# Patient Record
Sex: FEMALE | Race: WHITE | Employment: OTHER | URBAN - METROPOLITAN AREA
[De-identification: names, ages, dates, MRNs, and addresses within clinical notes are randomized per-mention and may not be internally consistent; named-entity substitution may affect disease eponyms.]

---

## 2017-02-07 ENCOUNTER — OFFICE VISIT (OUTPATIENT)
Dept: FAMILY MEDICINE CLINIC | Age: 72
End: 2017-02-07

## 2017-02-07 VITALS
TEMPERATURE: 98 F | HEIGHT: 65 IN | DIASTOLIC BLOOD PRESSURE: 85 MMHG | BODY MASS INDEX: 23.49 KG/M2 | WEIGHT: 141 LBS | OXYGEN SATURATION: 98 % | HEART RATE: 52 BPM | SYSTOLIC BLOOD PRESSURE: 144 MMHG | RESPIRATION RATE: 18 BRPM

## 2017-02-07 DIAGNOSIS — I10 ESSENTIAL HYPERTENSION: ICD-10-CM

## 2017-02-07 DIAGNOSIS — K21.9 GASTROESOPHAGEAL REFLUX DISEASE WITHOUT ESOPHAGITIS: ICD-10-CM

## 2017-02-07 DIAGNOSIS — Z00.00 ROUTINE GENERAL MEDICAL EXAMINATION AT A HEALTH CARE FACILITY: Primary | ICD-10-CM

## 2017-02-07 DIAGNOSIS — I25.810 CORONARY ARTERY DISEASE INVOLVING AUTOLOGOUS VEIN CORONARY BYPASS GRAFT WITHOUT ANGINA PECTORIS: ICD-10-CM

## 2017-02-07 DIAGNOSIS — Z13.39 SCREENING FOR ALCOHOLISM: ICD-10-CM

## 2017-02-07 DIAGNOSIS — F32.9 REACTIVE DEPRESSION: ICD-10-CM

## 2017-02-07 DIAGNOSIS — Z23 ENCOUNTER FOR IMMUNIZATION: ICD-10-CM

## 2017-02-07 DIAGNOSIS — Z95.1 S/P CABG X 1: ICD-10-CM

## 2017-02-07 NOTE — PATIENT INSTRUCTIONS
Medicare Part B Preventive Services Limitations Recommendation Scheduled   Bone Mass Measurement  (age 72 & older, biennial) Requires diagnosis related to osteoporosis or estrogen deficiency. Biennial benefit unless patient has history of long-term glucocorticoid tx or baseline is needed because initial test was by other method     Cardiovascular Screening Blood Tests (every 5 years)  Total cholesterol, HDL, Triglycerides Order as a panel if possible     Colorectal Cancer Screening  -Fecal occult blood test (annual)  -Flexible sigmoidoscopy (5y)  -Screening colonoscopy (10y)  -Barium Enema      Counseling to Prevent Tobacco Use (up to 8 sessions per year)  - Counseling greater than 3 and up to 10 minutes  - Counseling greater than 10 minutes Patients must be asymptomatic of tobacco-related conditions to receive as preventive service     Diabetes Screening Tests (at least every 3 years, Medicare covers annually or at 6-month intervals for prediabetic patients)    Fasting blood sugar (FBS) or glucose tolerance test (GTT) Patient must be diagnosed with one of the following:  -Hypertension, Dyslipidemia, obesity, previous impaired FBS or GTT  Or any two of the following: overweight, FH of diabetes, age ? 72, history of gestational diabetes, birth of baby weighing more than 9 pounds     Diabetes Self-Management Training (DSMT) (no USPSTF recommendation) Requires referral by treating physician for patient with diabetes or renal disease. 10 hours of initial DSMT session of no less than 30 minutes each in a continuous 12-month period. 2 hours of follow-up DSMT in subsequent years.      Glaucoma Screening (no USPSTF recommendation) Diabetes mellitus, family history, , age 48 or over,  American, age 72 or over     Human Immunodeficiency Virus (HIV) Screening (annually for increased risk patients)  HIV-1 and HIV-2 by EIA, RAYMOND, rapid antibody test, or oral mucosa transudate Patient must be at increased risk for HIV infection per USPSTF guidelines or pregnant. Tests covered annually for patients at increased risk. Pregnant patients may receive up to 3 test during pregnancy. Medical Nutrition Therapy (MNT) (for diabetes or renal disease not recommended schedule) Requires referral by treating physician for patient with diabetes or renal disease. Can be provided in same year as diabetes self-management training (DSMT), and CMS recommends medical nutrition therapy take place after DSMT. Up to 3 hours for initial year and 2 hours in subsequent years. Shingles Vaccination A shingles vaccine is also recommended once in a lifetime after age 61     Seasonal Influenza Vaccination (annually)      Pneumococcal Vaccination (once after 72)      Hepatitis B Vaccinations (if medium/high risk) Medium/high risk factors:  End-stage renal disease,  Hemophiliacs who received Factor VIII or IX concentrates, Clients of institutions for the mentally retarded, Persons who live in the same house as a HepB virus carrier, Homosexual men, Illicit injectable drug abusers. Screening Mammography (biennial age 54-69) Annually (age 36 or over)     Screening Pap Tests and Pelvic Examination (up to age 79 and after 79 if unknown history or abnormal study last 10 years) Every 25 months except high risk     Ultrasound Screening for Abdominal Aortic Aneurysm (AAA) (once) Patient must be referred through [facility] and not have had a screening for abdominal aortic aneurysm before under Medicare. Limited to patients who meet one of the following criteria:  - Men who are 73-68 years old and have smoked more than 100 cigarettes in their lifetime.  -Anyone with a FH of AAA  -Anyone recommended for screening by USPSTF         Vaccine Information Statement     Tdap (Tetanus, Diphtheria, Pertussis) Vaccine: What You Need to Know    Many Vaccine Information Statements are available in Italian and other languages.  See www.immunize.org/vis. Hojas de Información Sobre Vacunas están disponibles en español y en muchos otros idiomas. Visite [email or web address]    1. Why get vaccinated? Tetanus, diphtheria, and pertussis are very serious diseases. Tdap vaccine can protect us from these diseases. And, Tdap vaccine given to pregnant women can protect  babies against pertussis. TETANUS (Lockjaw) is rare in the [facility] today. It causes painful muscle tightening and stiffness, usually all over the body.  It can lead to tightening of muscles in the head and neck so you cant open your mouth, swallow, or sometimes even breathe. Tetanus kills about 1 out of 10 people who are infected even after receiving the best medical care. DIPHTHERIA is also rare in the [facility] today. It can cause a thick coating to form in the back of the throat.  It can lead to breathing problems, heart failure, paralysis, and death. PERTUSSIS (Whooping Cough) causes severe coughing spells, which can cause difficulty breathing, vomiting, and disturbed sleep.  It can also lead to weight loss, incontinence, and rib fractures. Up to 2 in 100 adolescents and 5 in 100 adults with pertussis are hospitalized or have complications, which could include pneumonia or death. These diseases are caused by bacteria. Diphtheria and pertussis are spread from person to person through secretions from coughing or sneezing. Tetanus enters the body through cuts, scratches, or wounds. Before vaccines, as many as 200,000 cases of diphtheria, 200,000 cases of pertussis, and hundreds of cases of tetanus, were reported in the [place] each year. Since vaccination began, reports of cases for tetanus and diphtheria have dropped by about 99% and for pertussis by about 80%. 2. Tdap vaccine    Tdap vaccine can protect adolescents and adults from tetanus, diphtheria, and pertussis. One dose of Tdap is routinely given at age 6 or 15. People who did not get Tdap at that age should get it as soon as possible. Tdap is especially important for health care professionals and anyone having close contact with a baby younger than 12 months. Pregnant women should get a dose of Tdap during every pregnancy, to protect the  from pertussis. Infants are most at risk for severe, life-threatening complications from pertussis. Another vaccine, called Td, protects against tetanus and diphtheria, but not pertussis. A Td booster should be given every 10 years. Tdap may be given as one of these boosters if you have never gotten Tdap before. Tdap may also be given after a severe cut or burn to prevent tetanus infection. Your doctor or the person giving you the vaccine can give you more information. Tdap may safely be given at the same time as other vaccines. 3. Some people should not get this vaccine     A person who has ever had a life-threatening allergic reaction after a previous dose of any diphtheria, tetanus or pertussis containing vaccine, OR has a severe allergy to any part of this vaccine, should not get Tdap vaccine. Tell the person giving the vaccine about any severe allergies.  Anyone who had coma or long repeated seizures within 7 days after a childhood dose of DTP or DTaP, or a previous dose of Tdap, should not get Tdap, unless a cause other than the vaccine was found. They can still get Td.  Talk to your doctor if you:  - have seizures or another nervous system problem,  - had severe pain or swelling after any vaccine containing diphtheria, tetanus or pertussis,   - ever had a condition called Guillain Barré Syndrome (GBS),  - arent feeling well on the day the shot is scheduled. 4. Risks    With any medicine, including vaccines, there is a chance of side effects. These are usually mild and go away on their own. Serious reactions are also possible but are rare.      Most people who get Tdap vaccine do not have any problems with it. Mild Problems following Tdap  (Did not interfere with activities)   Pain where the shot was given (about 3 in 4 adolescents or 2 in 3 adults)   Redness or swelling where the shot was given (about 1 person in 5)   Mild fever of at least 100.4°F (up to about 1 in 25 adolescents or 1 in 100 adults)   Headache (about 3 or 4 people in 10)   Tiredness (about 1 person in 3 or 4)   Nausea, vomiting, diarrhea, stomach ache (up to 1 in 4 adolescents or 1 in 10 adults)   Chills,  sore joints (about 1 person in 10)   Body aches (about 1 person in 3 or 4)    Rash, swollen glands (uncommon)    Moderate Problems following Tdap  (Interfered with activities, but did not require medical attention)   Pain where the shot was given (up to 1 in 5 or 6)    Redness or swelling where the shot was given (up to about 1 in 16 adolescents or 1 in 12 adults)   Fever over 102°F (about 1 in 100 adolescents or 1 in 250 adults)   Headache (about 1 in 7 adolescents or 1 in 10 adults)   Nausea, vomiting, diarrhea, stomach ache (up to 1 or 3 people in 100)   Swelling of the entire arm where the shot was given (up to about 1 in 500). Severe Problems following Tdap  (Unable to perform usual activities; required medical attention)   Swelling, severe pain, bleeding, and redness in the arm where the shot was given (rare). Problems that could happen after any vaccine:     People sometimes faint after a medical procedure, including vaccination. Sitting or lying down for about 15 minutes can help prevent fainting, and injuries caused by a fall. Tell your doctor if you feel dizzy, or have vision changes or ringing in the ears.  Some people get severe pain in the shoulder and have difficulty moving the arm where a shot was given. This happens very rarely.  Any medication can cause a severe allergic reaction.  Such reactions from a vaccine are very rare, estimated at fewer than 1 in a million doses, and would happen within a few minutes to a few hours after the vaccination. As with any medicine, there is a very remote chance of a vaccine causing a serious injury or death. The safety of vaccines is always being monitored. For more information, visit: www.cdc.gov/vaccinesafety/    5. What if there is a serious problem? What should I look for?  Look for anything that concerns you, such as signs of a severe allergic reaction, very high fever, or unusual behavior.  Signs of a severe allergic reaction can include hives, swelling of the face and throat, difficulty breathing, a fast heartbeat, dizziness, and weakness. These would usually start a few minutes to a few hours after the vaccination. What should I do?  If you think it is a severe allergic reaction or other emergency that cant wait, call 9-1-1 or get the person to the nearest hospital. Otherwise, call your doctor.  Afterward, the reaction should be reported to the Vaccine Adverse Event Reporting System (VAERS). Your doctor might file this report, or you can do it yourself through the VAERS web site at www.vaers. [facility].gov, or by calling 8-168.284.9970. VAERS does not give medical advice. 6. The National Vaccine Injury Compensation Program    The [facility] Vaccine Injury Compensation Program (VICP) is a federal program that was created to compensate people who may have been injured by certain vaccines. Persons who believe they may have been injured by a vaccine can learn about the program and about filing a claim by calling 4-829.425.2948 or visiting the [employer] [address] [employer] website at www.[facility].gov/vaccinecompensation. There is a time limit to file a claim for compensation. 7. How can I learn more?  Ask your doctor. He or she can give you the vaccine package insert or suggest other sources of information.  Call your local or state health department.    Contact the Centers for Disease Control and Prevention (CDC):  - Call 2-939.314.9071 (8-051-KOR-INFO) or  - Visit CDCs website at www.cdc.gov/vaccines      Vaccine Information Statement   Tdap Vaccine  (2/24/2015)  42 ISAIAS Montoya [id number]    Department of Health and Human Services  Centers for Disease Control and Prevention    Office Use Only

## 2017-02-07 NOTE — MR AVS SNAPSHOT
Visit Information Date & Time Provider Department Dept. Phone Encounter #  
 2/7/2017 10:00 AM Ramesh Nichols MD [address] [facility] [phone] [id number] Follow-up Instructions Return in about 1 year (around 2/7/2018). Upcoming Health Maintenance Date Due Hepatitis C Screening 1945 DTaP/Tdap/Td series (1 - Tdap) 12/11/1968 ZOSTER VACCINE AGE 60> 12/11/2007 OSTEOPOROSIS SCREENING (DEXA) 12/11/2012 GLAUCOMA SCREENING Q2Y 11/21/2015 COLONOSCOPY 5/21/2016 MEDICARE YEARLY EXAM 10/6/2016 BREAST CANCER SCRN MAMMOGRAM 6/16/2017 Allergies as of 2/7/2017  Review Complete On: 2/7/2017 By: Jeanette Wayne Severity Noted Reaction Type Reactions Amoxicillin  12/06/2010    Rash Amoxicillin  11/07/2011   Side Effect Rash  
 Sulfa (Sulfonamide Antibiotics)  01/14/2013   Intolerance Nausea Only Current Immunizations  Reviewed on 10/4/2016 Name Date Influenza High Dose Vaccine PF 10/4/2016, 10/6/2015, 11/11/2014 Influenza Vaccine 10/23/2013 Influenza Vaccine Split 11/16/2012 Influenza Vaccine Whole 10/1/2010 Pneumococcal Polysaccharide (PPSV-23) 5/21/2014 Pneumococcal Vaccine (Unspecified Type) 1/1/2007 Varicella Virus Vaccine Live 6/19/2012 Not reviewed this visit You Were Diagnosed With   
  
 Codes Comments Routine general medical examination at a health care facility    -  Primary ICD-10-CM: Z00.00 ICD-9-CM: V70.0 Screening for alcoholism     ICD-10-CM: Z13.89 ICD-9-CM: V79.1 Encounter for immunization     ICD-10-CM: M42 ICD-9-CM: V03.89 S/P CABG x 1     ICD-10-CM: Z95.1 ICD-9-CM: V45.81 Reactive depression     ICD-10-CM: F32.9 ICD-9-CM: 300.4 Vitals Weight(growth percentile) BMI OB Status Smoking Status 141 lb (64 kg) 23.46 kg/m2 Postmenopausal Never Smoker BMI and BSA Data  Body Mass Index Body Surface Area  
 23.46 kg/m 2 1.71 m 2  
  
  
 Preferred Pharmacy Pharmacy Name Phone [facility] [address] [phone] Your Updated Medication List  
  
   
This list is accurate as of: 17 11:13 AM.  Always use your most recent med list.  
  
  
  
  
 SINDY CHILDRENS ASPIRIN 81 mg chewable tablet Generic drug:  aspirin Take 81 mg by mouth daily. CRANBERRY CONCENTRATE Cap Generic drug:  vitamin c-vitamin e Take 1 Tab by mouth daily. diph,pertuss(acel),tetanus vac(PF) 2 Lf-(2.5-5-3-5 mcg)-5Lf/0.5 mL Syrg vaccine Commonly known as:  ADACEL  
0.5 mL by IntraMUSCular route once for 1 dose. FLUoxetine 10 mg capsule Commonly known as:  PROzac Take 1 Cap by mouth daily. HYDROcodone-acetaminophen 5-325 mg per tablet Commonly known as:  Rolinda Doles Take 1 Tab by mouth every six (6) hours as needed for Pain. Max Daily Amount: 4 Tabs. lisinopril 10 mg tablet Commonly known as:  PRINIVIL, ZESTRIL  
TAKE 1 TABLET BY MOUTH EVERY NIGHT  
  
 metoprolol succinate 50 mg XL tablet Commonly known as:  TOPROL-XL  
TAKE 1 TABLET BY MOUTH EVERY DAY  
  
 omeprazole 20 mg capsule Commonly known as:  PRILOSEC  
TAKE 1 CAPSULE BY MOUTH EVERY DAY PARoxetine 10 mg tablet Commonly known as:  PAXIL Take 1 Tab by mouth daily. pravastatin 80 mg tablet Commonly known as:  PRAVACHOL Take 1 Tab by mouth nightly. traMADol 50 mg tablet Commonly known as:  ULTRAM  
TAKE 1 TABLET BY MOUTH EVERY 6 HOURS AS NEEDED FOR PAIN  
  
 VITAMIN D3 1,000 unit tablet Generic drug:  cholecalciferol Take 1,000 Units by mouth daily. Prescriptions Printed Refills diph,pertuss,acel,,tetanus vac,PF, (ADACEL) 2 Lf-(2.5-5-3-5 mcg)-5Lf/0.5 mL syrg vaccine 0 Si.5 mL by IntraMUSCular route once for 1 dose. Class: Print Route: IntraMUSCular We Performed the Following REFERRAL TO PSYCHOLOGY [NEY10 Custom] Comments: Please evaluate patient for reactive depression. Follow-up Instructions Return in about 1 year (around 2/7/2018). Referral Information Referral ID Referred By Referred To  
  
 [id number] Celestine RENNER Not Available Visits Status Start Date End Date 1 New Request 2/7/17 2/7/18 If your referral has a status of pending review or denied, additional information will be sent to support the outcome of this decision. Patient Instructions Medicare Part B Preventive Services Limitations Recommendation Scheduled Bone Mass Measurement 
(age 72 & older, biennial) Requires diagnosis related to osteoporosis or estrogen deficiency. Biennial benefit unless patient has history of long-term glucocorticoid tx or baseline is needed because initial test was by other method Cardiovascular Screening Blood Tests (every 5 years) Total cholesterol, HDL, Triglycerides Order as a panel if possible Colorectal Cancer Screening 
-Fecal occult blood test (annual) -Flexible sigmoidoscopy (5y) 
-Screening colonoscopy (10y) -Barium Enema Counseling to Prevent Tobacco Use (up to 8 sessions per year) - Counseling greater than 3 and up to 10 minutes - Counseling greater than 10 minutes Patients must be asymptomatic of tobacco-related conditions to receive as preventive service Diabetes Screening Tests (at least every 3 years, Medicare covers annually or at 6-month intervals for prediabetic patients) Fasting blood sugar (FBS) or glucose tolerance test (GTT) Patient must be diagnosed with one of the following: 
-Hypertension, Dyslipidemia, obesity, previous impaired FBS or GTT 
Or any two of the following: overweight, FH of diabetes, age ? 72, history of gestational diabetes, birth of baby weighing more than 9 pounds Diabetes Self-Management Training (DSMT) (no USPSTF recommendation) Requires referral by treating physician for patient with diabetes or renal disease. 10 hours of initial DSMT session of no less than 30 minutes each in a continuous 12-month period. 2 hours of follow-up DSMT in subsequent years. Glaucoma Screening (no USPSTF recommendation) Diabetes mellitus, family history, , age 48 or over,  American, age 72 or over Human Immunodeficiency Virus (HIV) Screening (annually for increased risk patients) HIV-1 and HIV-2 by EIA, RAYMOND, rapid antibody test, or oral mucosa transudate Patient must be at increased risk for HIV infection per USPSTF guidelines or pregnant. Tests covered annually for patients at increased risk. Pregnant patients may receive up to 3 test during pregnancy. Medical Nutrition Therapy (MNT) (for diabetes or renal disease not recommended schedule) Requires referral by treating physician for patient with diabetes or renal disease. Can be provided in same year as diabetes self-management training (DSMT), and CMS recommends medical nutrition therapy take place after DSMT. Up to 3 hours for initial year and 2 hours in subsequent years. Shingles Vaccination A shingles vaccine is also recommended once in a lifetime after age 61 Seasonal Influenza Vaccination (annually) Pneumococcal Vaccination (once after 65) Hepatitis B Vaccinations (if medium/high risk) Medium/high risk factors:  End-stage renal disease, Hemophiliacs who received Factor VIII or IX concentrates, Clients of institutions for the mentally retarded, Persons who live in the same house as a HepB virus carrier, Homosexual men, Illicit injectable drug abusers. Screening Mammography (biennial age 54-69) Annually (age 36 or over) Screening Pap Tests and Pelvic Examination (up to age 79 and after 79 if unknown history or abnormal study last 10 years) Every 24 months except high risk Ultrasound Screening for Abdominal Aortic Aneurysm (AAA) (once) Patient must be referred through [facility] and not have had a screening for abdominal aortic aneurysm before under Medicare. Limited to patients who meet one of the following criteria: 
- Men who are 73-68 years old and have smoked more than 100 cigarettes in their lifetime. 
-Anyone with a FH of AAA 
-Anyone recommended for screening by USPSTF Vaccine Information Statement Tdap (Tetanus, Diphtheria, Pertussis) Vaccine: What You Need to Know Many Vaccine Information Statements are available in [place] and other languages. See www.immunize.org/vis. Hojas de Información Sobre Vacunas están disponibles en español y en muchos otros idiomas. Visite [facility] 1. Why get vaccinated? Tetanus, diphtheria, and pertussis are very serious diseases. Tdap vaccine can protect us from these diseases. And, Tdap vaccine given to pregnant women can protect  babies against pertussis. TETANUS (Lockjaw) is rare in the [facility] today. It causes painful muscle tightening and stiffness, usually all over the body. ? It can lead to tightening of muscles in the head and neck so you cant open your mouth, swallow, or sometimes even breathe. Tetanus kills about 1 out of 10 people who are infected even after receiving the best medical care. DIPHTHERIA is also rare in the [facility] today. It can cause a thick coating to form in the back of the throat. ? It can lead to breathing problems, heart failure, paralysis, and death. PERTUSSIS (Whooping Cough) causes severe coughing spells, which can cause difficulty breathing, vomiting, and disturbed sleep. ? It can also lead to weight loss, incontinence, and rib fractures. Up to 2 in 100 adolescents and 5 in 100 adults with pertussis are hospitalized or have complications, which could include pneumonia or death. These diseases are caused by bacteria.  Diphtheria and pertussis are spread from person to person through secretions from coughing or sneezing. Tetanus enters the body through cuts, scratches, or wounds. Before vaccines, as many as 200,000 cases of diphtheria, 200,000 cases of pertussis, and hundreds of cases of tetanus, were reported in the [place] each year. Since vaccination began, reports of cases for tetanus and diphtheria have dropped by about 99% and for pertussis by about 80%. 2. Tdap vaccine Tdap vaccine can protect adolescents and adults from tetanus, diphtheria, and pertussis. One dose of Tdap is routinely given at age 6 or 15. People who did not get Tdap at that age should get it as soon as possible. Tdap is especially important for health care professionals and anyone having close contact with a baby younger than 12 months. Pregnant women should get a dose of Tdap during every pregnancy, to protect the  from pertussis. Infants are most at risk for severe, life-threatening complications from pertussis. Another vaccine, called Td, protects against tetanus and diphtheria, but not pertussis. A Td booster should be given every 10 years. Tdap may be given as one of these boosters if you have never gotten Tdap before. Tdap may also be given after a severe cut or burn to prevent tetanus infection. Your doctor or the person giving you the vaccine can give you more information. Tdap may safely be given at the same time as other vaccines. 3. Some people should not get this vaccine  A person who has ever had a life-threatening allergic reaction after a previous dose of any diphtheria, tetanus or pertussis containing vaccine, OR has a severe allergy to any part of this vaccine, should not get Tdap vaccine. Tell the person giving the vaccine about any severe allergies.  
 
 Anyone who had coma or long repeated seizures within 7 days after a childhood dose of DTP or DTaP, or a previous dose of Tdap, should not get Tdap, unless a cause other than the vaccine was found. They can still get Td.  Talk to your doctor if you: 
- have seizures or another nervous system problem, 
- had severe pain or swelling after any vaccine containing diphtheria, tetanus or pertussis,  
- ever had a condition called Guillain Barré Syndrome (GBS), 
- arent feeling well on the day the shot is scheduled. 4. Risks With any medicine, including vaccines, there is a chance of side effects. These are usually mild and go away on their own. Serious reactions are also possible but are rare. Most people who get Tdap vaccine do not have any problems with it. Mild Problems following Tdap 
(Did not interfere with activities)  Pain where the shot was given (about 3 in 4 adolescents or 2 in 3 adults)  Redness or swelling where the shot was given (about 1 person in 5)  Mild fever of at least 100.4°F (up to about 1 in 25 adolescents or 1 in 100 adults)  Headache (about 3 or 4 people in 10)  Tiredness (about 1 person in 3 or 4)  Nausea, vomiting, diarrhea, stomach ache (up to 1 in 4 adolescents or 1 in 10 adults)  Chills,  sore joints (about 1 person in 10)  Body aches (about 1 person in 3 or 4)  Rash, swollen glands (uncommon) Moderate Problems following Tdap (Interfered with activities, but did not require medical attention)  Pain where the shot was given (up to 1 in 5 or 6)  Redness or swelling where the shot was given (up to about 1 in 16 adolescents or 1 in 12 adults)  Fever over 102°F (about 1 in 100 adolescents or 1 in 250 adults)  Headache (about 1 in 7 adolescents or 1 in 10 adults)  Nausea, vomiting, diarrhea, stomach ache (up to 1 or 3 people in 100)  Swelling of the entire arm where the shot was given (up to about 1 in 500). Severe Problems following Tdap 
(Unable to perform usual activities; required medical attention)  Swelling, severe pain, bleeding, and redness in the arm where the shot was given (rare). Problems that could happen after any vaccine:  People sometimes faint after a medical procedure, including vaccination. Sitting or lying down for about 15 minutes can help prevent fainting, and injuries caused by a fall. Tell your doctor if you feel dizzy, or have vision changes or ringing in the ears.  Some people get severe pain in the shoulder and have difficulty moving the arm where a shot was given. This happens very rarely.  Any medication can cause a severe allergic reaction. Such reactions from a vaccine are very rare, estimated at fewer than 1 in a million doses, and would happen within a few minutes to a few hours after the vaccination. As with any medicine, there is a very remote chance of a vaccine causing a serious injury or death. The safety of vaccines is always being monitored. For more information, visit: www.cdc.gov/vaccinesafety/ 
 
5. What if there is a serious problem? What should I look for?  Look for anything that concerns you, such as signs of a severe allergic reaction, very high fever, or unusual behavior.  Signs of a severe allergic reaction can include hives, swelling of the face and throat, difficulty breathing, a fast heartbeat, dizziness, and weakness. These would usually start a few minutes to a few hours after the vaccination. What should I do?  If you think it is a severe allergic reaction or other emergency that cant wait, call 9-1-1 or get the person to the nearest hospital. Otherwise, call your doctor.  Afterward, the reaction should be reported to the Vaccine Adverse Event Reporting System (VAERS). Your doctor might file this report, or you can do it yourself through the VAERS web site at www.vaers. hhs.gov, or by calling 4-611.300.7762. VAERS does not give medical advice.  
 
6. The National Vaccine Injury Compensation Program 
 
The [facility] Vaccine Injury Compensation Program (VICP) is a federal program that was created to compensate people who may have been injured by certain vaccines. Persons who believe they may have been injured by a vaccine can learn about the program and about filing a claim by calling 1-807.704.3626 or visiting the [place] website at [email or web address]/vaccinecompensation. There is a time limit to file a claim for compensation. 7. How can I learn more?  Ask your doctor. He or she can give you the vaccine package insert or suggest other sources of information.  Call your local or state health department.  Contact the Centers for Disease Control and Prevention (CDC): 
- Call 9-195.956.1650 (9-412-MEL-INFO) or 
- Visit CDCs website at www.cdc.gov/vaccines Vaccine Information Statement Tdap Vaccine 
(2/24/2015) 42 CHARITYMaryjane Dutta 733AR-09 [facility] and [employer] Centers for Disease Control and Prevention Office Use Only Introducing [facility]! [employer] introduces [employer] patient portal. Now you can access parts of your medical record, email your doctor's office, and request medication refills online. 1. In your internet browser, go to [email or web address] 2. Click on the First Time User? Click Here link in the Sign In box. You will see the New Member Sign Up page. 3. Enter your [employer] Access Code exactly as it appears below. You will not need to use this code after youve completed the sign-up process. If you do not sign up before the expiration date, you must request a new code. · [employer] Access Code: [email or web address] Expires: 5/8/2017  9:51 AM 
 
4. Enter the last four digits of your Social Security Number (xxxx) and Date of Birth (mm/dd/yyyy) as indicated and click Submit. You will be taken to the next sign-up page. 5. Create a [employer]SI[employer] ID. This will be your [employer] login ID and cannot be changed, so think of one that is secure and easy to remember. 6. Create a [employer] password. You can change your password at any time. 7. Enter your Password Reset Question and Answer. This can be used at a later time if you forget your password. 8. Enter your e-mail address. You will receive e-mail notification when new information is available in [address] 9. Click Sign Up. You can now view and download portions of your medical record. 10. Click the Download Summary menu link to download a portable copy of your medical information. If you have questions, please visit the Frequently Asked Questions section of the [employer] website. Remember, [employer] is NOT to be used for urgent needs. For medical emergencies, dial 911. Now available from your iPhone and Android! Please provide this summary of care documentation to your next provider. Your primary care clinician is listed as Remberto Fisher. If you have any questions after today's visit, please call [phone].

## 2017-02-07 NOTE — PROGRESS NOTES
NICOLE White is a 73 y.o. female who presents to discuss her chronic medical conditions and her mood. She has felt rushed off her feet for the last several months. She is taking care of several family members and this burn seems to be wearing on her. Every day she feels stressed. Has good days and bad days. 50-50 which they will be a good day. When she is having bad days she feels the urge to get out of the house and go shopping. He is sitting with a sister-in-law who has breast cancer and is very depressed. Patient believes those interactions feeling very depressed. She has another sister who lives next door who has anxiety. Patient feels very anxious after interacting with the sister. She lost her  3 years ago and does not think that she is quite over it. Gets lonely in the house. Try taking Paxil 10 mg and Prozac 10 mg at one point. Only took them for about 2 weeks each and did not notice much benefit. She is interested in reducing her total medicines    PMHx:  Past Medical History   Diagnosis Date    Back pain     CAD (coronary artery disease)      CABG, [place]    Hypercholesterolemia     Hypertension        Meds:   Current Outpatient Prescriptions   Medication Sig Dispense Refill    diph,pertuss,acel,,tetanus vac,PF, (ADACEL) 2 Lf-(2.5-5-3-5 mcg)-5Lf/0.5 mL syrg vaccine 0.5 mL by IntraMUSCular route once for 1 dose. 0.5 mL 0    pravastatin (PRAVACHOL) 80 mg tablet Take 1 Tab by mouth nightly. 90 Tab 3    omeprazole (PRILOSEC) 20 mg capsule TAKE 1 CAPSULE BY MOUTH EVERY DAY 90 Cap 3    metoprolol succinate (TOPROL-XL) 50 mg XL tablet TAKE 1 TABLET BY MOUTH EVERY DAY 90 Tab 2    lisinopril (PRINIVIL, ZESTRIL) 10 mg tablet TAKE 1 TABLET BY MOUTH EVERY NIGHT 90 Tab 3    vitamin c-vitamin e (CRANBERRY CONCENTRATE) cap Take 1 Tab by mouth daily.  aspirin (SINDY CHILDRENS ASPIRIN) 81 mg chewable tablet Take 81 mg by mouth daily.       cholecalciferol, vitamin d3, (VITAMIN D) 1,000 unit tablet Take 1,000 Units by mouth daily.  PARoxetine (PAXIL) 10 mg tablet Take 1 Tab by mouth daily. 30 Tab 3    FLUoxetine (PROZAC) 10 mg capsule Take 1 Cap by mouth daily. 30 Cap 2    traMADol (ULTRAM) 50 mg tablet TAKE 1 TABLET BY MOUTH EVERY 6 HOURS AS NEEDED FOR PAIN 30 Tab 0    HYDROcodone-acetaminophen (NORCO) 5-325 mg per tablet Take 1 Tab by mouth every six (6) hours as needed for Pain. Max Daily Amount: 4 Tabs. 30 Tab 0       Allergies: Allergies   Allergen Reactions    Amoxicillin Rash    Amoxicillin Rash    Sulfa (Sulfonamide Antibiotics) Nausea Only       Smoker:  History   Smoking Status    Never Smoker   Smokeless Tobacco    Never Used       ETOH:   History   Alcohol Use    Yes     Comment: occ       FH:   Family History   Problem Relation Age of Onset    Heart Attack Mother     Heart Disease Mother     Heart Disease Father     Heart Disease Sister     Emphysema Sister        ROS:  As listed in HPI. In addition:  Constitutional:   No headache, fever, fatigue, weight loss or weight gain    Cardiac:    No chest pain      Resp:   No cough or shortness of breath      Neuro   No loss of consciousness, dizziness, seizures      Physical Exam:  Blood pressure 144/85, pulse (!) 52, temperature 98 °F (36.7 °C), resp. rate 18, height 5' 5\" (1.651 m), weight 141 lb (64 kg), SpO2 98 %. GEN: No apparent distress. Alert and oriented and responds to all questions appropriately. LUNGS: Respirations unlabored; clear to auscultation bilaterally  CARDIOVASCULAR: Regular rate and rhythm without murmurs   ABDOMEN: Soft; nontender; nondistended; normoactive bowel sounds; no masses or organomegaly  NEUROLOGIC:  No focal neurologic deficits. Strength and sensation grossly intact. Coordination and gait grossly intact. EXT: Well perfused. No edema. SKIN: No obvious rashes.        Assessment and Plan     See the attached Medicare wellness visit    Reactive depression  Using this diagnosis for lack of a better term. She has been going through a rough time since her 's death 3 years ago. A lot of this seems to be a result of caring for family members. She puts a lot of effort into caring for her ailing family. The emotions she described to me are counter transference from their mental health issues. Exhibited remarkable insight into this. I am not getting the impression that she is depressed. She is capable of having good days. This sounds like life stress. I recommend that she engage a therapist to help her identify this better and build some defense mechanisms. Explained this to patient. Seemed understand. She seemed reassured that she did not need medicine at this point. We also reviewed medications that she is taking, what the role is and whether or not she could stop them:    GERD  Taking omeprazole daily. No proven structural defect. Would like her to stop this using the Zantac bridge that I wrote out for her. CABG  Taking pravastatin 80 mg. This is the right dose given her heart history  Metoprolol  Aspirin a day    Hypertension  Lisinopril 10 mg      ICD-10-CM ICD-9-CM    1. Routine general medical examination at a health care facility Z00.00 V70.0    2. Screening for alcoholism Z13.89 V79.1    3. Encounter for immunization Z23 V03.89 diph,pertuss,acel,,tetanus vac,PF, (ADACEL) 2 Lf-(2.5-5-3-5 mcg)-5Lf/0.5 mL syrg vaccine   4. S/P CABG x 1 Z95.1 V45.81    5. Reactive depression F32.9 300.4 REFERRAL TO PSYCHOLOGY   6. Gastroesophageal reflux disease without esophagitis K21.9 530.81    7. Essential hypertension I10 401.9    8. Coronary artery disease involving autologous vein coronary bypass graft without angina pectoris I25.810 414.02        AVS given.  Pt expressed understanding of instructions

## 2017-02-07 NOTE — PROGRESS NOTES
.  Chief Complaint   Patient presents with   [facility] Annual Wellness Visit    Anxiety     . Leane Lesch

## 2017-03-10 RX ORDER — METOPROLOL SUCCINATE 50 MG/1
TABLET, EXTENDED RELEASE ORAL
Qty: 90 TAB | Refills: 3 | Status: SHIPPED | OUTPATIENT
Start: 2017-03-10 | End: 2018-03-08 | Stop reason: SDUPTHER

## 2017-04-11 ENCOUNTER — TELEPHONE (OUTPATIENT)
Dept: FAMILY MEDICINE CLINIC | Age: 72
End: 2017-04-11

## 2017-04-11 NOTE — TELEPHONE ENCOUNTER
Spoke with patient and at this time she can afford her script which would run her approximately 57.29 for 90 day supply.

## 2017-04-26 RX ORDER — LISINOPRIL 10 MG/1
10 TABLET ORAL DAILY
Qty: 90 TAB | Refills: 3 | Status: SHIPPED | OUTPATIENT
Start: 2017-04-26 | End: 2018-04-25 | Stop reason: SDUPTHER

## 2017-06-30 ENCOUNTER — OFFICE VISIT (OUTPATIENT)
Dept: FAMILY MEDICINE CLINIC | Age: 72
End: 2017-06-30

## 2017-06-30 VITALS
RESPIRATION RATE: 12 BRPM | WEIGHT: 133 LBS | OXYGEN SATURATION: 16 % | HEART RATE: 58 BPM | DIASTOLIC BLOOD PRESSURE: 71 MMHG | HEIGHT: 65 IN | SYSTOLIC BLOOD PRESSURE: 125 MMHG | TEMPERATURE: 98 F | BODY MASS INDEX: 22.16 KG/M2

## 2017-06-30 DIAGNOSIS — M79.18 MYOFASCIAL PAIN: Primary | ICD-10-CM

## 2017-06-30 NOTE — PROGRESS NOTES
Taco Gonzalez is a 73 y.o. female who has had pain on the right side of her neck for the last week. This resolved and she woke up this morning with pain in her left shoulder and a little pain going down her left arm. She first noticed the pain while she was lying in bed before she got up. Once she got up and got moving the pain subsided. Evidently she was worried that this could be her heart. She has been taking on a lot more stress than usual recently. See my last note, she is caring for a number of her family members and is taking a lot of stress on her own shoulders. Has not established with a therapist yet. PMHx:  Past Medical History:   Diagnosis Date    Back pain     CAD (coronary artery disease)     CABG, [place]    Hypercholesterolemia     Hypertension        Meds:   Current Outpatient Prescriptions   Medication Sig Dispense Refill    B.infantis-B.ani-B.long-B.bifi (PROBIOTIC 4X) 10-15 mg TbEC Take  by mouth.  lisinopril (PRINIVIL, ZESTRIL) 10 mg tablet Take 1 Tab by mouth daily. 90 Tab 3    metoprolol succinate (TOPROL-XL) 50 mg XL tablet TAKE 1 TABLET BY MOUTH EVERY DAY 90 Tab 3    pravastatin (PRAVACHOL) 80 mg tablet Take 1 Tab by mouth nightly. 90 Tab 3    vitamin c-vitamin e (CRANBERRY CONCENTRATE) cap Take 1 Tab by mouth daily.  aspirin (SINDY CHILDRENS ASPIRIN) 81 mg chewable tablet Take 81 mg by mouth daily.  cholecalciferol, vitamin d3, (VITAMIN D) 1,000 unit tablet Take 1,000 Units by mouth daily.  omeprazole (PRILOSEC) 20 mg capsule TAKE 1 CAPSULE BY MOUTH EVERY DAY 90 Cap 3       Allergies:    Allergies   Allergen Reactions    Amoxicillin Rash    Amoxicillin Rash    Sulfa (Sulfonamide Antibiotics) Nausea Only       Smoker:  History   Smoking Status    Never Smoker   Smokeless Tobacco    Never Used       ETOH:   History   Alcohol Use    Yes     Comment: occ       FH:   Family History   Problem Relation Age of Onset    Heart Attack Mother     Heart Disease Mother     Heart Disease Father     Heart Disease Sister     Emphysema Sister        ROS:   As listed in HPI. In addition:  Constitutional:   No headache, fever, fatigue, weight loss or weight gain      Cardiac:    No chest pain      Resp:   No cough or shortness of breath      Neuro   No loss of consciousness, dizziness, seizures      Physical Exam:  Blood pressure 125/71, pulse (!) 58, temperature 98 °F (36.7 °C), resp. rate 12, height 5' 5\" (1.651 m), weight 133 lb (60.3 kg), SpO2 (!) 16 %. GEN: No apparent distress. Alert and oriented and responds to all questions appropriately. NEUROLOGIC:  No focal neurologic deficits. Strength and sensation grossly intact. Coordination and gait grossly intact. EXT: Well perfused. No edema. SKIN: No obvious rashes. Right scalene muscle is tight but not exquisitely tender to palpation. There are superior lateral trapezius trigger points bilaterally. Medial trapezius is also very tight. There are paraspinal muscle spasms in the lumbar area that are exquisitely tender to palpation       Assessment and Plan     Myofascial pain,  Superior lateral trapezius trigger point is causing the arm symptoms on the left, right-sided scalene muscle is tender and was causing her neck pain for the last week. Is also experiencing a little bit of low back pain with the erector spinae a muscle spasm. Went over stretches and exercises that she can do. Try NSAIDs and heating pad for this. If still present in a week return for trigger point injection. She was concerned about her heart. Not unreasonable given the left arm pain but reassuring that the pain got better with activity. No other signs. Try to establish [place] a therapist.  She [place] a lead on somebody [place] [address]    1. Myofascial pain M79.1 729.1        AVS given.  Pt expressed understanding of instructions

## 2017-08-08 ENCOUNTER — HOSPITAL ENCOUNTER (OUTPATIENT)
Dept: LAB | Age: 72
Discharge: HOME | End: 2017-08-08
Payer: MEDICARE

## 2017-08-08 ENCOUNTER — OFFICE VISIT (OUTPATIENT)
Dept: FAMILY MEDICINE CLINIC | Age: 72
End: 2017-08-08

## 2017-08-08 VITALS
TEMPERATURE: 98 F | HEART RATE: 60 BPM | WEIGHT: 134 LBS | SYSTOLIC BLOOD PRESSURE: 131 MMHG | BODY MASS INDEX: 22.33 KG/M2 | DIASTOLIC BLOOD PRESSURE: 67 MMHG | OXYGEN SATURATION: 97 % | HEIGHT: 65 IN | RESPIRATION RATE: 16 BRPM

## 2017-08-08 DIAGNOSIS — Z11.59 ENCOUNTER FOR HEPATITIS C SCREENING TEST FOR LOW RISK PATIENT: ICD-10-CM

## 2017-08-08 DIAGNOSIS — I10 ESSENTIAL HYPERTENSION: Primary | ICD-10-CM

## 2017-08-08 DIAGNOSIS — R35.0 URINE FREQUENCY: ICD-10-CM

## 2017-08-08 DIAGNOSIS — I25.810 CORONARY ARTERY DISEASE INVOLVING AUTOLOGOUS VEIN CORONARY BYPASS GRAFT WITHOUT ANGINA PECTORIS: ICD-10-CM

## 2017-08-08 DIAGNOSIS — E78.5 HYPERLIPIDEMIA, UNSPECIFIED HYPERLIPIDEMIA TYPE: ICD-10-CM

## 2017-08-08 DIAGNOSIS — R53.83 FATIGUE, UNSPECIFIED TYPE: ICD-10-CM

## 2017-08-08 LAB
BILIRUB UR QL STRIP: NEGATIVE
GLUCOSE UR-MCNC: NEGATIVE MG/DL
KETONES P FAST UR STRIP-MCNC: NEGATIVE MG/DL
PH UR STRIP: 6.5 [PH] (ref 4.6–8)
PROT UR QL STRIP: NEGATIVE MG/DL
SP GR UR STRIP: 1.01 (ref 1–1.03)
UA UROBILINOGEN AMB POC: NORMAL (ref 0.2–1)
URINALYSIS CLARITY POC: CLEAR
URINALYSIS COLOR POC: YELLOW
URINE BLOOD POC: NEGATIVE
URINE LEUKOCYTES POC: NORMAL
URINE NITRITES POC: NEGATIVE

## 2017-08-08 PROCEDURE — 36415 COLL VENOUS BLD VENIPUNCTURE: CPT

## 2017-08-08 PROCEDURE — 83036 HEMOGLOBIN GLYCOSYLATED A1C: CPT

## 2017-08-08 PROCEDURE — 87086 URINE CULTURE/COLONY COUNT: CPT

## 2017-08-08 PROCEDURE — 84443 ASSAY THYROID STIM HORMONE: CPT

## 2017-08-08 PROCEDURE — 86803 HEPATITIS C AB TEST: CPT

## 2017-08-08 PROCEDURE — 80053 COMPREHEN METABOLIC PANEL: CPT

## 2017-08-08 PROCEDURE — 85025 COMPLETE CBC W/AUTO DIFF WBC: CPT

## 2017-08-08 RX ORDER — CIPROFLOXACIN 500 MG/1
500 TABLET ORAL 2 TIMES DAILY
Qty: 10 TAB | Refills: 0 | Status: SHIPPED | OUTPATIENT
Start: 2017-08-08 | End: 2017-08-13

## 2017-08-08 NOTE — MR AVS SNAPSHOT
Visit Information Date & Time Provider Department Dept. Phone Encounter #  
 8/8/2017  2:15 PM [phone] [id number] Upcoming Health Maintenance Date Due Hepatitis C Screening 1945 DTaP/Tdap/Td series (1 - Tdap) 12/11/1968 ZOSTER VACCINE AGE 60> 10/11/2007 OSTEOPOROSIS SCREENING (DEXA) 12/11/2012 GLAUCOMA SCREENING Q2Y 11/21/2015 COLONOSCOPY 5/21/2016 BREAST CANCER SCRN MAMMOGRAM 6/16/2017 INFLUENZA AGE 9 TO ADULT 8/1/2017 MEDICARE YEARLY EXAM 2/8/2018 Allergies as of 8/8/2017  Review Complete On: 8/8/2017 By: Roni Hitchcock MD  
  
 Severity Noted Reaction Type Reactions Amoxicillin  12/06/2010    Rash Amoxicillin  11/07/2011   Side Effect Rash  
 Sulfa (Sulfonamide Antibiotics)  01/14/2013   Intolerance Nausea Only Current Immunizations  Reviewed on 10/4/2016 Name Date Influenza High Dose Vaccine PF 10/4/2016, 10/6/2015, 11/11/2014 Influenza Vaccine 10/23/2013 Influenza Vaccine Split 11/16/2012 Influenza Vaccine Whole 10/1/2010 Pneumococcal Polysaccharide (PPSV-23) 5/21/2014 Varicella Virus Vaccine Live 6/19/2012 ZZZ-RETIRED (DO NOT USE) Pneumococcal Vaccine (Unspecified Type) 1/1/2007 Not reviewed this visit You Were Diagnosed With   
  
 Codes Comments Essential hypertension    -  Primary ICD-10-CM: I10 
ICD-9-CM: 401.9 Coronary artery disease involving autologous vein coronary bypass graft without angina pectoris     ICD-10-CM: I25.810 ICD-9-CM: 414.02 Hyperlipidemia, unspecified hyperlipidemia type     ICD-10-CM: E78.5 ICD-9-CM: 272.4 Encounter for hepatitis C screening test for low risk patient     ICD-10-CM: Z11.59 
ICD-9-CM: V73.89 Urine frequency     ICD-10-CM: R35.0 ICD-9-CM: 788.41 Fatigue, unspecified type     ICD-10-CM: R53.83 ICD-9-CM: 780.79 Vitals BP Pulse Temp Resp Height(growth percentile) Weight(growth percentile) 131/67 (BP 1 Location: Left arm, BP Patient Position: Sitting) 60 98 °F (36.7 °C) (Oral) 16 5' 5\" (1.651 m) 134 lb (60.8 kg) SpO2 BMI OB Status Smoking Status 97% 22.3 kg/m2 Postmenopausal Never Smoker Vitals History BMI and BSA Data Body Mass Index Body Surface Area  
 22.3 kg/m 2 1.67 m 2 [facility]erred [facility] [address] [place] [address] [phone] Your Updated Medication List  
  
   
This list is accurate as of: 8/8/17  3:06 PM.  Always use your most recent med list.  
  
  
  
  
 SINDY CHILDRENS ASPIRIN 81 mg chewable tablet Generic drug:  aspirin Take 81 mg by mouth daily. ciprofloxacin HCl 500 mg tablet Commonly known as:  CIPRO Take 1 Tab by mouth two (2) times a day for 5 days. CRANBERRY CONCENTRATE Cap Generic drug:  vitamin c-vitamin e Take 1 Tab by mouth daily. lisinopril 10 mg tablet Commonly known as:  Harmony Fess Take 1 Tab by mouth daily. metoprolol succinate 50 mg XL tablet Commonly known as:  TOPROL-XL  
TAKE 1 TABLET BY MOUTH EVERY DAY  
  
 pravastatin 80 mg tablet Commonly known as:  PRAVACHOL Take 1 Tab by mouth nightly. PROBIOTIC 4X 10-15 mg Tbec Generic drug:  B.infantis-B.ani-B.long-B.bifi Take  by mouth. VITAMIN D3 1,000 unit tablet Generic drug:  cholecalciferol Take 1,000 Units by mouth daily. Prescriptions Sent to Pharmacy Refills  
 ciprofloxacin HCl (CIPRO) 500 mg tablet 0 Sig: Take 1 Tab by mouth two (2) times a day for 5 days. Class: Normal  
 [facility] Ph #: [phone] Route: Oral  
  
We Performed the Following AMB POC URINALYSIS DIP STICK AUTO W/O MICRO [70641 CPT(R)] CBC WITH AUTOMATED DIFF [00660 CPT(R)] HEMOGLOBIN A1C WITH EAG [93223 CPT(R)] HEPATITIS C AB [33907 CPT(R)] METABOLIC PANEL, COMPREHENSIVE [76066 CPT(R)] TSH 3RD GENERATION [31866 CPT(R)] Patient Instructions Frequent Urination: Care Instructions Your Care Instructions An urge to urinate frequently but usually passing only small amounts of urine is a common symptom of urinary problems, such as urinary tract infections. The bladder may become inflamed. This can cause the urge to urinate. You may try to urinate more often than usual to try to soothe that urge. Frequent urination also may be caused by sexually transmitted infections (STIs) or kidney stones. Or it may happen when something irritates the tube that carries urine from the bladder to the outside of the body (urethra). It may also be a sign of diabetes. The cause may be hard to find. You may need tests. Follow-up care is a key part of your treatment and safety. Be sure to make and go to all appointments, and call your doctor if you are having problems. It's also a good idea to know your test results and keep a list of the medicines you take. How can you care for yourself at home? · Drink extra water for the next day or two. This will help make the urine less concentrated. (If you have kidney, heart, or liver disease and have to limit fluids, talk with your doctor before you increase the amount of fluids you drink.) · Avoid drinks that are carbonated or have caffeine. They can irritate the bladder. For women: · Urinate right after you have sex. · After you go to the bathroom, wipe from front to back. · Avoid douches, bubble baths, and feminine hygiene sprays. And avoid other feminine hygiene products that have deodorants. When should you call for help? Call your doctor now or seek immediate medical care if: 
· You have new symptoms, such as fever, nausea, or vomiting. · You have new or worse symptoms of a urinary problem. For example: ¨ You have blood or pus in your urine. ¨ You have chills or body aches. ¨ It hurts to urinate. ¨ You have groin or belly pain. ¨ You have pain in your back just below your rib cage (the flank area). Watch closely for changes in your health, and be sure to contact your doctor if you feel thirstier than usual. 
Where can you learn more? Go to http://ivette-nida.info/. Enter 486 3552 in the search box to learn more about \"Frequent Urination: Care Instructions. \" Current as of: May 5, 2017 Content Version: 11.3 © 0917-2295 [employer]. Care instructions adapted under license by [employer] (which disclaims liability or warranty for this information). If you have questions about a medical condition or this instruction, always ask your healthcare professional. [address] any warranty or liability for your use of this information. Introducing [facility]! Mary Ann Pruitt introduces CoPatient patient portal. Now you can access parts of your medical record, email your doctor's office, and request medication refills online. 1. In your internet browser, go to [email or web address] 2. Click on the First Time User? Click Here link in the Sign In box. You will see the New Member Sign Up page. 3. Enter your CoPatient Access Code exactly as it appears below. You will not need to use this code after youve completed the sign-up process. If you do not sign up before the expiration date, you must request a new code. · CoPatient Access Code: [email or web address] Expires: 9/28/2017  9:59 AM 
 
4. Enter the last four digits of your Social Security Number (xxxx) and Date of Birth (mm/dd/yyyy) as indicated and click Submit. You will be taken to the next sign-up page. 5. Create a CoPatient ID. This will be your CoPatient login ID and cannot be changed, so think of one that is secure and easy to remember. 6. Create a PandoDaily password. You can change your password at any time. 7. Enter your Password Reset Question and Answer. This can be used at a later time if you forget your password. 8. Enter your e-mail address. You will receive e-mail notification when new information is available in [address] 9. Click Sign Up. You can now view and download portions of your medical record. 10. Click the Download Summary menu link to download a portable copy of your medical information. If you have questions, please visit the Frequently Asked Questions section of the PandoDaily website. Remember, PandoDaily is NOT to be used for urgent needs. For medical emergencies, dial 911. Now available from your iPhone and Android! Please provide this summary of care documentation to your next provider. Your primary care clinician is listed as Robinson Crum. If you have any questions after today's visit, please call [phone].

## 2017-08-08 NOTE — PROGRESS NOTES
HPI:  73 y.o.  presents for follow up appointment. No hospital, ER or specialist visits since last primary care visit except as noted below. Complains of urinary frequency for the past few days,  and dizziness/.lightheadedness this morning. Took a preservision pill this morning. ORange juice made her feel better. Had already had coffee. Patient Active Problem List    Diagnosis    S/P CABG x 1    Hyperlipidemia - Takes medication at night as directed, no muscle aches. Tries to watch diet. Last panel at goal.     CAD (coronary artery disease) - s/p CABG, sees Dr Anh Francisco yearly.  Hypertension - No home monitoring. Compliant with medication. No shortness of breath, no chest pain, no vision change, no headache, no lower extremity edema.  GERD (gastroesophageal reflux disease) - asymptomatic. Past Medical History:   Diagnosis Date    Back pain     CAD (coronary artery disease)     CABG, [place]    Hypercholesterolemia     Hypertension        Social History   Substance Use Topics    Smoking status: Never Smoker    Smokeless tobacco: Never Used    Alcohol use Yes      Comment: occ       Outpatient Prescriptions Marked as Taking for the 8/8/17 encounter (Office Visit) with Hyun Alejandro MD   Medication Sig Dispense Refill    B.infantis-B.ani-B.long-B.bifi (PROBIOTIC 4X) 10-15 mg TbEC Take  by mouth.  lisinopril (PRINIVIL, ZESTRIL) 10 mg tablet Take 1 Tab by mouth daily. 90 Tab 3    metoprolol succinate (TOPROL-XL) 50 mg XL tablet TAKE 1 TABLET BY MOUTH EVERY DAY 90 Tab 3    pravastatin (PRAVACHOL) 80 mg tablet Take 1 Tab by mouth nightly. 90 Tab 3    vitamin c-vitamin e (CRANBERRY CONCENTRATE) cap Take 1 Tab by mouth daily.  aspirin (SINDY CHILDRENS ASPIRIN) 81 mg chewable tablet Take 81 mg by mouth daily.  cholecalciferol, vitamin d3, (VITAMIN D) 1,000 unit tablet Take 1,000 Units by mouth daily.          Allergies   Allergen Reactions    Amoxicillin Rash    Amoxicillin Rash    Sulfa (Sulfonamide Antibiotics) Nausea Only       ROS:  ROS negative except as per HPI. PE:  Visit Vitals    /67 (BP 1 Location: Left arm, BP Patient Position: Sitting)    Pulse 60    Temp 98 °F (36.7 °C) (Oral)    Resp 16    Ht 5' 5\" (1.651 m)    Wt 134 lb (60.8 kg)    SpO2 97%    BMI 22.3 kg/m2     Gen: alert, oriented, no acute distress  Head: normocephalic, atraumatic  Ears: external auditory canals clear, TMs without erythema or effusion  Eyes: pupils equal round reactive to light, sclera clear, conjunctiva clear  Oral: moist mucus membranes, no oral lesions, no pharyngeal inflammation or exudate  Neck: symmetric normal sized thyroid, no carotid bruits, no jugular vein distention  Resp: no increase work of breathing, lungs clear to ausculation bilaterally, no wheezing, rales or rhonchi  CV: S1, S2 normal.  No murmurs, rubs, or gallops. Abd: soft, mild suprapubic tender, not distended. No hepatosplenomegaly. Normal bowel sounds. Neuro: cranial nerves intact, normal strength and movement in all extremities, reflexes and sensation intact and symmetric. Skin: no lesion or rash  Extremities: no cyanosis or edema    Results for orders placed or performed in visit on 08/08/17   AMB POC URINALYSIS DIP STICK AUTO W/O MICRO   Result Value Ref Range    Color (UA POC) Yellow     Clarity (UA POC) Clear     Glucose (UA POC) Negative Negative    Bilirubin (UA POC) Negative Negative    Ketones (UA POC) Negative Negative    Specific gravity (UA POC) 1.010 1.001 - 1.035    Blood (UA POC) Negative Negative    pH (UA POC) 6.5 4.6 - 8.0    Protein (UA POC) Negative Negative mg/dL    Urobilinogen (UA POC) 0.2 mg/dL 0.2 - 1    Nitrites (UA POC) Negative Negative    Leukocyte esterase (UA POC) Trace Negative       Assessment/Plan:    1. Essential hypertension  At goal on current medications.   - CBC WITH AUTOMATED DIFF  - METABOLIC PANEL, COMPREHENSIVE  - TSH 3RD GENERATION  - HEMOGLOBIN A1C WITH EAG    2. Coronary artery disease involving autologous vein coronary bypass graft without angina pectoris  Asymptomatic on ACE, ASA, Statin and Bblocker. No signs of CHF.  - HEMOGLOBIN A1C WITH EAG    3. Hyperlipidemia, unspecified hyperlipidemia type  At goal on current medications      4. Encounter for hepatitis C screening test for low risk patient  - HEPATITIS C AB    5. Urine frequency  Likely urinary tract infection. Sent for culture. - AMB POC URINALYSIS DIP STICK AUTO W/O MICRO  - HEMOGLOBIN A1C WITH EAG  - CULTURE, URINE    6. Fatigue, unspecified type  Given urinary frequency and fatigue as well as unintentional weight loss, check blood sugar.  - HEMOGLOBIN A1C WITH EAG      Health Maintenance reviewed - updated. Orders Placed This Encounter    HEPATITIS C AB    CBC WITH AUTOMATED DIFF    METABOLIC PANEL, COMPREHENSIVE    LIPID PANEL    TSH 3RD GENERATION    AMB POC URINALYSIS DIP STICK AUTO W/O MICRO       There are no discontinued medications. Current Outpatient Prescriptions   Medication Sig Dispense Refill    B.infantis-B.ani-B.long-B.bifi (PROBIOTIC 4X) 10-15 mg TbEC Take  by mouth.  lisinopril (PRINIVIL, ZESTRIL) 10 mg tablet Take 1 Tab by mouth daily. 90 Tab 3    metoprolol succinate (TOPROL-XL) 50 mg XL tablet TAKE 1 TABLET BY MOUTH EVERY DAY 90 Tab 3    pravastatin (PRAVACHOL) 80 mg tablet Take 1 Tab by mouth nightly. 90 Tab 3    vitamin c-vitamin e (CRANBERRY CONCENTRATE) cap Take 1 Tab by mouth daily.  aspirin (SINDY CHILDRENS ASPIRIN) 81 mg chewable tablet Take 81 mg by mouth daily.  cholecalciferol, vitamin d3, (VITAMIN D) 1,000 unit tablet Take 1,000 Units by mouth daily. Recommended healthy diet low in carbohydrates, fats, sodium and cholesterol. Recommended regular cardiovascular exercise 3-6 times per week for 30-60 minutes daily. Verbal and written instructions (see AVS) provided.   Patient expresses understanding of diagnosis and treatment plan.

## 2017-08-08 NOTE — PROGRESS NOTES
Chief Complaint   Patient presents with    Dizziness    Urinary Frequency       Health Maintenance reviewed

## 2017-08-08 NOTE — PATIENT INSTRUCTIONS
Frequent Urination: Care Instructions  Your Care Instructions  An urge to urinate frequently but usually passing only small amounts of urine is a common symptom of urinary problems, such as urinary tract infections. The bladder may become inflamed. This can cause the urge to urinate. You may try to urinate more often than usual to try to soothe that urge. Frequent urination also may be caused by sexually transmitted infections (STIs) or kidney stones. Or it may happen when something irritates the tube that carries urine from the bladder to the outside of the body (urethra). It may also be a sign of diabetes. The cause may be hard to find. You may need tests. Follow-up care is a key part of your treatment and safety. Be sure to make and go to all appointments, and call your doctor if you are having problems. It's also a good idea to know your test results and keep a list of the medicines you take. How can you care for yourself at home? · Drink extra water for the next day or two. This will help make the urine less concentrated. (If you have kidney, heart, or liver disease and have to limit fluids, talk with your doctor before you increase the amount of fluids you drink.)  · Avoid drinks that are carbonated or have caffeine. They can irritate the bladder. For women:  · Urinate right after you have sex. · After you go to the bathroom, wipe from front to back. · Avoid douches, bubble baths, and feminine hygiene sprays. And avoid other feminine hygiene products that have deodorants. When should you call for help? Call your doctor now or seek immediate medical care if:  · You have new symptoms, such as fever, nausea, or vomiting. · You have new or worse symptoms of a urinary problem. For example:  ¨ You have blood or pus in your urine. ¨ You have chills or body aches. ¨ It hurts to urinate. ¨ You have groin or belly pain. ¨ You have pain in your back just below your rib cage (the flank area).   Watch closely for changes in your health, and be sure to contact your doctor if you feel thirstier than usual.  Where can you learn more? Go to http://ivette-nida.info/. Enter 578 3107 in the search box to learn more about \"Frequent Urination: Care Instructions. \"  Current as of: May 5, 2017  Content Version: 11.3  © 8471-3602 [email or web address]. Care instructions adapted under license by [employer] (which disclaims liability or warranty for this information). If you have questions about a medical condition or this instruction, always ask your healthcare professional. [place] [address] any warranty or liability for your use of this information.

## 2017-08-09 LAB
ALBUMIN SERPL-MCNC: 4.6 G/DL (ref 3.6–4.8)
ALBUMIN/GLOB SERPL: 2 {RATIO} (ref 1.2–2.2)
ALP SERPL-CCNC: 90 IU/L (ref 39–117)
ALT SERPL-CCNC: 12 IU/L (ref 0–32)
AST SERPL-CCNC: 18 IU/L (ref 0–40)
BACTERIA UR CULT: NORMAL
BASOPHILS # BLD AUTO: 0 X10E3/UL (ref 0–0.2)
BASOPHILS NFR BLD AUTO: 1 %
BILIRUB SERPL-MCNC: <0.2 MG/DL (ref 0–1.2)
BUN SERPL-MCNC: 17 MG/DL (ref 8–27)
BUN/CREAT SERPL: 23 (ref 12–28)
CALCIUM SERPL-MCNC: 9.9 MG/DL (ref 8.7–10.3)
CHLORIDE SERPL-SCNC: 100 MMOL/L (ref 96–106)
CO2 SERPL-SCNC: 27 MMOL/L (ref 18–29)
CREAT SERPL-MCNC: 0.74 MG/DL (ref 0.57–1)
EOSINOPHIL # BLD AUTO: 0.3 X10E3/UL (ref 0–0.4)
EOSINOPHIL NFR BLD AUTO: 4 %
ERYTHROCYTE [DISTWIDTH] IN BLOOD BY AUTOMATED COUNT: 13.2 % (ref 12.3–15.4)
EST. AVERAGE GLUCOSE BLD GHB EST-MCNC: 108 MG/DL
GLOBULIN SER CALC-MCNC: 2.3 G/DL (ref 1.5–4.5)
GLUCOSE SERPL-MCNC: 99 MG/DL (ref 65–99)
HBA1C MFR BLD: 5.4 % (ref 4.8–5.6)
HCT VFR BLD AUTO: 37.4 % (ref 34–46.6)
HCV AB S/CO SERPL IA: 0.1 S/CO RATIO (ref 0–0.9)
HGB BLD-MCNC: 12.1 G/DL (ref 11.1–15.9)
IMM GRANULOCYTES # BLD: 0 X10E3/UL (ref 0–0.1)
IMM GRANULOCYTES NFR BLD: 0 %
LYMPHOCYTES # BLD AUTO: 2.2 X10E3/UL (ref 0.7–3.1)
LYMPHOCYTES NFR BLD AUTO: 30 %
MCH RBC QN AUTO: 29.8 PG (ref 26.6–33)
MCHC RBC AUTO-ENTMCNC: 32.4 G/DL (ref 31.5–35.7)
MCV RBC AUTO: 92 FL (ref 79–97)
MONOCYTES # BLD AUTO: 0.6 X10E3/UL (ref 0.1–0.9)
MONOCYTES NFR BLD AUTO: 7 %
NEUTROPHILS # BLD AUTO: 4.4 X10E3/UL (ref 1.4–7)
NEUTROPHILS NFR BLD AUTO: 58 %
PLATELET # BLD AUTO: 293 X10E3/UL (ref 150–379)
POTASSIUM SERPL-SCNC: 4.8 MMOL/L (ref 3.5–5.2)
PROT SERPL-MCNC: 6.9 G/DL (ref 6–8.5)
RBC # BLD AUTO: 4.06 X10E6/UL (ref 3.77–5.28)
SODIUM SERPL-SCNC: 141 MMOL/L (ref 134–144)
TSH SERPL DL<=0.005 MIU/L-ACNC: 2.05 UIU/ML (ref 0.45–4.5)
WBC # BLD AUTO: 7.5 X10E3/UL (ref 3.4–10.8)

## 2017-08-10 NOTE — PROGRESS NOTES
No diabetes! Kidney and liver function are normal.  Hope she's feeling better on the antibiotic for UTI.

## 2017-09-12 ENCOUNTER — OFFICE VISIT (OUTPATIENT)
Dept: FAMILY MEDICINE CLINIC | Age: 72
End: 2017-09-12

## 2017-09-12 VITALS
TEMPERATURE: 97.6 F | BODY MASS INDEX: 22.82 KG/M2 | DIASTOLIC BLOOD PRESSURE: 69 MMHG | RESPIRATION RATE: 12 BRPM | HEART RATE: 58 BPM | SYSTOLIC BLOOD PRESSURE: 124 MMHG | HEIGHT: 65 IN | OXYGEN SATURATION: 96 % | WEIGHT: 137 LBS

## 2017-09-12 DIAGNOSIS — S93.431A SPRAIN OF TIBIOFIBULAR LIGAMENT OF RIGHT ANKLE, INITIAL ENCOUNTER: Primary | ICD-10-CM

## 2017-09-12 DIAGNOSIS — M25.571 ACUTE RIGHT ANKLE PAIN: ICD-10-CM

## 2017-09-12 DIAGNOSIS — Z23 ENCOUNTER FOR IMMUNIZATION: ICD-10-CM

## 2017-09-12 NOTE — MR AVS SNAPSHOT
Visit Information Date & Time Provider Department Dept. Phone Encounter #  
 9/12/2017  9:40 AM Adonis Flores MD [address] [facility] [phone] [id number] Upcoming Health Maintenance Date Due DTaP/Tdap/Td series (1 - Tdap) 12/11/1968 ZOSTER VACCINE AGE 60> 10/11/2007 OSTEOPOROSIS SCREENING (DEXA) 12/11/2012 GLAUCOMA SCREENING Q2Y 11/21/2015 COLONOSCOPY 5/21/2016 BREAST CANCER SCRN MAMMOGRAM 6/16/2017 INFLUENZA AGE 9 TO ADULT 8/1/2017 MEDICARE YEARLY EXAM 2/8/2018 Allergies as of 9/12/2017  Review Complete On: 9/12/2017 By: Adonis Flores MD  
  
 Severity Noted Reaction Type Reactions Amoxicillin  12/06/2010    Rash Amoxicillin  11/07/2011   Side Effect Rash  
 Sulfa (Sulfonamide Antibiotics)  01/14/2013   Intolerance Nausea Only Current Immunizations  Reviewed on 10/4/2016 Name Date Influenza High Dose Vaccine PF 10/4/2016, 10/6/2015, 11/11/2014 Influenza Vaccine 10/23/2013 Influenza Vaccine Split 11/16/2012 Influenza Vaccine Whole 10/1/2010 Pneumococcal Polysaccharide (PPSV-23) 5/21/2014 Varicella Virus Vaccine Live 6/19/2012 ZZZ-RETIRED (DO NOT USE) Pneumococcal Vaccine (Unspecified Type) 1/1/2007 Not reviewed this visit Vitals BP Pulse Temp Resp Height(growth percentile) Weight(growth percentile) 124/69 (BP 1 Location: Left arm, BP Patient Position: Sitting) (!) 58 97.6 °F (36.4 °C) 12 5' 5\" (1.651 m) 137 lb (62.1 kg) SpO2 BMI OB Status Smoking Status 96% 22.8 kg/m2 Postmenopausal Never Smoker BMI and BSA Data Body Mass Index Body Surface Area  
 22.8 kg/m 2 1.69 m 2 Preferred Pharmacy Pharmacy Name Phone [facility], [facility] [address] [phone] Your Updated Medication List  
  
   
This list is accurate as of: 9/12/17 10:15 AM.  Always use your most recent med list.  
  
  
  
  
 SINDY CHILDRENS ASPIRIN 81 mg chewable tablet Generic drug:  aspirin Take 81 mg by mouth daily. CRANBERRY CONCENTRATE Cap Generic drug:  vitamin c-vitamin e Take 1 Tab by mouth daily. lisinopril 10 mg tablet Commonly known as:  Harmony Fess Take 1 Tab by mouth daily. metoprolol succinate 50 mg XL tablet Commonly known as:  TOPROL-XL  
TAKE 1 TABLET BY MOUTH EVERY DAY  
  
 pravastatin 80 mg tablet Commonly known as:  PRAVACHOL Take 1 Tab by mouth nightly. PROBIOTIC 4X 10-15 mg Tbec Generic drug:  B.infantis-B.ani-B.long-B.bifi Take  by mouth. VITAMIN D3 1,000 unit tablet Generic drug:  cholecalciferol Take 1,000 Units by mouth daily. Patient Instructions Ankle Sprain: Rehab Exercises Your Care Instructions Here are some examples of typical rehabilitation exercises for your condition. Start each exercise slowly. Ease off the exercise if you start to have pain. Your doctor or physical therapist will tell you when you can start these exercises and which ones will work best for you. How to do the exercises \"Alphabet\" exercise 1. Trace the alphabet with your toe. This helps your ankle move in all directions. Side-to-side knee swing exercise 1. Sit in a chair with your foot flat on the floor. 2. Slowly move your knee from side to side. Keep your foot pressed flat. 3. Continue this exercise for 2 to 3 minutes. Towel curl 1. While sitting, place your foot on a towel on the floor. Scrunch the towel toward you with your toes. 2. Then use your toes to push the towel away from you. 3. To make this exercise more challenging you can put something on the other end of the towel. A can of soup is about the right weight for this. Towel stretch 1. Sit with your legs extended and knees straight. 2. Place a towel around your foot just under the toes. 3. Hold each end of the towel in each hand, with your hands above your knees. 4. Pull back with the towel so that your foot stretches toward you. 5. Hold the position for at least 15 to 30 seconds. 6. Repeat 2 to 4 times a session. Do up to 5 sessions a day. Ankle eversion exercise 1. Start by sitting with your foot flat on the floor. Push your foot outward against a wall or a piece of furniture that doesn't move. Hold for about 6 seconds, and relax. Repeat 8 to 12 times. 2. After you feel comfortable with this, try using rubber tubing looped around the outside of your feet for resistance. Push your foot out to the side against the tubing, and then count to 10 as you slowly bring your foot back to the middle. Repeat 8 to 12 times. Isometric opposition exercises 1. While sitting, put your feet together flat on the floor. 2. Press your injured foot inward against your other foot. Hold for about 6 seconds, and relax. Repeat 8 to 12 times. 3. Then place the heel of your other foot on top of the injured one. Push down with the top heel while trying to push up with your injured foot. Hold for about 6 seconds, and relax. Repeat 8 to 12 times. Resisted ankle inversion 1. Sit on the floor with your good leg crossed over your other leg. 2. Hold both ends of an exercise band and loop the band around the inside of your affected foot. Then press your other foot against the band. 3. Keeping your legs crossed, slowly push your affected foot against the band so that foot moves away from your other foot. Then slowly relax. 4. Repeat 8 to 12 times. Resisted ankle eversion 1. Sit on the floor with your legs straight. 2. Hold both ends of an exercise band and loop the band around the outside of your affected foot. Then press your other foot against the band. 3. Keeping your leg straight, slowly push your affected foot outward against the band and away from your other foot without letting your leg rotate. Then slowly relax. 4. Repeat 8 to 12 times. Resisted ankle dorsiflexion 1. Tie the ends of an exercise band together to form a loop. Attach one end of the loop to a secure object or shut a door on it to hold it in place. (Or you can have someone hold one end of the loop to provide resistance.) 2. While sitting on the floor or in a chair, loop the other end of the band over the top of your affected foot. 3. Keeping your knee and leg straight, slowly flex your foot to pull back on the exercise band, and then slowly relax. 4. Repeat 8 to 12 times. Single-leg balance 1. Stand on a flat surface with your arms stretched out to your sides like you are making the letter \"T. \" Then lift your good leg off the floor, bending it at the knee. If you are not steady on your feet, use one hand to hold on to a chair, counter, or wall. 2. Standing on the leg with your affected ankle, keep that knee straight. Try to balance on that leg for up to 30 seconds. Then rest for up to 10 seconds. 3. Repeat 6 to 8 times. 4. When you can balance on your affected leg for 30 seconds with your eyes open, try to balance on it with your eyes closed. When you can do this exercise with your eyes closed for 30 seconds and with ease and no pain, try standing on a pillow or piece of foam, and repeat steps 1 through 4. Follow-up care is a key part of your treatment and safety. Be sure to make and go to all appointments, and call your doctor if you are having problems. It's also a good idea to know your test results and keep a list of the medicines you take. Where can you learn more? Go to http://ivette-nida.info/. Maribeth Dewey in the search box to learn more about \"Ankle Sprain: Rehab Exercises. \" Current as of: March 21, 2017 Content Version: 11.3 © 2176-0206 [employer]. Care instructions adapted under license by "[employer]" (which disclaims liability or warranty for this information).  If you have questions about a medical condition or this instruction, always ask your healthcare professional. [address] any warranty or liability for your use of this information. Introducing [facility]! Salvador Smith introduces JumpChat patient portal. Now you can access parts of your medical record, email your doctor's office, and request medication refills online. 1. In your internet browser, go to [email or web address] 2. Click on the First Time User? Click Here link in the Sign In box. You will see the New Member Sign Up page. 3. Enter your JumpChat Access Code exactly as it appears below. You will not need to use this code after youve completed the sign-up process. If you do not sign up before the expiration date, you must request a new code. · JumpChat Access Code: [email or web address] Expires: 9/28/2017  9:59 AM 
 
4. Enter the last four digits of your Social Security Number (xxxx) and Date of Birth (mm/dd/yyyy) as indicated and click Submit. You will be taken to the next sign-up page. 5. Create a JumpChat ID. This will be your JumpChat login ID and cannot be changed, so think of one that is secure and easy to remember. 6. Create a JumpChat password. You can change your password at any time. 7. Enter your Password Reset Question and Answer. This can be used at a later time if you forget your password. 8. Enter your e-mail address. You will receive e-mail notification when new information is available in [address] 9. Click Sign Up. You can now view and download portions of your medical record. 10. Click the Download Summary menu link to download a portable copy of your medical information. If you have questions, please visit the Frequently Asked Questions section of the JumpChat website. Remember, JumpChat is NOT to be used for urgent needs. For medical emergencies, dial 911. Now available from your iPhone and Android! Please provide this summary of care documentation to your next provider. Your primary care clinician is listed as Jonathan Burgess. If you have any questions after today's visit, please call [phone].

## 2017-09-12 NOTE — PROGRESS NOTES
Debra Thomson is a 73 y.o. female who presents for Influenza immunization. She denies any symptoms , reactions or allergies that would exclude them from being immunized today. Risks and adverse reactions were discussed and the VIS was given to them. All questions were addressed. She was observed for 10 min post injection. There were no reactions observed.     Kourtney Gipson

## 2017-09-12 NOTE — PATIENT INSTRUCTIONS
Ankle Sprain: Rehab Exercises  Your Care Instructions  Here are some examples of typical rehabilitation exercises for your condition. Start each exercise slowly. Ease off the exercise if you start to have pain. Your doctor or physical therapist will tell you when you can start these exercises and which ones will work best for you. How to do the exercises  \"Alphabet\" exercise    1. Trace the alphabet with your toe. This helps your ankle move in all directions. Side-to-side knee swing exercise    1. Sit in a chair with your foot flat on the floor. 2. Slowly move your knee from side to side. Keep your foot pressed flat. 3. Continue this exercise for 2 to 3 minutes. Towel curl    1. While sitting, place your foot on a towel on the floor. Scrunch the towel toward you with your toes. 2. Then use your toes to push the towel away from you. 3. To make this exercise more challenging you can put something on the other end of the towel. A can of soup is about the right weight for this. Towel stretch    1. Sit with your legs extended and knees straight. 2. Place a towel around your foot just under the toes. 3. Hold each end of the towel in each hand, with your hands above your knees. 4. Pull back with the towel so that your foot stretches toward you. 5. Hold the position for at least 15 to 30 seconds. 6. Repeat 2 to 4 times a session. Do up to 5 sessions a day. Ankle eversion exercise    1. Start by sitting with your foot flat on the floor. Push your foot outward against a wall or a piece of furniture that doesn't move. Hold for about 6 seconds, and relax. Repeat 8 to 12 times. 2. After you feel comfortable with this, try using rubber tubing looped around the outside of your feet for resistance. Push your foot out to the side against the tubing, and then count to 10 as you slowly bring your foot back to the middle. Repeat 8 to 12 times. Isometric opposition exercises    1.  While sitting, put your feet together flat on the floor. 2. Press your injured foot inward against your other foot. Hold for about 6 seconds, and relax. Repeat 8 to 12 times. 3. Then place the heel of your other foot on top of the injured one. Push down with the top heel while trying to push up with your injured foot. Hold for about 6 seconds, and relax. Repeat 8 to 12 times. Resisted ankle inversion    1. Sit on the floor with your good leg crossed over your other leg. 2. Hold both ends of an exercise band and loop the band around the inside of your affected foot. Then press your other foot against the band. 3. Keeping your legs crossed, slowly push your affected foot against the band so that foot moves away from your other foot. Then slowly relax. 4. Repeat 8 to 12 times. Resisted ankle eversion    1. Sit on the floor with your legs straight. 2. Hold both ends of an exercise band and loop the band around the outside of your affected foot. Then press your other foot against the band. 3. Keeping your leg straight, slowly push your affected foot outward against the band and away from your other foot without letting your leg rotate. Then slowly relax. 4. Repeat 8 to 12 times. Resisted ankle dorsiflexion    1. Tie the ends of an exercise band together to form a loop. Attach one end of the loop to a secure object or shut a door on it to hold it in place. (Or you can have someone hold one end of the loop to provide resistance.)  2. While sitting on the floor or in a chair, loop the other end of the band over the top of your affected foot. 3. Keeping your knee and leg straight, slowly flex your foot to pull back on the exercise band, and then slowly relax. 4. Repeat 8 to 12 times. Single-leg balance    1. Stand on a flat surface with your arms stretched out to your sides like you are making the letter \"T. \" Then lift your good leg off the floor, bending it at the knee.  If you are not steady on your feet, use one hand to hold on to a chair, counter, or wall. 2. Standing on the leg with your affected ankle, keep that knee straight. Try to balance on that leg for up to 30 seconds. Then rest for up to 10 seconds. 3. Repeat 6 to 8 times. 4. When you can balance on your affected leg for 30 seconds with your eyes open, try to balance on it with your eyes closed. When you can do this exercise with your eyes closed for 30 seconds and with ease and no pain, try standing on a pillow or piece of foam, and repeat steps 1 through 4. Follow-up care is a key part of your treatment and safety. Be sure to make and go to all appointments, and call your doctor if you are having problems. It's also a good idea to know your test results and keep a list of the medicines you take. Where can you learn more? Go to http://ivetteAngel Alertsnida.info/. Cornelio Pulling in the search box to learn more about \"Ankle Sprain: Rehab Exercises. \"  Current as of: March 21, 2017  Content Version: 11.3  © 8892-4485 [employer]. Care instructions adapted under license by [employer] (which disclaims liability or warranty for this information). If you have questions about a medical condition or this instruction, always ask your healthcare professional. [address] any warranty or liability for your use of this information. Vaccine Information Statement    Influenza (Flu) Vaccine (Inactivated or Recombinant): What you need to know    Many Vaccine Information Statements are available in Italian and other languages. See www.immunize.org/vis  Hojas de Información Sobre Vacunas están disponibles en Español y en muchos otros idiomas. Visite www.immunize.org/vis    1. Why get vaccinated? Influenza (flu) is a contagious disease that spreads around the [place] every year, usually between October and May. Flu is caused by influenza viruses, and is spread mainly by coughing, sneezing, and close contact.      Anyone can get flu. Flu strikes suddenly and can last several days. Symptoms vary by age, but can include:   fever/chills   sore throat   muscle aches   fatigue   cough   headache    runny or stuffy nose    Flu can also lead to pneumonia and blood infections, and cause diarrhea and seizures in children. If you have a medical condition, such as heart or lung disease, flu can make it worse. Flu is more dangerous for some people. Infants and young children, people 72years of age and older, pregnant women, and people with certain health conditions or a weakened immune system are at greatest risk. Each year thousands of people in the [facility] die from flu, and many more are hospitalized. Flu vaccine can:   keep you from getting flu,   make flu less severe if you do get it, and   keep you from spreading flu to your family and other people. 2. Inactivated and recombinant flu vaccines    A dose of flu vaccine is recommended every flu season. Children 6 months through 6years of age may need two doses during the same flu season. Everyone else needs only one dose each flu season. Some inactivated flu vaccines contain a very small amount of a mercury-based preservative called thimerosal. Studies have not shown thimerosal in vaccines to be harmful, but flu vaccines that do not contain thimerosal are available. There is no live flu virus in flu shots. They cannot cause the flu. There are many flu viruses, and they are always changing. Each year a new flu vaccine is made to protect against three or four viruses that are likely to cause disease in the upcoming flu season. But even when the vaccine doesnt exactly match these viruses, it may still provide some protection    Flu vaccine cannot prevent:   flu that is caused by a virus not covered by the vaccine, or   illnesses that look like flu but are not.     It takes about 2 weeks for protection to develop after vaccination, and protection lasts through the flu season. 3. Some people should not get this vaccine    Tell the person who is giving you the vaccine:     If you have any severe, life-threatening allergies. If you ever had a life-threatening allergic reaction after a dose of flu vaccine, or have a severe allergy to any part of this vaccine, you may be advised not to get vaccinated. Most, but not all, types of flu vaccine contain a small amount of egg protein.  If you ever had Guillain-Barré Syndrome (also called GBS). Some people with a history of GBS should not get this vaccine. This should be discussed with your doctor.  If you are not feeling well. It is usually okay to get flu vaccine when you have a mild illness, but you might be asked to come back when you feel better. 4. Risks of a vaccine reaction    With any medicine, including vaccines, there is a chance of reactions. These are usually mild and go away on their own, but serious reactions are also possible. Most people who get a flu shot do not have any problems with it. Minor problems following a flu shot include:    soreness, redness, or swelling where the shot was given     hoarseness   sore, red or itchy eyes   cough   fever   aches   headache   itching   fatigue  If these problems occur, they usually begin soon after the shot and last 1 or 2 days. More serious problems following a flu shot can include the following:     There may be a small increased risk of Guillain-Barré Syndrome (GBS) after inactivated flu vaccine. This risk has been estimated at 1 or 2 additional cases per million people vaccinated. This is much lower than the risk of severe complications from flu, which can be prevented by flu vaccine.  Young children who get the flu shot along with pneumococcal vaccine (PCV13) and/or DTaP vaccine at the same time might be slightly more likely to have a seizure caused by fever. Ask your doctor for more information. Tell your doctor if a child who is getting flu vaccine has ever had a seizure. Problems that could happen after any injected vaccine:      People sometimes faint after a medical procedure, including vaccination. Sitting or lying down for about 15 minutes can help prevent fainting, and injuries caused by a fall. Tell your doctor if you feel dizzy, or have vision changes or ringing in the ears.  Some people get severe pain in the shoulder and have difficulty moving the arm where a shot was given. This happens very rarely.  Any medication can cause a severe allergic reaction. Such reactions from a vaccine are very rare, estimated at about 1 in a million doses, and would happen within a few minutes to a few hours after the vaccination. As with any medicine, there is a very remote chance of a vaccine causing a serious injury or death. The safety of vaccines is always being monitored. For more information, visit: www.cdc.gov/vaccinesafety/    5. What if there is a serious reaction? What should I look for?  Look for anything that concerns you, such as signs of a severe allergic reaction, very high fever, or unusual behavior. Signs of a severe allergic reaction can include hives, swelling of the face and throat, difficulty breathing, a fast heartbeat, dizziness, and weakness  usually within a few minutes to a few hours after the vaccination. What should I do?  If you think it is a severe allergic reaction or other emergency that cant wait, call 9-1-1 and get the person to the nearest hospital. Otherwise, call your doctor.  Reactions should be reported to the Vaccine Adverse Event Reporting System (VAERS). Your doctor should file this report, or you can do it yourself through  the VAERS web site at www.vaers. hhs.gov, or by calling 8-421.411.6854. VAERS does not give medical advice.     6. The National Vaccine Injury Compensation Program    The [facility] Vaccine Injury Compensation Program (VICP) is a federal program that was created to compensate people who may have been injured by certain vaccines. Persons who believe they may have been injured by a vaccine can learn about the program and about filing a claim by calling 5-210.346.8584 or visiting the [address] website at www.[facility].gov/vaccinecompensation. There is a time limit to file a claim for compensation. 7. How can I learn more?  Ask your healthcare provider. He or she can give you the vaccine package insert or suggest other sources of information.  Call your local or state health department.  Contact the Centers for Disease Control and Prevention (CDC):  - Call 5-597.184.7744 (1-800-CDC-INFO) or  - Visit CDCs website at www.cdc.gov/flu    Vaccine Information Statement   Inactivated Influenza Vaccine   8/7/2015  42 ISAIAS Pineda 574DT-21    Department of Health and Human Services  Centers for Disease Control and Prevention    Office Use Only

## 2017-09-19 ENCOUNTER — APPOINTMENT (OUTPATIENT)
Dept: GENERAL RADIOLOGY | Age: 72
End: 2017-09-19
Attending: EMERGENCY MEDICINE
Payer: MEDICARE

## 2017-09-19 ENCOUNTER — HOSPITAL ENCOUNTER (EMERGENCY)
Age: 72
Discharge: HOME | End: 2017-09-19
Attending: EMERGENCY MEDICINE
Payer: MEDICARE

## 2017-09-19 VITALS
HEART RATE: 58 BPM | WEIGHT: 136.91 LBS | RESPIRATION RATE: 18 BRPM | OXYGEN SATURATION: 100 % | DIASTOLIC BLOOD PRESSURE: 64 MMHG | SYSTOLIC BLOOD PRESSURE: 132 MMHG | BODY MASS INDEX: 22.78 KG/M2

## 2017-09-19 DIAGNOSIS — M54.9 UPPER BACK PAIN: ICD-10-CM

## 2017-09-19 DIAGNOSIS — R07.9 ACUTE CHEST PAIN: Primary | ICD-10-CM

## 2017-09-19 DIAGNOSIS — F41.1 ANXIETY STATE: ICD-10-CM

## 2017-09-19 LAB
ALBUMIN SERPL-MCNC: 3.8 G/DL (ref 3.5–5)
ALBUMIN/GLOB SERPL: 1.1 {RATIO} (ref 1.1–2.2)
ALP SERPL-CCNC: 104 U/L (ref 45–117)
ALT SERPL-CCNC: 21 U/L (ref 12–78)
ANION GAP SERPL CALC-SCNC: 7 MMOL/L (ref 5–15)
AST SERPL-CCNC: 16 U/L (ref 15–37)
ATRIAL RATE: 57 BPM
BASOPHILS # BLD: 0.1 K/UL (ref 0–0.1)
BASOPHILS NFR BLD: 1 % (ref 0–1)
BILIRUB SERPL-MCNC: 0.3 MG/DL (ref 0.2–1)
BUN SERPL-MCNC: 17 MG/DL (ref 6–20)
BUN/CREAT SERPL: 23 (ref 12–20)
CALCIUM SERPL-MCNC: 9 MG/DL (ref 8.5–10.1)
CALCULATED P AXIS, ECG09: 0 DEGREES
CALCULATED R AXIS, ECG10: 21 DEGREES
CALCULATED T AXIS, ECG11: 38 DEGREES
CHLORIDE SERPL-SCNC: 106 MMOL/L (ref 97–108)
CK SERPL-CCNC: 51 U/L (ref 26–192)
CO2 SERPL-SCNC: 27 MMOL/L (ref 21–32)
CREAT SERPL-MCNC: 0.73 MG/DL (ref 0.55–1.02)
DIAGNOSIS, 93000: NORMAL
EOSINOPHIL # BLD: 0.2 K/UL (ref 0–0.4)
EOSINOPHIL NFR BLD: 2 % (ref 0–7)
ERYTHROCYTE [DISTWIDTH] IN BLOOD BY AUTOMATED COUNT: 13.3 % (ref 11.5–14.5)
GLOBULIN SER CALC-MCNC: 3.6 G/DL (ref 2–4)
GLUCOSE SERPL-MCNC: 89 MG/DL (ref 65–100)
HCT VFR BLD AUTO: 36.6 % (ref 35–47)
HGB BLD-MCNC: 11.8 G/DL (ref 11.5–16)
LYMPHOCYTES # BLD: 2.4 K/UL (ref 0.8–3.5)
LYMPHOCYTES NFR BLD: 30 % (ref 12–49)
MCH RBC QN AUTO: 29.6 PG (ref 26–34)
MCHC RBC AUTO-ENTMCNC: 32.2 G/DL (ref 30–36.5)
MCV RBC AUTO: 92 FL (ref 80–99)
MONOCYTES # BLD: 0.5 K/UL (ref 0–1)
MONOCYTES NFR BLD: 6 % (ref 5–13)
NEUTS SEG # BLD: 4.7 K/UL (ref 1.8–8)
NEUTS SEG NFR BLD: 61 % (ref 32–75)
P-R INTERVAL, ECG05: 152 MS
PLATELET # BLD AUTO: 316 K/UL (ref 150–400)
POTASSIUM SERPL-SCNC: 4.8 MMOL/L (ref 3.5–5.1)
PROT SERPL-MCNC: 7.4 G/DL (ref 6.4–8.2)
Q-T INTERVAL, ECG07: 438 MS
QRS DURATION, ECG06: 86 MS
QTC CALCULATION (BEZET), ECG08: 426 MS
RBC # BLD AUTO: 3.98 M/UL (ref 3.8–5.2)
SODIUM SERPL-SCNC: 140 MMOL/L (ref 136–145)
TROPONIN I SERPL-MCNC: <0.04 NG/ML
VENTRICULAR RATE, ECG03: 57 BPM
WBC # BLD AUTO: 7.8 K/UL (ref 3.6–11)

## 2017-09-19 PROCEDURE — 36415 COLL VENOUS BLD VENIPUNCTURE: CPT | Performed by: EMERGENCY MEDICINE

## 2017-09-19 PROCEDURE — 82550 ASSAY OF CK (CPK): CPT | Performed by: EMERGENCY MEDICINE

## 2017-09-19 PROCEDURE — 99282 EMERGENCY DEPT VISIT SF MDM: CPT

## 2017-09-19 PROCEDURE — 71020 XR CHEST PA LAT: CPT

## 2017-09-19 PROCEDURE — 80053 COMPREHEN METABOLIC PANEL: CPT | Performed by: EMERGENCY MEDICINE

## 2017-09-19 PROCEDURE — 84484 ASSAY OF TROPONIN QUANT: CPT | Performed by: EMERGENCY MEDICINE

## 2017-09-19 PROCEDURE — 85025 COMPLETE CBC W/AUTO DIFF WBC: CPT | Performed by: EMERGENCY MEDICINE

## 2017-09-19 PROCEDURE — 93005 ELECTROCARDIOGRAM TRACING: CPT

## 2017-09-19 RX ORDER — HYDROCODONE BITARTRATE AND ACETAMINOPHEN 5; 325 MG/1; MG/1
1 TABLET ORAL
Qty: 20 TAB | Refills: 0 | Status: SHIPPED | OUTPATIENT
Start: 2017-09-19 | End: 2017-09-19

## 2017-09-19 RX ORDER — HYDROCODONE BITARTRATE AND ACETAMINOPHEN 5; 325 MG/1; MG/1
1 TABLET ORAL
Status: DISCONTINUED | OUTPATIENT
Start: 2017-09-19 | End: 2017-09-19 | Stop reason: HOSPADM

## 2017-09-19 RX ORDER — ALPRAZOLAM 0.25 MG/1
0.25 TABLET ORAL
Qty: 20 TAB | Refills: 0 | Status: SHIPPED | OUTPATIENT
Start: 2017-09-19 | End: 2018-09-25

## 2017-09-19 NOTE — DISCHARGE INSTRUCTIONS
Back Pain: Care Instructions  Your Care Instructions    Back pain has many possible causes. It is often related to problems with muscles and ligaments of the back. It may also be related to problems with the nerves, discs, or bones of the back. Moving, lifting, standing, sitting, or sleeping in an awkward way can strain the back. Sometimes you don't notice the injury until later. Arthritis is another common cause of back pain. Although it may hurt a lot, back pain usually improves on its own within several weeks. Most people recover in 12 weeks or less. Using good home treatment and being careful not to stress your back can help you feel better sooner. Follow-up care is a key part of your treatment and safety. Be sure to make and go to all appointments, and call your doctor if you are having problems. Its also a good idea to know your test results and keep a list of the medicines you take. How can you care for yourself at home? · Sit or lie in positions that are most comfortable and reduce your pain. Try one of these positions when you lie down:  ¨ Lie on your back with your knees bent and supported by large pillows. ¨ Lie on the floor with your legs on the seat of a sofa or chair. Isis Anusha on your side with your knees and hips bent and a pillow between your legs. ¨ Lie on your stomach if it does not make pain worse. · Do not sit up in bed, and avoid soft couches and twisted positions. Bed rest can help relieve pain at first, but it delays healing. Avoid bed rest after the first day of back pain. · Change positions every 30 minutes. If you must sit for long periods of time, take breaks from sitting. Get up and walk around, or lie in a comfortable position. · Try using a heating pad on a low or medium setting for 15 to 20 minutes every 2 or 3 hours. Try a warm shower in place of one session with the heating pad. · You can also try an ice pack for 10 to 15 minutes every 2 to 3 hours.  Put a thin cloth between the ice pack and your skin. · Take pain medicines exactly as directed. ¨ If the doctor gave you a prescription medicine for pain, take it as prescribed. ¨ If you are not taking a prescription pain medicine, ask your doctor if you can take an over-the-counter medicine. · Take short walks several times a day. You can start with 5 to 10 minutes, 3 or 4 times a day, and work up to longer walks. Walk on level surfaces and avoid hills and stairs until your back is better. · Return to work and other activities as soon as you can. Continued rest without activity is usually not good for your back. · To prevent future back pain, do exercises to stretch and strengthen your back and stomach. Learn how to use good posture, safe lifting techniques, and proper body mechanics. When should you call for help? Call your doctor now or seek immediate medical care if:  · You have new or worsening numbness in your legs. · You have new or worsening weakness in your legs. (This could make it hard to stand up.)  · You lose control of your bladder or bowels. Watch closely for changes in your health, and be sure to contact your doctor if:  · Your pain gets worse. · You are not getting better after 2 weeks. Where can you learn more? Go to http://ivette-nida.info/. Enter V779 in the search box to learn more about \"Back Pain: Care Instructions. \"  Current as of: March 21, 2017  Content Version: 11.3  © 0922-8592 [email or web address]. Care instructions adapted under license by "[employer] (which disclaims liability or warranty for this information). If you have questions about a medical condition or this instruction, always ask your healthcare professional. [address] any warranty or liability for your use of this information. Chest Pain: Care Instructions  Your Care Instructions  There are many things that can cause chest pain.  Some are not serious and will get better on their own in a few days. But some kinds of chest pain need more testing and treatment. Your doctor may have recommended a follow-up visit in the next 8 to 12 hours. If you are not getting better, you may need more tests or treatment. Even though your doctor has released you, you still need to watch for any problems. The doctor carefully checked you, but sometimes problems can develop later. If you have new symptoms or if your symptoms do not get better, get medical care right away. If you have worse or different chest pain or pressure that lasts more than 5 minutes or you passed out (lost consciousness), call 911 or seek other emergency help right away. A medical visit is only one step in your treatment. Even if you feel better, you still need to do what your doctor recommends, such as going to all suggested follow-up appointments and taking medicines exactly as directed. This will help you recover and help prevent future problems. How can you care for yourself at home? · Rest until you feel better. · Take your medicine exactly as prescribed. Call your doctor if you think you are having a problem with your medicine. · Do not drive after taking a prescription pain medicine. When should you call for help? Call 911 if:  · You passed out (lost consciousness). · You have severe difficulty breathing. · You have symptoms of a heart attack. These may include:  ¨ Chest pain or pressure, or a strange feeling in your chest.  ¨ Sweating. ¨ Shortness of breath. ¨ Nausea or vomiting. ¨ Pain, pressure, or a strange feeling in your back, neck, jaw, or upper belly or in one or both shoulders or arms. ¨ Lightheadedness or sudden weakness. ¨ A fast or irregular heartbeat. After you call 911, the  may tell you to chew 1 adult-strength or 2 to 4 low-dose aspirin. Wait for an ambulance. Do not try to drive yourself. Call your doctor today if:  · You have any trouble breathing.   · Your chest pain gets worse. · You are dizzy or lightheaded, or you feel like you may faint. · You are not getting better as expected. · You are having new or different chest pain. Where can you learn more? Go to http://ivette-nida.info/. Enter A120 in the search box to learn more about \"Chest Pain: Care Instructions. \"  Current as of: March 20, 2017  Content Version: 11.3  © 8910-5335 [employer]. Care instructions adapted under license by [employer] (which disclaims liability or warranty for this information). If you have questions about a medical condition or this instruction, always ask your healthcare professional. [place] [address] any warranty or liability for your use of this information.

## 2017-09-19 NOTE — ED PROVIDER NOTES
HPI Comments: Ivonne Murillo, 73 y.o. Female with PMHx significant for HTN, HCL, and CAD with CABG, presents ambulatory to ED [facility] with cc of 5/10 upper back pain that has been ongoing for 3-4 days and progressively worsened and radiated to her chest this morning. She also c/o one episode of 5/10 non-radiating central chest pain that lasted ~ 1 hour between [id number] this morning. She also c/o dizziness. She notes that she has had a recent stress test with no significant findings. Per daughter, the patient has been under increased stress. She denies a history of similar symptoms, recent injury, recent long trips, or recent surgeries. She specifically denies diaphoresis, nausea, SOB, cough, leg swelling, leg pain, or urinary symptoms. PCP: Robinson Crum MD  Cardiology: Vick De La Garza MD    Social history significant for: - Tobacco, + EtOH, - Illicit Drug Use    There are no other complaints, changes, or physical findings at this time. Written by Helen Baldwin ED Scriblizette, as dictated by Juanita Campos MD.    The history is provided by the patient. No  was used. Past Medical History:   Diagnosis Date    Back pain     CAD (coronary artery disease)     CABG, [place]    Hypercholesterolemia     Hypertension        Past Surgical History:   Procedure Laterality Date    HX CORONARY ARTERY BYPASS GRAFT  2002    HX GYN      HX TOTAL VAGINAL HYSTERECTOMY  2014         Family History:   Problem Relation Age of Onset    Heart Attack Mother     Heart Disease Mother     Heart Disease Father     Heart Disease Sister     Emphysema Sister        Social History     Social History    Marital status:      Spouse name: N/A    Number of children: N/A    Years of education: N/A     Occupational History    Not on file.      Social History Main Topics    Smoking status: Never Smoker    Smokeless tobacco: Never Used    Alcohol use Yes      Comment: occ    Drug use: No    Sexual activity: No     Other Topics Concern    Not on file     Social History Narrative     2014, living alone now. ALLERGIES: Amoxicillin; Amoxicillin; and Sulfa (sulfonamide antibiotics)    Review of Systems   Constitutional: Negative for diaphoresis. HENT: Negative for congestion. Eyes: Negative. Respiratory: Negative for shortness of breath. Cardiovascular: Positive for chest pain. Negative for leg swelling. Gastrointestinal: Negative for nausea. Endocrine: Negative for heat intolerance. Genitourinary: Negative for difficulty urinating, dysuria, frequency, hematuria and urgency. Musculoskeletal: Positive for back pain. Negative for myalgias. Skin: Negative for rash. Allergic/Immunologic: Negative for immunocompromised state. Neurological: Positive for dizziness. Hematological: Does not bruise/bleed easily. Psychiatric/Behavioral: Negative. All other systems reviewed and are negative. Vitals:    09/19/17 1137 09/19/17 1234 09/19/17 1325   BP: 133/69 147/80 132/64   Pulse: (!) 58 60 (!) 58   Resp: 16 16 18   SpO2: 100% 99% 100%   Weight: 62.1 kg (136 lb 14.5 oz)              Physical Exam   Constitutional: She is oriented to person, place, and time. She appears well-developed and well-nourished. No distress. No acute distress   HENT:   Head: Normocephalic and atraumatic. Eyes: EOM are normal.   Neck: Normal range of motion. Neck supple. Cardiovascular: Normal rate, regular rhythm and normal heart sounds. Pulmonary/Chest: Effort normal and breath sounds normal. No respiratory distress. Pulmonary: Lungs are clear  Chest wall: Non-tender   Abdominal: Soft. Bowel sounds are normal. She exhibits no mass. There is no tenderness. Musculoskeletal: Normal range of motion. She exhibits no edema. Back is non-tender   Neurological: She is alert and oriented to person, place, and time. Coordination normal.   Skin: Skin is warm and dry.    Psychiatric: She has a normal mood and affect. Nursing note and vitals reviewed. MDM  Number of Diagnoses or Management Options  Acute chest pain:   Anxiety state:   Upper back pain:   Diagnosis management comments:   DDx: CAD, musculoskeletal pain, pancreatitis, reflux, gastritis, anxiety       Amount and/or Complexity of Data Reviewed  Clinical lab tests: ordered and reviewed  Tests in the radiology section of CPT®: ordered and reviewed  Tests in the medicine section of CPT®: ordered and reviewed  Review and summarize past medical records: yes  Independent visualization of images, tracings, or specimens: yes    Patient Progress  Patient progress: stable       ED Course       Procedures     EKG interpretation: (Preliminary) 11:38  Rhythm: sinus bradycardia; and regular . Rate (approx.): 57; Axis: normal; NV interval: normal; QRS interval: normal ; ST/T wave: normal; Other findings: No significant change. Written by Martha Blackman, ED Scribe, as dictated by Octaviano Mccracken MD.    Progress Note:  3:39 PM  At time of discharge, the patient states that she attributes her chest pain to be related to stress. Initially was going to prescribed the patient pain medication, but she requests a prescription for stress instead. Will prescribed Xanax. The patient's EKG, lab, and imaging results were discussed with her, and she conveys her understanding.   Written by Martha Blackman ED Scribe, as dictated by Octaviano Mccracken MD.    LABORATORY TESTS:  Recent Results (from the past 12 hour(s))   EKG, 12 LEAD, INITIAL    Collection Time: 09/19/17 11:38 AM   Result Value Ref Range    Ventricular Rate 57 BPM    Atrial Rate 57 BPM    P-R Interval 152 ms    QRS Duration 86 ms    Q-T Interval 438 ms    QTC Calculation (Bezet) 426 ms    Calculated P Axis 0 degrees    Calculated R Axis 21 degrees    Calculated T Axis 38 degrees    Diagnosis       Sinus bradycardia  Otherwise normal ECG  When compared with ECG of 07-DEC-2010 07:12,  No significant change was found     CBC WITH AUTOMATED DIFF    Collection Time: 09/19/17  1:40 PM   Result Value Ref Range    WBC 7.8 3.6 - 11.0 K/uL    RBC 3.98 3.80 - 5.20 M/uL    HGB 11.8 11.5 - 16.0 g/dL    HCT 36.6 35.0 - 47.0 %    MCV 92.0 80.0 - 99.0 FL    MCH 29.6 26.0 - 34.0 PG    MCHC 32.2 30.0 - 36.5 g/dL    RDW 13.3 11.5 - 14.5 %    PLATELET 793 061 - 981 K/uL    NEUTROPHILS 61 32 - 75 %    LYMPHOCYTES 30 12 - 49 %    MONOCYTES 6 5 - 13 %    EOSINOPHILS 2 0 - 7 %    BASOPHILS 1 0 - 1 %    ABS. NEUTROPHILS 4.7 1.8 - 8.0 K/UL    ABS. LYMPHOCYTES 2.4 0.8 - 3.5 K/UL    ABS. MONOCYTES 0.5 0.0 - 1.0 K/UL    ABS. EOSINOPHILS 0.2 0.0 - 0.4 K/UL    ABS. BASOPHILS 0.1 0.0 - 0.1 K/UL   METABOLIC PANEL, COMPREHENSIVE    Collection Time: 09/19/17  1:40 PM   Result Value Ref Range    Sodium 140 136 - 145 mmol/L    Potassium 4.8 3.5 - 5.1 mmol/L    Chloride 106 97 - 108 mmol/L    CO2 27 21 - 32 mmol/L    Anion gap 7 5 - 15 mmol/L    Glucose 89 65 - 100 mg/dL    BUN 17 6 - 20 MG/DL    Creatinine 0.73 0.55 - 1.02 MG/DL    BUN/Creatinine ratio 23 (H) 12 - 20      GFR est AA >60 >60 ml/min/1.73m2    GFR est non-AA >60 >60 ml/min/1.73m2    Calcium 9.0 8.5 - 10.1 MG/DL    Bilirubin, total 0.3 0.2 - 1.0 MG/DL    ALT (SGPT) 21 12 - 78 U/L    AST (SGOT) 16 15 - 37 U/L    Alk. phosphatase 104 45 - 117 U/L    Protein, total 7.4 6.4 - 8.2 g/dL    Albumin 3.8 3.5 - 5.0 g/dL    Globulin 3.6 2.0 - 4.0 g/dL    A-G Ratio 1.1 1.1 - 2.2     TROPONIN I    Collection Time: 09/19/17  1:40 PM   Result Value Ref Range    Troponin-I, Qt. <0.04 <0.05 ng/mL   CK W/ REFLX CKMB    Collection Time: 09/19/17  1:40 PM   Result Value Ref Range    CK 51 26 - 192 U/L       IMAGING RESULTS:  CXR Results  (Last 48 hours)               09/19/17 1426  XR CHEST PA LAT Final result    Impression:  IMPRESSION:        Prior CABG. Borderline cardiomegaly. No acute process.                Narrative:  EXAM:  XR CHEST PA LAT       INDICATION:  cp       COMPARISON:  11/1/2012        FINDINGS: PA and lateral radiographs of the chest demonstrate clear lungs. The patient   has undergone prior median sternotomy and CABG. There is poorly cardiomegaly but   no vascular congestion or pleural fluid. There are mild degenerative changes   in the spine. MEDICATIONS GIVEN:  Medications   HYDROcodone-acetaminophen (NORCO) 5-325 mg per tablet 1 Tab (not administered)       IMPRESSION:  1. Acute chest pain    2. Upper back pain    3. Anxiety state        PLAN:  1. Current Discharge Medication List      START taking these medications    Details   ALPRAZolam (XANAX) 0.25 mg tablet Take 1 Tab by mouth every eight (8) hours as needed for Anxiety. Max Daily Amount: 0.75 mg. Qty: 20 Tab, Refills: 0           2. Follow-up Information     Follow up With Details Comments [address], [place] Call in 1 day  [address] [address] [phone]      [facility] EMERGENCY DEPT  If symptoms worsen [address] [place] [address]  [phone]        Return to ED if worse     Discharge Note:  3:39 PM  The pt is ready for discharge. The pt's signs, symptoms, diagnosis, and discharge instructions have been discussed and pt has conveyed their understanding. The pt is to follow up as recommended or return to ER should their symptoms worsen. Plan has been discussed and pt is in agreement. This note is prepared by Lili Cortez, acting as a Scribe for Ashley Hall MD.    Ashley Hall MD: The scribe's documentation has been prepared under my direction and personally reviewed by me in its entirety. I confirm that the notes above accurately reflects all work, treatment, procedures, and medical decision making performed by me.

## 2017-12-28 ENCOUNTER — OFFICE VISIT (OUTPATIENT)
Dept: FAMILY MEDICINE CLINIC | Age: 72
End: 2017-12-28

## 2017-12-28 VITALS
OXYGEN SATURATION: 97 % | BODY MASS INDEX: 22.82 KG/M2 | DIASTOLIC BLOOD PRESSURE: 66 MMHG | TEMPERATURE: 97.6 F | SYSTOLIC BLOOD PRESSURE: 135 MMHG | HEART RATE: 53 BPM | HEIGHT: 65 IN | RESPIRATION RATE: 16 BRPM | WEIGHT: 137 LBS

## 2017-12-28 DIAGNOSIS — M79.18 MYOFASCIAL PAIN: Primary | ICD-10-CM

## 2017-12-28 DIAGNOSIS — M62.838 TRAPEZIUS MUSCLE SPASM: ICD-10-CM

## 2017-12-28 NOTE — PATIENT INSTRUCTIONS
Neck Strain : Rehab Exercises  Your Care Instructions  Here are some examples of typical rehabilitation exercises for your condition. Start each exercise slowly. Ease off the exercise if you start to have pain. Your doctor or physical therapist will tell you when you can start these exercises and which ones will work best for you. How to do the exercises  Neck rotation    1. Sit in a firm chair, or stand up straight. 2. Keeping your chin level, turn your head to the right, and hold for 15 to 30 seconds. 3. Turn your head to the left and hold for 15 to 30 seconds. 4. Repeat 2 to 4 times to each side. Neck stretches    1. Look straight ahead, and tip your right ear to your right shoulder. Do not let your left shoulder rise up as you tip your head to the right. 2. Hold for 15 to 30 seconds. 3. Tilt your head to the left. Do not let your right shoulder rise up as you tip your head to the left. 4. Hold for 15 to 30 seconds. 5. Repeat 2 to 4 times to each side. Forward neck flexion    1. Sit in a firm chair, or stand up straight. 2. Bend your head forward. 3. Hold for 15 to 30 seconds. 4. Repeat 2 to 4 times. Lateral (side) bend strengthening    1. With your right hand, place your first two fingers on your right temple. 2. Start to bend your head to the side while using gentle pressure from your fingers to keep your head from bending. 3. Hold for about 6 seconds. 4. Repeat 8 to 12 times. 5. Switch hands and repeat the same exercise on your left side. Forward bend strengthening    1. Place your first two fingers of either hand on your forehead. 2. Start to bend your head forward while using gentle pressure from your fingers to keep your head from bending. 3. Hold for about 6 seconds. 4. Repeat 8 to 12 times. Neutral position strengthening    1. Using one hand, place your fingertips on the back of your head at the top of your neck.   2. Start to bend your head backward while using gentle pressure from your fingers to keep your head from bending. 3. Hold for about 6 seconds. 4. Repeat 8 to 12 times. Chin tuck    1. Lie on the floor with a rolled-up towel under your neck. Your head should be touching the floor. 2. Slowly bring your chin toward your chest.  3. Hold for a count of 6, and then relax for up to 10 seconds. 4. Repeat 8 to 12 times. Follow-up care is a key part of your treatment and safety. Be sure to make and go to all appointments, and call your doctor if you are having problems. It's also a good idea to know your test results and keep a list of the medicines you take. Where can you learn more? Go to http://ivette-nida.info/. Enter M679 in the search box to learn more about \"Neck Strain or Sprain: Rehab Exercises. \"  Current as of: March 21, 2017  Content Version: 11.4  © 4109-7381 [employer]. Care instructions adapted under license by [employer] (which disclaims liability or warranty for this information). If you have questions about a medical condition or this instruction, always ask your healthcare professional. [address] any warranty or liability for your use of this information.

## 2017-12-28 NOTE — MR AVS SNAPSHOT
Visit Information Date & Time Provider Department Dept. Phone Encounter #  
 12/28/2017 11:00 AM Ramesh Nichols MD [address] [facility] 946 [phone] [id number] Upcoming Health Maintenance Date Due  
 GLAUCOMA SCREENING Q2Y 11/21/2015 MEDICARE YEARLY EXAM 2/8/2018 DTaP/Tdap/Td series (2 - Td) 9/1/2026 Allergies as of 12/28/2017  Review Complete On: 12/28/2017 By: Jeanette Wayne Severity Noted Reaction Type Reactions Amoxicillin  12/06/2010    Rash Amoxicillin  11/07/2011   Side Effect Rash  
 Sulfa (Sulfonamide Antibiotics)  01/14/2013   Intolerance Nausea Only Current Immunizations  Reviewed on 9/12/2017 Name Date Influenza High Dose Vaccine PF 9/12/2017, 10/4/2016, 10/6/2015, 11/11/2014 Influenza Vaccine 10/23/2013 Influenza Vaccine Split 11/16/2012 Influenza Vaccine Whole 10/1/2010 Pneumococcal Polysaccharide (PPSV-23) 5/21/2014 Tdap 9/1/2016 Varicella Virus Vaccine Live 6/19/2012 ZZZ-RETIRED (DO NOT USE) Pneumococcal Vaccine (Unspecified Type) 1/1/2007 Zoster Vaccine, Live 1/1/2011 Not reviewed this visit Vitals BP Pulse Temp Resp Height(growth percentile) Weight(growth percentile) 135/66 (BP 1 Location: Left arm, BP Patient Position: Sitting) (!) 53 97.6 °F (36.4 °C) 16 5' 5\" (1.651 m) 137 lb (62.1 kg) SpO2 BMI OB Status Smoking Status 97% 22.8 kg/m2 Postmenopausal Never Smoker BMI and BSA Data Body Mass Index Body Surface Area  
 22.8 kg/m 2 1.69 m 2 Preferred Pharmacy Pharmacy Name Phone [facility] [address] [phone] Your Updated Medication List  
  
   
This list is accurate as of: 12/28/17 12:10 PM.  Always use your most recent med list.  
  
  
  
  
 ALPRAZolam 0.25 mg tablet Commonly known as:  Alcus Quinteros Take 1 Tab by mouth every eight (8) hours as needed for Anxiety. Max Daily Amount: 0.75 mg. [employer] CHILDRENS ASPIRIN 81 mg chewable tablet Generic drug:  aspirin Take 81 mg by mouth daily. CRANBERRY CONCENTRATE Cap Generic drug:  vitamin c-vitamin e Take 1 Tab by mouth daily. lisinopril 10 mg tablet Commonly known as:  Arnold Files Take 1 Tab by mouth daily. metoprolol succinate 50 mg XL tablet Commonly known as:  TOPROL-XL  
TAKE 1 TABLET BY MOUTH EVERY DAY  
  
 pravastatin 80 mg tablet Commonly known as:  PRAVACHOL Take 1 Tab by mouth nightly. PROBIOTIC 4X 10-15 mg Tbec Generic drug:  B.infantis-B.ani-B.long-B.bifi Take  by mouth. VITAMIN D3 1,000 unit tablet Generic drug:  cholecalciferol Take 1,000 Units by mouth daily. Patient Instructions Neck Strain : Rehab Exercises Your Care Instructions Here are some examples of typical rehabilitation exercises for your condition. Start each exercise slowly. Ease off the exercise if you start to have pain. Your doctor or physical therapist will tell you when you can start these exercises and which ones will work best for you. How to do the exercises Neck rotation 1. Sit in a firm chair, or stand up straight. 2. Keeping your chin level, turn your head to the right, and hold for 15 to 30 seconds. 3. Turn your head to the left and hold for 15 to 30 seconds. 4. Repeat 2 to 4 times to each side. Neck stretches 1. Look straight ahead, and tip your right ear to your right shoulder. Do not let your left shoulder rise up as you tip your head to the right. 2. Hold for 15 to 30 seconds. 3. Tilt your head to the left. Do not let your right shoulder rise up as you tip your head to the left. 4. Hold for 15 to 30 seconds. 5. Repeat 2 to 4 times to each side. Forward neck flexion 1. Sit in a firm chair, or stand up straight. 2. Bend your head forward. 3. Hold for 15 to 30 seconds. 4. Repeat 2 to 4 times. Lateral (side) bend strengthening 1. With your right hand, place your first two fingers on your right temple. 2. Start to bend your head to the side while using gentle pressure from your fingers to keep your head from bending. 3. Hold for about 6 seconds. 4. Repeat 8 to 12 times. 5. Switch hands and repeat the same exercise on your left side. Forward bend strengthening 1. Place your first two fingers of either hand on your forehead. 2. Start to bend your head forward while using gentle pressure from your fingers to keep your head from bending. 3. Hold for about 6 seconds. 4. Repeat 8 to 12 times. Neutral position strengthening 1. Using one hand, place your fingertips on the back of your head at the top of your neck. 2. Start to bend your head backward while using gentle pressure from your fingers to keep your head from bending. 3. Hold for about 6 seconds. 4. Repeat 8 to 12 times. Chin tuck 1. Lie on the floor with a rolled-up towel under your neck. Your head should be touching the floor. 2. Slowly bring your chin toward your chest. 
3. Hold for a count of 6, and then relax for up to 10 seconds. 4. Repeat 8 to 12 times. Follow-up care is a key part of your treatment and safety. Be sure to make and go to all appointments, and call your doctor if you are having problems. It's also a good idea to know your test results and keep a list of the medicines you take. Where can you learn more? Go to http://ivette-nida.info/. Enter M679 in the search box to learn more about \"Neck Strain or Sprain: Rehab Exercises. \" Current as of: March 21, 2017 Content Version: 11.4 © 1649-1114 [employer]wise, Incorporated. Care instructions adapted under license by [employer] (which disclaims liability or warranty for this information).  If you have questions about a medical condition or this instruction, always ask your healthcare professional. [employer] disclaims any warranty or liability for your use of this information. Introducing [facility]! Fantasma Holland introduces [employer] patient portal. Now you can access parts of your medical record, email your doctor's office, and request medication refills online. 1. In your internet browser, go to [email or web address] 2. Click on the First Time User? Click Here link in the Sign In box. You will see the New Member Sign Up page. 3. Enter your [employer] Access Code exactly as it appears below. You will not need to use this code after youve completed the sign-up process. If you do not sign up before the expiration date, you must request a new code. · [employer] Access Code: [email or web address] Expires: 3/28/2018 10:39 AM 
 
4. Enter the last four digits of your Social Security Number (xxxx) and Date of Birth (mm/dd/yyyy) as indicated and click Submit. You will be taken to the next sign-up page. 5. Create a [employer] ID. This will be your [employer] login ID and cannot be changed, so think of one that is secure and easy to remember. 6. Create a [employer] password. You can change your password at any time. 7. Enter your Password Reset Question and Answer. This can be used at a later time if you forget your password. 8. Enter your e-mail address. You will receive e-mail notification when new information is available in [address] 9. Click Sign Up. You can now view and download portions of your medical record. 10. Click the Download Summary menu link to download a portable copy of your medical information. If you have questions, please visit the Frequently Asked Questions section of the [employer] website. Remember, [employer] is NOT to be used for urgent needs. For medical emergencies, dial 911. Now available from your iPhone and Android! Please provide this summary of care documentation to your next provider. Your primary care clinician is listed as Michael Youngblood. If you have any questions after today's visit, please call [phone].

## 2017-12-28 NOTE — PROGRESS NOTES
NICOLE  Lenore Ann is a 81 y.o. female who presents with right-sided neck pain. She has seen before this before. Last time she brought up was over the summer. I did provide her with stretches and exercises which she did not do. She is getting this right-sided neck pain every couple of months and when it comes on it lasts for a week or 2. She has not noticed a particular  precipitating event. This most recent episode started about a week ago. She has tried Aleve, ibuprofen without relief. Has not done any stretches, has not used a heating pad. Recall that she is under a lot of stress caring for various family members. Since I saw her last she has established with a psychiatrist, they have tried Celexa which made her tired, Prozac which gave her tinnitus, has Wellbutrin at the pharmacy but has not picked it up yet    PMHx:  Past Medical History:   Diagnosis Date    Back pain     CAD (coronary artery disease)     CABG, [place]    Hypercholesterolemia     Hypertension        Meds:   Current Outpatient Prescriptions   Medication Sig Dispense Refill    ALPRAZolam (XANAX) 0.25 mg tablet Take 1 Tab by mouth every eight (8) hours as needed for Anxiety. Max Daily Amount: 0.75 mg. 20 Tab 0    B.infantis-B.ani-B.long-B.bifi (PROBIOTIC 4X) 10-15 mg TbEC Take  by mouth.  lisinopril (PRINIVIL, ZESTRIL) 10 mg tablet Take 1 Tab by mouth daily. 90 Tab 3    metoprolol succinate (TOPROL-XL) 50 mg XL tablet TAKE 1 TABLET BY MOUTH EVERY DAY 90 Tab 3    pravastatin (PRAVACHOL) 80 mg tablet Take 1 Tab by mouth nightly. 90 Tab 3    vitamin c-vitamin e (CRANBERRY CONCENTRATE) cap Take 1 Tab by mouth daily.  aspirin ([employer] CHILDRENS ASPIRIN) 81 mg chewable tablet Take 81 mg by mouth daily.  cholecalciferol, vitamin d3, (VITAMIN D) 1,000 unit tablet Take 1,000 Units by mouth daily. Allergies:    Allergies   Allergen Reactions    Amoxicillin Rash    Amoxicillin Rash    Sulfa (Sulfonamide Antibiotics) Nausea Only       Smoker:  History   Smoking Status    Never Smoker   Smokeless Tobacco    Never Used       ETOH:   History   Alcohol Use    Yes     Comment: occ       FH:   Family History   Problem Relation Age of Onset    Heart Attack Mother     Heart Disease Mother     Heart Disease Father     Heart Disease Sister     Emphysema Sister        ROS:   As listed in HPI. In addition:  Constitutional:   No headache, fever, fatigue, weight loss or weight gain      Cardiac:    No chest pain      Resp:   No cough or shortness of breath      Neuro   No loss of consciousness, dizziness, seizures      Physical Exam:  Blood pressure 135/66, pulse (!) 53, temperature 97.6 °F (36.4 °C), resp. rate 16, height 5' 5\" (1.651 m), weight 137 lb (62.1 kg), SpO2 97 %. GEN: No apparent distress. Alert and oriented and responds to all questions appropriately. NEUROLOGIC:  No focal neurologic deficits. Strength and sensation grossly intact. Coordination and gait grossly intact. EXT: Well perfused. No edema. SKIN: No obvious rashes. Myofascial pain in the shoulder girdle, lateral trapezius appears to be the primary trigger point on the right, also has a medial trapezius trigger point and a neck muscle that I initially thought was the levator scapula but now think is part of the superior trapezius as well. Spurling's test is negative, there was some discomfort when looking to the right but rest of the range of motion was unremarkable. There is no radiculopathy, there is no numbness or tingling. There is no loss of strength       Assessment and Plan     Myofascial pain, superior trapezius trigger point  Elected to do a cold stretch technique. Stretched out the lateral superior trapezius trigger point using this cold stretch technique. Remarkably this solved her entire problem she had a full range of motion of the neck. There is no pain to palpation of the previously affected muscles.   We talked about how she can use a similar stretch with the help of heating pad and/or NSAIDs in future. Lateral superior trapezius trigger point appears to be primary myofascial point if we need to do trigger point injection in future    Underlying cause is not immediately clear, stress can certainly be a factor, she wears her purse on this side, appears to happen the day that she was in the kitchen cooking all day so could just be overwork      ICD-10-CM ICD-9-CM    1. Myofascial pain M79.1 729.1    2. Trapezius muscle spasm M62.838 728.85        AVS given.  Pt expressed understanding of instructions

## 2018-01-23 RX ORDER — PRAVASTATIN SODIUM 80 MG/1
80 TABLET ORAL
Qty: 90 TAB | Refills: 3 | Status: SHIPPED | OUTPATIENT
Start: 2018-01-23 | End: 2019-01-15 | Stop reason: SDUPTHER

## 2018-02-21 ENCOUNTER — HOSPITAL ENCOUNTER (OUTPATIENT)
Dept: LAB | Age: 73
Discharge: HOME | End: 2018-02-21
Payer: MEDICARE

## 2018-02-21 ENCOUNTER — OFFICE VISIT (OUTPATIENT)
Dept: FAMILY MEDICINE CLINIC | Age: 73
End: 2018-02-21

## 2018-02-21 VITALS
DIASTOLIC BLOOD PRESSURE: 76 MMHG | HEIGHT: 65 IN | WEIGHT: 137 LBS | RESPIRATION RATE: 14 BRPM | SYSTOLIC BLOOD PRESSURE: 130 MMHG | HEART RATE: 54 BPM | BODY MASS INDEX: 22.82 KG/M2 | TEMPERATURE: 97.9 F | OXYGEN SATURATION: 98 %

## 2018-02-21 DIAGNOSIS — Z00.00 ROUTINE GENERAL MEDICAL EXAMINATION AT A HEALTH CARE FACILITY: Primary | ICD-10-CM

## 2018-02-21 DIAGNOSIS — E78.5 HYPERLIPIDEMIA, UNSPECIFIED HYPERLIPIDEMIA TYPE: ICD-10-CM

## 2018-02-21 DIAGNOSIS — R35.0 URINARY FREQUENCY: ICD-10-CM

## 2018-02-21 DIAGNOSIS — K59.01 SLOW TRANSIT CONSTIPATION: ICD-10-CM

## 2018-02-21 DIAGNOSIS — I10 ESSENTIAL HYPERTENSION: ICD-10-CM

## 2018-02-21 LAB
BILIRUB UR QL STRIP: NEGATIVE
GLUCOSE UR-MCNC: NEGATIVE MG/DL
KETONES P FAST UR STRIP-MCNC: NEGATIVE MG/DL
PH UR STRIP: 7 [PH] (ref 4.6–8)
PROT UR QL STRIP: NEGATIVE
SP GR UR STRIP: 1.01 (ref 1–1.03)
UA UROBILINOGEN AMB POC: NORMAL (ref 0.2–1)
URINALYSIS CLARITY POC: CLEAR
URINALYSIS COLOR POC: YELLOW
URINE BLOOD POC: NEGATIVE
URINE LEUKOCYTES POC: NEGATIVE
URINE NITRITES POC: NEGATIVE

## 2018-02-21 PROCEDURE — 80061 LIPID PANEL: CPT

## 2018-02-21 PROCEDURE — 36415 COLL VENOUS BLD VENIPUNCTURE: CPT

## 2018-02-21 RX ORDER — ESCITALOPRAM OXALATE 5 MG/1
TABLET ORAL DAILY
COMMUNITY
End: 2018-09-25 | Stop reason: SINTOL

## 2018-02-21 NOTE — ACP (ADVANCE CARE PLANNING)
Advance Care Planning (ACP) Provider Conversation Snapshot    Date of ACP Conversation: 02/21/18  Deferred discussion

## 2018-02-21 NOTE — PROGRESS NOTES
Medicare Annual Wellness Visit    I have reviewed the patient's medical history in detail and updated the computerized patient record. History   Jacky Wang is a 81 y.o. female who presents quite a constipation for 2 weeks. However the middle of our discussion she expressed intense anxiety about cancers which led to a preventive health visit. Has been constipated for about 2 weeks. Has not had a bowel movement in the last week. That bowel movement was brought on with the help of Dulcolax. She is not taking Dulcolax every day. When she takes Dulcolax she does achieve a bowel movement. She has mild abdominal discomfort. Her appetite is good. She is having some urinary frequency that has increased since the constipation. She is worried about cancer. When asked what kind of cancer she is worried about \"all kinds\" but it sounds like colon cancer and bladder cancer are on the list.  She has had a colonoscopy in the last year and a small polyp was removed and she was told return in 5 years. We can screen for bladder cancer after a fashion by checking for blood in her urine. She also concern of pancreatic cancer. I am afraid there is no screening for pancreatic cancer. Evidently she has a family member in law who  of pancreatic cancer recently    She is seeing a psychiatrist and has been placed on Lexapro 5 mg. She is frustrated that she does not feel any different on this medication. Has been taking for 3 weeks    Past Medical History:   Diagnosis Date    Back pain     CAD (coronary artery disease)     CABG, [place]    Hypercholesterolemia     Hypertension       Past Surgical History:   Procedure Laterality Date    HX CORONARY ARTERY BYPASS GRAFT      HX GYN      HX TOTAL VAGINAL HYSTERECTOMY       Current Outpatient Prescriptions   Medication Sig Dispense Refill    escitalopram oxalate (LEXAPRO) 5 mg tablet Take  by mouth daily.       pravastatin (PRAVACHOL) 80 mg tablet Take 1 Tab by mouth nightly. 90 Tab 3    ALPRAZolam (XANAX) 0.25 mg tablet Take 1 Tab by mouth every eight (8) hours as needed for Anxiety. Max Daily Amount: 0.75 mg. 20 Tab 0    B.infantis-B.ani-B.long-B.bifi (PROBIOTIC 4X) 10-15 mg TbEC Take  by mouth.  lisinopril (PRINIVIL, ZESTRIL) 10 mg tablet Take 1 Tab by mouth daily. 90 Tab 3    metoprolol succinate (TOPROL-XL) 50 mg XL tablet TAKE 1 TABLET BY MOUTH EVERY DAY 90 Tab 3    vitamin c-vitamin e (CRANBERRY CONCENTRATE) cap Take 1 Tab by mouth daily.  aspirin ([employer] CHILDRENS ASPIRIN) 81 mg chewable tablet Take 81 mg by mouth daily.  cholecalciferol, vitamin d3, (VITAMIN D) 1,000 unit tablet Take 1,000 Units by mouth daily. Allergies   Allergen Reactions    Amoxicillin Rash    Amoxicillin Rash    Sulfa (Sulfonamide Antibiotics) Nausea Only     Family History   Problem Relation Age of Onset    Heart Attack Mother     Heart Disease Mother     Heart Disease Father     Heart Disease Sister     Emphysema Sister      Social History   Substance Use Topics    Smoking status: Never Smoker    Smokeless tobacco: Never Used    Alcohol use Yes      Comment: occ     Patient Active Problem List   Diagnosis Code    Hyperlipidemia E78.5    CAD (coronary artery disease) I25.10    Hypertension I10    GERD (gastroesophageal reflux disease) K21.9    S/P CABG x 1 Z95.1       Depression Risk Factor Screening:     PHQ over the last two weeks 2/21/2018   Little interest or pleasure in doing things Not at all   Feeling down, depressed or hopeless Not at all   Total Score PHQ 2 0     Alcohol Risk Factor Screening: On any occasion during the past 3 months, have you had more than 3 drinks containing alcohol? No    Do you average more than 7 drinks per week? No      Functional Ability and Level of Safety:     Hearing Loss   none    Activities of Daily Living   Self-care.    Requires assistance with: no ADLs    Fall Risk     Fall Risk Assessment, last 12 mths 2/21/2018   Able to walk? Yes   Fall in past 12 months? No     Abuse Screen   Patient is not abused    Review of Systems   ROS:  As listed in HPI. In addition:  Constitutional:   No headache, fever, fatigue, weight loss or weight gain      Eyes:   No redness, pruritis, pain, visual changes, swelling, or discharge      Ears:    No pain, loss or changes in hearing     Cardiac:    No chest pain      Resp:   No cough or shortness of breath      Neuro   No loss of consciousness, dizziness, seizure    Physical Examination     Evaluation of Cognitive Function:  Mood/affect:  happy  Appearance: age appropriate  Family member/caregiver input: na    Physical Exam:  Blood pressure 130/76, pulse (!) 54, temperature 97.9 °F (36.6 °C), resp. rate 14, height 5' 5\" (1.651 m), weight 137 lb (62.1 kg), SpO2 98 %. GEN: No apparent distress. Alert and oriented and responds to all questions appropriately. ABDOMEN: Soft; nontender; nondistended; normoactive bowel sounds; no masses or organomegaly  NEUROLOGIC:  No focal neurologic deficits. Strength and sensation grossly intact. Coordination and gait grossly intact. EXT: Well perfused. No edema. SKIN: No obvious rashes. Patient Care Team:  Soledad Gilbert MD as PCP - General (Family Practice)  Raissa Quintero MD (Cardiology)    Advice/Referrals/Counseling   Education and counseling provided:    Glaucoma screening with Dr. Cristino Stahl last week    Taking aspirin a day    Taking vitamin D3 daily    Colonoscopy up-to-date    Mammogram up-to-date    Pap smears have not been indicated for years due to hysterectomy    Assessment/Plan     Hyperlipidemia  Has had blood panel done recently but no lipid panel, get this today. Urinary frequency  Pristine urinalysis, this is primarily obtained because of a concern about bladder cancer. Not a perfect test but screening for blood is reasonable given her concern.   Keep a eye out for her urinary frequency after constipation has resolved. If she is still concerned consider referral to urology so she is not losing sleep over this. Constipation  Primary focus of the visit, educated in appropriate approach to constipation, Dulcolax if it works for her taken every day to achieve a daily bowel movement. Of 1 dose of Dulcolax does not work consider a Dulcolax suppository to achieve a daily bowel movements  Colace stool softener  Benefiber fiber supplement. When you are having more regular bowel movements back off on these medications slowly to still achieve a daily bowel movement      ICD-10-CM ICD-9-CM    1. Routine general medical examination at a health care facility Z00.00 V70.0    2. Hyperlipidemia, unspecified hyperlipidemia type E78.5 272.4 LIPID PANEL   3. Essential hypertension I10 401.9    4. Urinary frequency R35.0 788.41 AMB POC URINALYSIS DIP STICK AUTO W/O MICRO   5.  Slow transit constipation K59.01 564.01

## 2018-02-22 LAB
CHOLEST SERPL-MCNC: 190 MG/DL (ref 100–199)
HDLC SERPL-MCNC: 88 MG/DL
INTERPRETATION, 910389: NORMAL
LDLC SERPL CALC-MCNC: 81 MG/DL (ref 0–99)
TRIGL SERPL-MCNC: 105 MG/DL (ref 0–149)
VLDLC SERPL CALC-MCNC: 21 MG/DL (ref 5–40)

## 2018-03-09 RX ORDER — METOPROLOL SUCCINATE 50 MG/1
TABLET, EXTENDED RELEASE ORAL
Qty: 90 TAB | Refills: 3 | Status: SHIPPED | OUTPATIENT
Start: 2018-03-09 | End: 2019-02-28 | Stop reason: SDUPTHER

## 2018-04-25 RX ORDER — LISINOPRIL 10 MG/1
10 TABLET ORAL DAILY
Qty: 90 TAB | Refills: 3 | Status: SHIPPED | OUTPATIENT
Start: 2018-04-25 | End: 2019-04-29 | Stop reason: SDUPTHER

## 2018-05-31 ENCOUNTER — OFFICE VISIT (OUTPATIENT)
Dept: FAMILY MEDICINE CLINIC | Age: 73
End: 2018-05-31

## 2018-05-31 VITALS
OXYGEN SATURATION: 98 % | WEIGHT: 135.8 LBS | HEIGHT: 65 IN | BODY MASS INDEX: 22.63 KG/M2 | DIASTOLIC BLOOD PRESSURE: 69 MMHG | TEMPERATURE: 98.4 F | HEART RATE: 56 BPM | RESPIRATION RATE: 19 BRPM | SYSTOLIC BLOOD PRESSURE: 128 MMHG

## 2018-05-31 DIAGNOSIS — M54.2 NECK PAIN: ICD-10-CM

## 2018-05-31 DIAGNOSIS — K21.9 GASTROESOPHAGEAL REFLUX DISEASE WITHOUT ESOPHAGITIS: Primary | ICD-10-CM

## 2018-05-31 RX ORDER — OMEGA-3 FATTY ACIDS/FISH OIL 340-1000MG
1 CAPSULE ORAL 2 TIMES DAILY
COMMUNITY
End: 2021-01-05 | Stop reason: ALTCHOICE

## 2018-05-31 RX ORDER — PHENOL/SODIUM PHENOLATE
20 AEROSOL, SPRAY (ML) MUCOUS MEMBRANE DAILY
Qty: 30 TAB | Refills: 0 | Status: SHIPPED | OUTPATIENT
Start: 2018-05-31 | End: 2018-09-25

## 2018-05-31 NOTE — PROGRESS NOTES
Chief Complaint   Patient presents with    Neck Pain     still having neck pain since last visit    Heartburn     patient having heartburn for about a month     1. Have you been to the ER, urgent care clinic since your last visit? No   Hospitalized since your last visit? No      2. Have you seen or consulted any other health care providers outside of the [address] [place] [address] since your last visit?    Dr. Hima Baker eye doctor (dry eyes)

## 2018-05-31 NOTE — PROGRESS NOTES
NICOLE  Cristin Ramos is a 81 y.o. female who presents with persistent neck pain. She has brought this up with me in the past.  Typically her neck/shoulder girdle pain will bother her every now and then. It will last for a few days up to a week or 2 and then resolved. She has brought this up several times in the past.  It appeared that she had myofascial trigger points in the shoulder girdle area and we have employed a cold stretch technique with great effect. I have offered trigger point injection and stretches and exercises were ineffective but it so far she has not required this. What is unusual about this pain that she is describing today is that it has been present more or less daily for 6 months. About once a week it will flare up to a 10/10 and then the next day will be much less intense. Yesterday was 1 of her bad days. She took a single 200 mg Motrin and the pain was reduced from a 10 to a 6. She did not re-dose and felt like the medicine wore off after 3 hours. She did not do any stretches or exercises yesterday to help out with the discomfort. She has not done physical therapy for this. It is not affecting her sleep. There is no radiculopathy    Is also complaining of reflux. It seems to happen after she eats. It seems to happen after every meal.  It has been present for the last month. She can think of no particular inciting meal.  She was taking a PPI for many years but we were able to wean off of this to an occasional H2 blocker. She has been treating her reflux symptoms with an occasional H2 blocker and Tums but does not treat every day. Recall that she has several family members with health conditions that she has to take care of. She has been under a lot of stress in the past and it is on particularly stressful days in the past that she has had neck/shoulder girdle pain.   We did not discuss her mood today    PMHx:  Past Medical History:   Diagnosis Date    Back pain     CAD (coronary artery disease)     CABG, [place]    Dry eye     Hypercholesterolemia     Hypertension        Meds:   Current Outpatient Prescriptions   Medication Sig Dispense Refill    fish oil-omega-3 fatty acids (FISH OIL) 340-1,000 mg capsule Take 1 Cap by mouth two (2) times a day.  Omeprazole delayed release (PRILOSEC D/R) 20 mg tablet Take 1 Tab by mouth daily. 30 Tab 0    lisinopril (PRINIVIL, ZESTRIL) 10 mg tablet Take 1 Tab by mouth daily. 90 Tab 3    metoprolol succinate (TOPROL-XL) 50 mg XL tablet TAKE 1 TABLET BY MOUTH EVERY DAY 90 Tab 3    pravastatin (PRAVACHOL) 80 mg tablet Take 1 Tab by mouth nightly. 90 Tab 3    aspirin (SINDY CHILDRENS ASPIRIN) 81 mg chewable tablet Take 81 mg by mouth daily.  cholecalciferol, vitamin d3, (VITAMIN D) 1,000 unit tablet Take 1,000 Units by mouth daily.  escitalopram oxalate (LEXAPRO) 5 mg tablet Take  by mouth daily.  ALPRAZolam (XANAX) 0.25 mg tablet Take 1 Tab by mouth every eight (8) hours as needed for Anxiety. Max Daily Amount: 0.75 mg. 20 Tab 0    B.infantis-B.ani-B.long-B.bifi (PROBIOTIC 4X) 10-15 mg TbEC Take  by mouth.  vitamin c-vitamin e (CRANBERRY CONCENTRATE) cap Take 1 Tab by mouth daily. Allergies: Allergies   Allergen Reactions    Amoxicillin Rash    Amoxicillin Rash    Sulfa (Sulfonamide Antibiotics) Nausea Only       Smoker:  History   Smoking Status    Never Smoker   Smokeless Tobacco    Never Used       ETOH:   History   Alcohol Use    Yes     Comment: occ       FH:   Family History   Problem Relation Age of Onset    Heart Attack Mother     Heart Disease Mother     Heart Disease Father     Heart Disease Sister     Emphysema Sister        ROS:   As listed in HPI.  In addition:  Constitutional:   No headache, fever, fatigue, weight loss or weight gain      Cardiac:    No chest pain      Resp:   No cough or shortness of breath      Neuro   No loss of consciousness, dizziness, seizures      Physical Exam:  Blood pressure 128/69, pulse (!) 56, temperature 98.4 °F (36.9 °C), temperature source Oral, resp. rate 19, height 5' 5\" (1.651 m), weight 135 lb 12.8 oz (61.6 kg), SpO2 98 %. GEN: No apparent distress. Alert and oriented and responds to all questions appropriately. NEUROLOGIC:  No focal neurologic deficits. Strength and sensation grossly intact. Coordination and gait grossly intact. EXT: Well perfused. No edema. SKIN: No obvious rashes. Scalenes tight and nontender on the right. Medial trapezius has a mildly tender point but is not a true myofascial point. Working more laterally the trapezius remains tight but nontender. No rhomboid tenderness although this is also tight. Spurling's negative. Full range of motion, nontender       Assessment and Plan     Reflux  Try PPI for 1 month then back off to H2 blocker. Goal is to keep PPI from becoming a chronic medication. Neck pain  Has been myofascial pain in the past but is actually pretty good today. A lot of tight muscles but not particularly tender. He would reproduce some of her symptoms when palpating medial trapezius but not a true myofascial point. Because her chronicity of symptoms would be reasonable to check an x-ray  Physical therapy  Occasional and judicious use of NSAIDs      ICD-10-CM ICD-9-CM    1. Gastroesophageal reflux disease without esophagitis K21.9 530.81 Omeprazole delayed release (PRILOSEC D/R) 20 mg tablet   2. Neck pain M54.2 723.1 REFERRAL TO PHYSICAL THERAPY      XR SPINE CERV FLEX/EXT MAX 3 V       AVS given.  Pt expressed understanding of instructions

## 2018-05-31 NOTE — PATIENT INSTRUCTIONS
Neck Strain or Sprain: Rehab Exercises  Your Care Instructions  Here are some examples of typical rehabilitation exercises for your condition. Start each exercise slowly. Ease off the exercise if you start to have pain. Your doctor or physical therapist will tell you when you can start these exercises and which ones will work best for you. How to do the exercises  Neck rotation    1. Sit in a firm chair, or stand up straight. 2. Keeping your chin level, turn your head to the right, and hold for 15 to 30 seconds. 3. Turn your head to the left and hold for 15 to 30 seconds. 4. Repeat 2 to 4 times to each side. Neck stretches    1. Look straight ahead, and tip your right ear to your right shoulder. Do not let your left shoulder rise up as you tip your head to the right. 2. Hold for 15 to 30 seconds. 3. Tilt your head to the left. Do not let your right shoulder rise up as you tip your head to the left. 4. Hold for 15 to 30 seconds. 5. Repeat 2 to 4 times to each side. Forward neck flexion    1. Sit in a firm chair, or stand up straight. 2. Bend your head forward. 3. Hold for 15 to 30 seconds. 4. Repeat 2 to 4 times. Lateral (side) bend strengthening    1. With your right hand, place your first two fingers on your right temple. 2. Start to bend your head to the side while using gentle pressure from your fingers to keep your head from bending. 3. Hold for about 6 seconds. 4. Repeat 8 to 12 times. 5. Switch hands and repeat the same exercise on your left side. Forward bend strengthening    1. Place your first two fingers of either hand on your forehead. 2. Start to bend your head forward while using gentle pressure from your fingers to keep your head from bending. 3. Hold for about 6 seconds. 4. Repeat 8 to 12 times. Neutral position strengthening    1. Using one hand, place your fingertips on the back of your head at the top of your neck.   2. Start to bend your head backward while using gentle pressure from your fingers to keep your head from bending. 3. Hold for about 6 seconds. 4. Repeat 8 to 12 times. Chin tuck    1. Lie on the floor with a rolled-up towel under your neck. Your head should be touching the floor. 2. Slowly bring your chin toward your chest.  3. Hold for a count of 6, and then relax for up to 10 seconds. 4. Repeat 8 to 12 times. Follow-up care is a key part of your treatment and safety. Be sure to make and go to all appointments, and call your doctor if you are having problems. It's also a good idea to know your test results and keep a list of the medicines you take. Where can you learn more? Go to http://ivette-nida.info/. Enter M679 in the search box to learn more about \"Neck Strain or Sprain: Rehab Exercises. \"  Current as of: March 21, 2017  Content Version: 11.4  © 0803-5365 Healthwise, Incorporated. Care instructions adapted under license by [employer] (which disclaims liability or warranty for this information). If you have questions about a medical condition or this instruction, always ask your healthcare professional. [address] any warranty or liability for your use of this information.

## 2018-05-31 NOTE — MR AVS SNAPSHOT
[facility] 
 
 
 [address] [place] [address] 
[phone] Patient: Hipolito Blakely MRN: [id number] :1945 Visit Information Date & Time Provider Department Dept. Phone Encounter #  
 2018 10:00 AM Melissa Montalvo MD [address] [facility] [phone] [id number] Upcoming Health Maintenance Date Due  
 GLAUCOMA SCREENING Q2Y 2015 Influenza Age 5 to Adult 2018 MEDICARE YEARLY EXAM 2019 BREAST CANCER SCRN MAMMOGRAM 2019 COLONOSCOPY 3/15/2022 DTaP/Tdap/Td series (2 - Td) 2026 Allergies as of 2018  Review Complete On: 2018 By: Ella Coulter LPN Severity Noted Reaction Type Reactions Amoxicillin  2010    Rash Amoxicillin  2011   Side Effect Rash  
 Sulfa (Sulfonamide Antibiotics)  2013   Intolerance Nausea Only Current Immunizations  Reviewed on 2017 Name Date Influenza High Dose Vaccine PF 2017, 10/4/2016, 10/6/2015, 2014 Influenza Vaccine 10/23/2013 Influenza Vaccine Split 2012 Influenza Vaccine Whole 10/1/2010 Pneumococcal Polysaccharide (PPSV-23) 2014 Tdap 2016 Varicella Virus Vaccine Live 2012 ZZZ-RETIRED (DO NOT USE) Pneumococcal Vaccine (Unspecified Type) 2007 Zoster Vaccine, Live 2011 Not reviewed this visit You Were Diagnosed With   
  
 Codes Comments Gastroesophageal reflux disease without esophagitis    -  Primary ICD-10-CM: K21.9 ICD-9-CM: 530.81 Neck pain     ICD-10-CM: M54.2 ICD-9-CM: 723.1 Vitals BP Pulse Temp Resp Height(growth percentile) Weight(growth percentile) 128/69 (BP 1 Location: Right arm, BP Patient Position: Sitting) (!) 56 98.4 °F (36.9 °C) (Oral) 19 5' 5\" (1.651 m) 135 lb 12.8 oz (61.6 kg) SpO2 BMI OB Status Smoking Status 98% 22.6 kg/m2 Postmenopausal Never Smoker Vitals History BMI and BSA Data Body Mass Index Body Surface Area  
 22.6 kg/m 2 1.68 m 2 Preferred Pharmacy Pharmacy Name Phone [facility], [address] [place] [address] [phone] Your Updated Medication List  
  
   
This list is accurate as of 5/31/18 10:58 AM.  Always use your most recent med list.  
  
  
  
  
 ALPRAZolam 0.25 mg tablet Commonly known as:  Neelima Wade Take 1 Tab by mouth every eight (8) hours as needed for Anxiety. Max Daily Amount: 0.75 mg. SINDY CHILDRENS ASPIRIN 81 mg chewable tablet Generic drug:  aspirin Take 81 mg by mouth daily. CRANBERRY CONCENTRATE Cap Generic drug:  vitamin c-vitamin e Take 1 Tab by mouth daily. FISH -1,000 mg capsule Generic drug:  fish oil-omega-3 fatty acids Take 1 Cap by mouth two (2) times a day. LEXAPRO 5 mg tablet Generic drug:  escitalopram oxalate Take  by mouth daily. lisinopril 10 mg tablet Commonly known as:  Mechele Livings Take 1 Tab by mouth daily. metoprolol succinate 50 mg XL tablet Commonly known as:  TOPROL-XL  
TAKE 1 TABLET BY MOUTH EVERY DAY Omeprazole delayed release 20 mg tablet Commonly known as:  PRILOSEC D/R Take 1 Tab by mouth daily. pravastatin 80 mg tablet Commonly known as:  PRAVACHOL Take 1 Tab by mouth nightly. PROBIOTIC 4X 10-15 mg Tbec Generic drug:  B.infantis-B.ani-B.long-B.bifi Take  by mouth. VITAMIN D3 1,000 unit tablet Generic drug:  cholecalciferol Take 1,000 Units by mouth daily. Prescriptions Sent to Pharmacy Refills Omeprazole delayed release (PRILOSEC D/R) 20 mg tablet 0 Sig: Take 1 Tab by mouth daily. Class: Normal  
 [phone] Route: Oral  
  
We Performed the Following REFERRAL TO PHYSICAL THERAPY [GCQ51 Custom] Comments:  
 Diagnosis: neck pain Please evaluate and treat. Initiate treatment immediately To-Do List   
 05/31/2018 Imaging:  XR SPINE CERV FLEX/EXT MAX 3 V Referral Information Referral ID Referred By Referred To  
  
 [id number] Diane RENNER Not Available Visits Status Start Date End Date 1 New Request 5/31/18 5/31/19 If your referral has a status of pending review or denied, additional information will be sent to support the outcome of this decision. Patient Instructions Neck Strain or Sprain: Rehab Exercises Your Care Instructions Here are some examples of typical rehabilitation exercises for your condition. Start each exercise slowly. Ease off the exercise if you start to have pain. Your doctor or physical therapist will tell you when you can start these exercises and which ones will work best for you. How to do the exercises Neck rotation 1. Sit in a firm chair, or stand up straight. 2. Keeping your chin level, turn your head to the right, and hold for 15 to 30 seconds. 3. Turn your head to the left and hold for 15 to 30 seconds. 4. Repeat 2 to 4 times to each side. Neck stretches 1. Look straight ahead, and tip your right ear to your right shoulder. Do not let your left shoulder rise up as you tip your head to the right. 2. Hold for 15 to 30 seconds. 3. Tilt your head to the left. Do not let your right shoulder rise up as you tip your head to the left. 4. Hold for 15 to 30 seconds. 5. Repeat 2 to 4 times to each side. Forward neck flexion 1. Sit in a firm chair, or stand up straight. 2. Bend your head forward. 3. Hold for 15 to 30 seconds. 4. Repeat 2 to 4 times. Lateral (side) bend strengthening 1. With your right hand, place your first two fingers on your right temple. 2. Start to bend your head to the side while using gentle pressure from your fingers to keep your head from bending. 3. Hold for about 6 seconds. 4. Repeat 8 to 12 times. 5. Switch hands and repeat the same exercise on your left side. Forward bend strengthening 1. Place your first two fingers of either hand on your forehead. 2. Start to bend your head forward while using gentle pressure from your fingers to keep your head from bending. 3. Hold for about 6 seconds. 4. Repeat 8 to 12 times. Neutral position strengthening 1. Using one hand, place your fingertips on the back of your head at the top of your neck. 2. Start to bend your head backward while using gentle pressure from your fingers to keep your head from bending. 3. Hold for about 6 seconds. 4. Repeat 8 to 12 times. Chin tuck 1. Lie on the floor with a rolled-up towel under your neck. Your head should be touching the floor. 2. Slowly bring your chin toward your chest. 
3. Hold for a count of 6, and then relax for up to 10 seconds. 4. Repeat 8 to 12 times. Follow-up care is a key part of your treatment and safety. Be sure to make and go to all appointments, and call your doctor if you are having problems. It's also a good idea to know your test results and keep a list of the medicines you take. Where can you learn more? Go to http://ivette-nida.info/. Enter M679 in the search box to learn more about \"Neck Strain or Sprain: Rehab Exercises. \" Current as of: March 21, 2017 Content Version: 11.4 © 2526-1025 Healthwise, Incorporated. Care instructions adapted under license by [employer] (which disclaims liability or warranty for this information). If you have questions about a medical condition or this instruction, always ask your healthcare professional. [address] any warranty or liability for your use of this information. Introducing [facility]! [facility] introduces DocTree patient portal. Now you can access parts of your medical record, email your doctor's office, and request medication refills online. 1. In your internet browser, go to [email or web address] 2. Click on the First Time User? Click Here link in the Sign In box. You will see the New Member Sign Up page. 3. Enter your [email or web address] Access Code exactly as it appears below. You will not need to use this code after youve completed the sign-up process. If you do not sign up before the expiration date, you must request a new code. · [email or web address] Access Code: [email or web address] Expires: 8/29/2018 10:12 AM 
 
4. Enter the last four digits of your Social Security Number (xxxx) and Date of Birth (mm/dd/yyyy) as indicated and click Submit. You will be taken to the next sign-up page. 5. Create a Amerpagest ID. This will be your [email or web address] login ID and cannot be changed, so think of one that is secure and easy to remember. 6. Create a [email or web address] password. You can change your password at any time. 7. Enter your Password Reset Question and Answer. This can be used at a later time if you forget your password. 8. Enter your e-mail address. You will receive e-mail notification when new information is available in [address] 9. Click Sign Up. You can now view and download portions of your medical record. 10. Click the Download Summary menu link to download a portable copy of your medical information. If you have questions, please visit the Frequently Asked Questions section of the [email or web address] website. Remember, [email or web address] is NOT to be used for urgent needs. For medical emergencies, dial 911. Now available from your iPhone and Android! Please provide this summary of care documentation to your next provider. Your primary care clinician is listed as Donis Lr. If you have any questions after today's visit, please call [phone].

## 2018-06-01 ENCOUNTER — TELEPHONE (OUTPATIENT)
Dept: FAMILY MEDICINE CLINIC | Age: 73
End: 2018-06-01

## 2018-06-01 ENCOUNTER — HOSPITAL ENCOUNTER (OUTPATIENT)
Dept: GENERAL RADIOLOGY | Age: 73
Discharge: HOME | End: 2018-06-01
Payer: MEDICARE

## 2018-06-01 DIAGNOSIS — M54.2 NECK PAIN: ICD-10-CM

## 2018-06-01 PROCEDURE — 72052 X-RAY EXAM NECK SPINE 6/>VWS: CPT

## 2018-06-01 NOTE — TELEPHONE ENCOUNTER
Ms. Nicole Cuba notified Dr. Anguiano Favors said Some neck arthritis noted on x-ray. My suggestion would be to proceed with physical therapy and see how much better this makes you feel. Physical therapy is always the first line option for arthritis. If you try physical therapy for about 6 weeks and are still having discomfort I would refer you to an orthopedic surgeon.   They have things that they can do for neck pain that do not involve surgery  She voiced understanding and will start PT

## 2018-06-01 NOTE — TELEPHONE ENCOUNTER
Some neck arthritis noted on x-ray. My suggestion would be to proceed with physical therapy and see how much better this makes you feel. Physical therapy is always the first line option for arthritis. If you try physical therapy for about 6 weeks and are still having discomfort I would refer you to an orthopedic surgeon.   They have things that they can do for neck pain that do not involve surgery

## 2018-08-23 ENCOUNTER — OFFICE VISIT (OUTPATIENT)
Dept: FAMILY MEDICINE CLINIC | Age: 73
End: 2018-08-23

## 2018-08-23 ENCOUNTER — HOSPITAL ENCOUNTER (OUTPATIENT)
Dept: LAB | Age: 73
Discharge: HOME | End: 2018-08-23
Payer: MEDICARE

## 2018-08-23 VITALS
HEIGHT: 65 IN | SYSTOLIC BLOOD PRESSURE: 129 MMHG | TEMPERATURE: 98.3 F | RESPIRATION RATE: 16 BRPM | OXYGEN SATURATION: 98 % | BODY MASS INDEX: 22.66 KG/M2 | DIASTOLIC BLOOD PRESSURE: 70 MMHG | WEIGHT: 136 LBS | HEART RATE: 52 BPM

## 2018-08-23 DIAGNOSIS — R30.0 DYSURIA: Primary | ICD-10-CM

## 2018-08-23 DIAGNOSIS — N30.00 ACUTE CYSTITIS WITHOUT HEMATURIA: ICD-10-CM

## 2018-08-23 DIAGNOSIS — R82.998 URINE LEUKOCYTES: ICD-10-CM

## 2018-08-23 LAB
BILIRUB UR QL STRIP: NEGATIVE
GLUCOSE UR-MCNC: NEGATIVE MG/DL
KETONES P FAST UR STRIP-MCNC: NEGATIVE MG/DL
PH UR STRIP: 7 [PH] (ref 4.6–8)
PROT UR QL STRIP: NEGATIVE
SP GR UR STRIP: 1.01 (ref 1–1.03)
UA UROBILINOGEN AMB POC: NORMAL (ref 0.2–1)
URINALYSIS CLARITY POC: CLEAR
URINALYSIS COLOR POC: YELLOW
URINE BLOOD POC: NEGATIVE
URINE LEUKOCYTES POC: NORMAL
URINE NITRITES POC: NEGATIVE

## 2018-08-23 PROCEDURE — 87086 URINE CULTURE/COLONY COUNT: CPT

## 2018-08-23 PROCEDURE — 87186 SC STD MICRODIL/AGAR DIL: CPT

## 2018-08-23 PROCEDURE — 87088 URINE BACTERIA CULTURE: CPT

## 2018-08-23 PROCEDURE — 87077 CULTURE AEROBIC IDENTIFY: CPT

## 2018-08-23 RX ORDER — NITROFURANTOIN 25; 75 MG/1; MG/1
100 CAPSULE ORAL 2 TIMES DAILY
Qty: 10 CAP | Refills: 0 | Status: SHIPPED | OUTPATIENT
Start: 2018-08-23 | End: 2018-08-28

## 2018-08-23 NOTE — PROGRESS NOTES
Chief Complaint   Patient presents with    Urinary Burning    Urinary Frequency    Nocturia     1. Have you been to the ER, urgent care clinic since your last visit? Hospitalized since your last visit? No    2. Have you seen or consulted any other health care providers outside of the [address] [place] [address] since your last visit? Include any pap smears or colon screening. No    Pt reports that her symptoms started on Monday, and progressively worsened. Pt did take AZO for symptoms for two days and no relief noted.

## 2018-08-23 NOTE — MR AVS SNAPSHOT
[address] [facility] 
 
 
 [address] [place] [address] 
[phone] Patient: Robinson Conner MRN: [id number] :1945 Visit Information Date & Time Provider Department Dept. Phone Encounter #  
 2018  2:30 PM Zainab Hilario, [address] [address] [phone] [id number] Upcoming Health Maintenance Date Due  
 GLAUCOMA SCREENING Q2Y 2015 Influenza Age 5 to Adult 2018 MEDICARE YEARLY EXAM 2019 BREAST CANCER SCRN MAMMOGRAM 2019 COLONOSCOPY 3/15/2022 DTaP/Tdap/Td series (2 - Td) 2026 Allergies as of 2018  Review Complete On: 2018 By: Zainab Hilario NP Severity Noted Reaction Type Reactions Amoxicillin  2010    Rash Amoxicillin  2011   Side Effect Rash  
 Sulfa (Sulfonamide Antibiotics)  2013   Intolerance Nausea Only Current Immunizations  Reviewed on 2017 Name Date Influenza High Dose Vaccine PF 2017, 10/4/2016, 10/6/2015, 2014 Influenza Vaccine 10/23/2013 Influenza Vaccine Split 2012 Influenza Vaccine Whole 10/1/2010 Pneumococcal Polysaccharide (PPSV-23) 2014 Tdap 2016 Varicella Virus Vaccine Live 2012 ZZZ-RETIRED (DO NOT USE) Pneumococcal Vaccine (Unspecified Type) 2007 Zoster Vaccine, Live 2011 Not reviewed this visit You Were Diagnosed With   
  
 Codes Comments Dysuria    -  Primary ICD-10-CM: R30.0 ICD-9-CM: 788.1 Urine leukocytes     ICD-10-CM: R82.99 
ICD-9-CM: 791.7 Acute cystitis without hematuria     ICD-10-CM: N30.00 ICD-9-CM: 595.0 Vitals BP Pulse Temp Resp Height(growth percentile) Weight(growth percentile) 129/70 (BP 1 Location: Left arm, BP Patient Position: Sitting) (!) 52 98.3 °F (36.8 °C) (Oral) 16 5' 5\" (1.651 m) 136 lb (61.7 kg) SpO2 BMI OB Status Smoking Status 98% 22.63 kg/m2 Postmenopausal Never Smoker Vitals History BMI and BSA Data Body Mass Index Body Surface Area  
 22.63 kg/m 2 1.68 m 2 Preferred Pharmacy Pharmacy Name Phone [facility], [place] [address] [phone] Your Updated Medication List  
  
   
This list is accurate as of 8/23/18  3:18 PM.  Always use your most recent med list.  
  
  
  
  
 ALPRAZolam 0.25 mg tablet Commonly known as:  Melene Reels Take 1 Tab by mouth every eight (8) hours as needed for Anxiety. Max Daily Amount: 0.75 mg. [employer] CHILDRENS ASPIRIN 81 mg chewable tablet Generic drug:  aspirin Take 81 mg by mouth daily. CRANBERRY CONCENTRATE Cap Generic drug:  vitamin c-vitamin e Take 1 Tab by mouth daily. FISH -1,000 mg capsule Generic drug:  fish oil-omega-3 fatty acids Take 1 Cap by mouth two (2) times a day. LEXAPRO 5 mg tablet Generic drug:  escitalopram oxalate Take  by mouth daily. lisinopril 10 mg tablet Commonly known as:  Marion November Take 1 Tab by mouth daily. metoprolol succinate 50 mg XL tablet Commonly known as:  TOPROL-XL  
TAKE 1 TABLET BY MOUTH EVERY DAY  
  
 nitrofurantoin (macrocrystal-monohydrate) 100 mg capsule Commonly known as:  MACROBID Take 1 Cap by mouth two (2) times a day for 5 days. Omeprazole delayed release 20 mg tablet Commonly known as:  PRILOSEC D/R Take 1 Tab by mouth daily. pravastatin 80 mg tablet Commonly known as:  PRAVACHOL Take 1 Tab by mouth nightly. PROBIOTIC 4X 10-15 mg Tbec Generic drug:  B.infantis-B.ani-B.long-B.bifi Take  by mouth. VITAMIN D3 1,000 unit tablet Generic drug:  cholecalciferol Take 1,000 Units by mouth daily. Prescriptions Sent to Pharmacy Refills  
 nitrofurantoin, macrocrystal-monohydrate, (MACROBID) 100 mg capsule 0 Sig: Take 1 Cap by mouth two (2) times a day for 5 days.   
 Class: Normal  
 Pharmacy: [facility] [id number]  #: [phone] Route: Oral  
  
We Performed the Following AMB POC URINALYSIS DIP STICK AUTO W/O MICRO [34383 CPT(R)] CULTURE, URINE [id number] CPT(R)] Patient Instructions Urinary Tract Infection in Women: Care Instructions Your Care Instructions A urinary tract infection, or UTI, is a general term for an infection anywhere between the kidneys and the urethra (where urine comes out). Most UTIs are bladder infections. They often cause pain or burning when you urinate. UTIs are caused by bacteria and can be cured with antibiotics. Be sure to complete your treatment so that the infection goes away. Follow-up care is a key part of your treatment and safety. Be sure to make and go to all appointments, and call your doctor if you are having problems. It's also a good idea to know your test results and keep a list of the medicines you take. How can you care for yourself at home? · Take your antibiotics as directed. Do not stop taking them just because you feel better. You need to take the full course of antibiotics. · Drink extra water and other fluids for the next day or two. This may help wash out the bacteria that are causing the infection. (If you have kidney, heart, or liver disease and have to limit fluids, talk with your doctor before you increase your fluid intake.) · Avoid drinks that are carbonated or have caffeine. They can irritate the bladder. · Urinate often. Try to empty your bladder each time. · To relieve pain, take a hot bath or lay a heating pad set on low over your lower belly or genital area. Never go to sleep with a heating pad in place. To prevent UTIs · Drink plenty of water each day. This helps you urinate often, which clears bacteria from your system. (If you have kidney, heart, or liver disease and have to limit fluids, talk with your doctor before you increase your fluid intake.) · Urinate when you need to. · Urinate right after you have sex. · Change sanitary pads often. · Avoid douches, bubble baths, feminine hygiene sprays, and other feminine hygiene products that have deodorants. · After going to the bathroom, wipe from front to back. When should you call for help? Call your doctor now or seek immediate medical care if: 
  · Symptoms such as fever, chills, nausea, or vomiting get worse or appear for the first time.  
  · You have new pain in your back just below your rib cage. This is called flank pain.  
  · There is new blood or pus in your urine.  
  · You have any problems with your antibiotic medicine.  
 Watch closely for changes in your health, and be sure to contact your doctor if: 
  · You are not getting better after taking an antibiotic for 2 days.  
  · Your symptoms go away but then come back. Where can you learn more? Go to http://ivette-nida.info/. Enter Q972 in the search box to learn more about \"Urinary Tract Infection in Women: Care Instructions. \" Current as of: May 12, 2017 Content Version: 11.7 © 7982-6812 [employer]. Care instructions adapted under license by [employer] (which disclaims liability or warranty for this information). If you have questions about a medical condition or this instruction, always ask your healthcare professional. [address] any warranty or liability for your use of this information. Introducing [facility]! [employer] introduces Queue-it patient portal. Now you can access parts of your medical record, email your doctor's office, and request medication refills online. 1. In your internet browser, go to [email or web address] 2. Click on the First Time User? Click Here link in the Sign In box. You will see the New Member Sign Up page. 3. Enter your Queue-it Access Code exactly as it appears below.  You will not need to use this code after youve completed the sign-up process. If you do not sign up before the expiration date, you must request a new code. · [email or web address] Access Code: [email or web address] Expires: 8/29/2018 10:12 AM 
 
4. Enter the last four digits of your Social Security Number (xxxx) and Date of Birth (mm/dd/yyyy) as indicated and click Submit. You will be taken to the next sign-up page. 5. Create a about.me ID. This will be your about.me login ID and cannot be changed, so think of one that is secure and easy to remember. 6. Create a about.me password. You can change your password at any time. 7. Enter your Password Reset Question and Answer. This can be used at a later time if you forget your password. 8. Enter your e-mail address. You will receive e-mail notification when new information is available in [address]. 9. Click Sign Up. You can now view and download portions of your medical record. 10. Click the Download Summary menu link to download a portable copy of your medical information. If you have questions, please visit the Frequently Asked Questions section of the about.me website. Remember, about.me is NOT to be used for urgent needs. For medical emergencies, dial 911. Now available from your iPhone and Android! Please provide this summary of care documentation to your next provider. Your primary care clinician is listed as Vanesa Chris. If you have any questions after today's visit, please call [phone].

## 2018-08-23 NOTE — PATIENT INSTRUCTIONS
Urinary Tract Infection in Women: Care Instructions  Your Care Instructions    A urinary tract infection, or UTI, is a general term for an infection anywhere between the kidneys and the urethra (where urine comes out). Most UTIs are bladder infections. They often cause pain or burning when you urinate. UTIs are caused by bacteria and can be cured with antibiotics. Be sure to complete your treatment so that the infection goes away. Follow-up care is a key part of your treatment and safety. Be sure to make and go to all appointments, and call your doctor if you are having problems. It's also a good idea to know your test results and keep a list of the medicines you take. How can you care for yourself at home? · Take your antibiotics as directed. Do not stop taking them just because you feel better. You need to take the full course of antibiotics. · Drink extra water and other fluids for the next day or two. This may help wash out the bacteria that are causing the infection. (If you have kidney, heart, or liver disease and have to limit fluids, talk with your doctor before you increase your fluid intake.)  · Avoid drinks that are carbonated or have caffeine. They can irritate the bladder. · Urinate often. Try to empty your bladder each time. · To relieve pain, take a hot bath or lay a heating pad set on low over your lower belly or genital area. Never go to sleep with a heating pad in place. To prevent UTIs  · Drink plenty of water each day. This helps you urinate often, which clears bacteria from your system. (If you have kidney, heart, or liver disease and have to limit fluids, talk with your doctor before you increase your fluid intake.)  · Urinate when you need to. · Urinate right after you have sex. · Change sanitary pads often. · Avoid douches, bubble baths, feminine hygiene sprays, and other feminine hygiene products that have deodorants.   · After going to the bathroom, wipe from front to back.  When should you call for help? Call your doctor now or seek immediate medical care if:    · Symptoms such as fever, chills, nausea, or vomiting get worse or appear for the first time.     · You have new pain in your back just below your rib cage. This is called flank pain.     · There is new blood or pus in your urine.     · You have any problems with your antibiotic medicine.    Watch closely for changes in your health, and be sure to contact your doctor if:    · You are not getting better after taking an antibiotic for 2 days.     · Your symptoms go away but then come back. Where can you learn more? Go to http://ivette-nida.info/. Enter N148 in the search box to learn more about \"Urinary Tract Infection in Women: Care Instructions. \"  Current as of: May 12, 2017  Content Version: 11.7  © 3618-8478 [employer]. Care instructions adapted under license by [employer] (which disclaims liability or warranty for this information). If you have questions about a medical condition or this instruction, always ask your healthcare professional. [address] any warranty or liability for your use of this information.

## 2018-08-23 NOTE — PROGRESS NOTES
Chief Complaint   Patient presents with    Urinary Burning    Urinary Frequency    Nocturia       Subjective:      Catarino Flood is a 81 y.o. female that presents today with a chief complaint of dysuria. The patient admits to accompanying urgency, frequency and nocturia. The patient denies nausea, vomiting, and fever. The patient has no history of recent or recurrent UTIs; last UTI was about a year ago. Has tried taking Azo over the counter which has not helped. Past Medical History:   Diagnosis Date    Back pain     CAD (coronary artery disease)     CABG, [place]    Dry eye     Hypercholesterolemia     Hypertension      Current Outpatient Prescriptions   Medication Sig    fish oil-omega-3 fatty acids (FISH OIL) 340-1,000 mg capsule Take 1 Cap by mouth two (2) times a day.  lisinopril (PRINIVIL, ZESTRIL) 10 mg tablet Take 1 Tab by mouth daily.  metoprolol succinate (TOPROL-XL) 50 mg XL tablet TAKE 1 TABLET BY MOUTH EVERY DAY    pravastatin (PRAVACHOL) 80 mg tablet Take 1 Tab by mouth nightly.  vitamin c-vitamin e (CRANBERRY CONCENTRATE) cap Take 1 Tab by mouth daily.  aspirin ([employer] CHILDRENS ASPIRIN) 81 mg chewable tablet Take 81 mg by mouth daily.  cholecalciferol, vitamin d3, (VITAMIN D) 1,000 unit tablet Take 1,000 Units by mouth daily.  Omeprazole delayed release (PRILOSEC D/R) 20 mg tablet Take 1 Tab by mouth daily.  escitalopram oxalate (LEXAPRO) 5 mg tablet Take  by mouth daily.  ALPRAZolam (XANAX) 0.25 mg tablet Take 1 Tab by mouth every eight (8) hours as needed for Anxiety. Max Daily Amount: 0.75 mg.    B.infantis-B.ani-B.long-B.bifi (PROBIOTIC 4X) 10-15 mg TbEC Take  by mouth. No current facility-administered medications for this visit.       Allergies   Allergen Reactions    Amoxicillin Rash    Amoxicillin Rash    Sulfa (Sulfonamide Antibiotics) Nausea Only     Social History   Substance Use Topics    Smoking status: Never Smoker    Smokeless tobacco: Never Used    Alcohol use Yes      Comment: occ       ROS per HPI. Objective:     Visit Vitals    /70 (BP 1 Location: Left arm, BP Patient Position: Sitting)    Pulse (!) 52    Temp 98.3 °F (36.8 °C) (Oral)    Resp 16    Ht 5' 5\" (1.651 m)    Wt 136 lb (61.7 kg)    SpO2 98%    BMI 22.63 kg/m2       Skin: no pallor, normal turgor  HEENT:  Normocephalic, no conjunctival inflammation, pupils equal round and reactive to light, MMM, no oral lesions  Heart: regular rate and rhythm, no murmurs, rubs or gallops  Lungs: no increased respiratory effort, clear to ausculation bilaterally  Abdomen: soft, mild suprapubic tenderness, not distended. Normal bowel sounds. No rebound or guarding. No bruits. Back: no costovertebral angle tenderness  Extremities:no edema or cyanosis  Neuro awake, alert and oriented    Results for orders placed or performed in visit on 08/23/18   AMB POC URINALYSIS DIP STICK AUTO W/O MICRO   Result Value Ref Range    Color (UA POC) Yellow     Clarity (UA POC) Clear     Glucose (UA POC) Negative Negative    Bilirubin (UA POC) Negative Negative    Ketones (UA POC) Negative Negative    Specific gravity (UA POC) 1.010 1.001 - 1.035    Blood (UA POC) Negative Negative    pH (UA POC) 7 4.6 - 8.0    Protein (UA POC) Negative Negative    Urobilinogen (UA POC) 0.2 mg/dL 0.2 - 1    Nitrites (UA POC) Negative Negative    Leukocyte esterase (UA POC) 1+ Negative         Assessment/Plan:   Differential diagnosis and treatment options reviewed with patient who is in agreement with treatment plan as outlined below. ICD-10-CM ICD-9-CM    1. Dysuria R30.0 788.1 AMB POC URINALYSIS DIP STICK AUTO W/O MICRO   2. Urine leukocytes R82.99 791.7 CULTURE, URINE     Will treat for UTI. UC sent. Medication profile discussed with patient. Plan:  1. Drink plenty of fluids. 2. You can use Uristat or Azostandard to help with symptoms. These medications can turn your urine an orange color.   Symptoms should resolve within 24-48 hours after starting the antibiotic. 3. Follow-up if symptoms not improving in 2-3 days. 4. If you develop fever, abdomenal pain, back pain, or nausea and vomiting then return to clinic or go to the emergency room. This could indicate that you have a more serious infection or and infection in the kidneys. Verbal and written instructions (see AVS) provided. Patient expresses understanding of diagnosis and treatment plan.

## 2018-08-26 LAB — BACTERIA UR CULT: ABNORMAL

## 2018-08-27 NOTE — PROGRESS NOTES
Contacted pt and verified name and . Informed pt of results, pt verbalized understanding, no questions at this time. Pt is feeling much better and is not having any more symptoms. Pt still has two days left of antibiotic. Informed pt to finish it. Pt verbalized understanding.

## 2018-08-27 NOTE — PROGRESS NOTES
Urine culture shows bacteria should be susceptible to the antibiotic that she was prescribed. Is she feeling better?

## 2018-09-25 ENCOUNTER — OFFICE VISIT (OUTPATIENT)
Dept: FAMILY MEDICINE CLINIC | Age: 73
End: 2018-09-25

## 2018-09-25 VITALS
HEART RATE: 49 BPM | TEMPERATURE: 98 F | WEIGHT: 138.8 LBS | SYSTOLIC BLOOD PRESSURE: 122 MMHG | HEIGHT: 65 IN | BODY MASS INDEX: 23.13 KG/M2 | RESPIRATION RATE: 16 BRPM | OXYGEN SATURATION: 98 % | DIASTOLIC BLOOD PRESSURE: 67 MMHG

## 2018-09-25 DIAGNOSIS — I10 ESSENTIAL HYPERTENSION: ICD-10-CM

## 2018-09-25 DIAGNOSIS — F41.9 ANXIETY AND DEPRESSION: Primary | ICD-10-CM

## 2018-09-25 DIAGNOSIS — R45.86 MOOD SWINGS: ICD-10-CM

## 2018-09-25 DIAGNOSIS — F32.A ANXIETY AND DEPRESSION: Primary | ICD-10-CM

## 2018-09-25 DIAGNOSIS — R23.2 HOT FLASHES: ICD-10-CM

## 2018-09-25 DIAGNOSIS — Z23 ENCOUNTER FOR IMMUNIZATION: ICD-10-CM

## 2018-09-25 RX ORDER — CALCIUM CARBONATE 600 MG
600 TABLET ORAL 2 TIMES DAILY
COMMUNITY
End: 2020-04-02 | Stop reason: ALTCHOICE

## 2018-09-25 RX ORDER — PAROXETINE 10 MG/1
10 TABLET, FILM COATED ORAL DAILY
Qty: 30 TAB | Refills: 0 | Status: SHIPPED | OUTPATIENT
Start: 2018-09-25 | End: 2018-10-24 | Stop reason: SDUPTHER

## 2018-09-25 NOTE — MR AVS SNAPSHOT
[facility] 
 
 
 [address] [place] [address] Monica Meek 1364 [facility] 
[phone] Patient: Nickie Jennings MRN: [id number] :1945 Visit Information Date & Time Provider Department Dept. Phone Encounter #  
 2018 10:30 AM Brant Mckeon NP Kylee Bragg [address] [id number] [phone] [id number] Follow-up Instructions Return in about 4 weeks (around 10/23/2018), or if symptoms worsen or fail to improve. Upcoming Health Maintenance Date Due Shingrix Vaccine Age 50> (1 of 2) 1997 GLAUCOMA SCREENING Q2Y 2015 Influenza Age 5 to Adult 2018 MEDICARE YEARLY EXAM 2019 BREAST CANCER SCRN MAMMOGRAM 2019 COLONOSCOPY 3/15/2022 DTaP/Tdap/Td series (2 - Td) 2026 Allergies as of 2018  Review Complete On: 2018 By: Brant Mckeon NP Severity Noted Reaction Type Reactions Amoxicillin  2010    Rash Amoxicillin  2011   Side Effect Rash  
 Sulfa (Sulfonamide Antibiotics)  2013   Intolerance Nausea Only Current Immunizations  Reviewed on 2017 Name Date Influenza High Dose Vaccine PF 2017, 10/4/2016, 10/6/2015, 2014 Influenza Vaccine 10/23/2013 Influenza Vaccine (Tri) Adjuvanted 2018 Influenza Vaccine Split 2012 Influenza Vaccine Whole 10/1/2010 Pneumococcal Polysaccharide (PPSV-23) 2014 Tdap 2016 Varicella Virus Vaccine Live 2012 ZZZ-RETIRED (DO NOT USE) Pneumococcal Vaccine (Unspecified Type) 2007 Zoster Vaccine, Live 2011 Not reviewed this visit You Were Diagnosed With   
  
 Codes Comments Anxiety and depression    -  Primary ICD-10-CM: F41.9, F32.9 ICD-9-CM: 300.00, 311 Encounter for immunization     ICD-10-CM: Z22 ICD-9-CM: V03.89 Mood swings ([address]     ICD-10-CM: F39 
ICD-9-CM: 296.99 Hot flashes     ICD-10-CM: R23.2 ICD-9-CM: 782.62   
 Essential hypertension     ICD-10-CM: I10 
ICD-9-CM: 401.9 Vitals BP Pulse Temp Resp Height(growth percentile) Weight(growth percentile) 122/67 (BP 1 Location: Right arm, BP Patient Position: Sitting) (!) 49 98 °F (36.7 °C) (Oral) 16 5' 5\" (1.651 m) 138 lb 12.8 oz (63 kg) SpO2 BMI OB Status Smoking Status 98% 23.1 kg/m2 Postmenopausal Never Smoker Vitals History BMI and BSA Data Body Mass Index Body Surface Area  
 23.1 kg/m 2 1.7 m 2 Preferred Pharmacy Pharmacy Name Phone [facility] [address] [phone] Your Updated Medication List  
  
   
This list is accurate as of 9/25/18 11:27 AM.  Always use your most recent med list.  
  
  
  
  
 SINDY CHILDRENS ASPIRIN 81 mg chewable tablet Generic drug:  aspirin Take 81 mg by mouth daily. calcium carbonate 600 mg calcium (1,500 mg) tablet Commonly known as:  Junnie Jojo Take 600 mg by mouth two (2) times a day. FISH -1,000 mg capsule Generic drug:  fish oil-omega-3 fatty acids Take 1 Cap by mouth two (2) times a day. lisinopril 10 mg tablet Commonly known as:  Chu Wing Take 1 Tab by mouth daily. metoprolol succinate 50 mg XL tablet Commonly known as:  TOPROL-XL  
TAKE 1 TABLET BY MOUTH EVERY DAY PARoxetine 10 mg tablet Commonly known as:  PAXIL Take 1 Tab by mouth daily. pravastatin 80 mg tablet Commonly known as:  PRAVACHOL Take 1 Tab by mouth nightly. VITAMIN D3 1,000 unit tablet Generic drug:  cholecalciferol Take 1,000 Units by mouth daily. Prescriptions Sent to Pharmacy Refills PARoxetine (PAXIL) 10 mg tablet 0 Sig: Take 1 Tab by mouth daily. Class: Normal  
 Pharmacy: [facility]  #: [phone] Route: Oral  
  
We Performed the Following ADMIN INFLUENZA VIRUS VAC [ [facility]] INFLUENZA VACCINE INACTIVATED (IIV), SUBUNIT, ADJUVANTED, IM [id number] CPT(R)] Follow-up Instructions Return in about 4 weeks (around 10/23/2018), or if symptoms worsen or fail to improve. Patient Instructions Vaccine Information Statement Influenza (Flu) Vaccine (Inactivated or Recombinant): What you need to know Many Vaccine Information Statements are available in Nepali and other languages. See www.immunize.org/vis Hojas de Información Sobre Vacunas están disponibles en Español y en muchos otros idiomas. Visite www.immunize.org/vis 1. Why get vaccinated? Influenza (flu) is a contagious disease that spreads around the [facility] every year, usually between October and May. Flu is caused by influenza viruses, and is spread mainly by coughing, sneezing, and close contact. Anyone can get flu. Flu strikes suddenly and can last several days. Symptoms vary by age, but can include: 
 fever/chills  sore throat  muscle aches  fatigue  cough  headache  runny or stuffy nose Flu can also lead to pneumonia and blood infections, and cause diarrhea and seizures in children. If you have a medical condition, such as heart or lung disease, flu can make it worse. Flu is more dangerous for some people. Infants and young children, people 72years of age and older, pregnant women, and people with certain health conditions or a weakened immune system are at greatest risk. Each year thousands of people in the [facility] die from flu, and many more are hospitalized. Flu vaccine can: 
 keep you from getting flu, 
 make flu less severe if you do get it, and 
 keep you from spreading flu to your family and other people. 2. Inactivated and recombinant flu vaccines A dose of flu vaccine is recommended every flu season. Children 6 months through 6years of age may need two doses during the same flu season. Everyone else needs only one dose each flu season. Some inactivated flu vaccines contain a very small amount of a mercury-based preservative called thimerosal. Studies have not shown thimerosal in vaccines to be harmful, but flu vaccines that do not contain thimerosal are available. There is no live flu virus in flu shots. They cannot cause the flu. There are many flu viruses, and they are always changing. Each year a new flu vaccine is made to protect against three or four viruses that are likely to cause disease in the upcoming flu season. But even when the vaccine doesnt exactly match these viruses, it may still provide some protection Flu vaccine cannot prevent: 
 flu that is caused by a virus not covered by the vaccine, or 
 illnesses that look like flu but are not. It takes about 2 weeks for protection to develop after vaccination, and protection lasts through the flu season. 3. Some people should not get this vaccine Tell the person who is giving you the vaccine:  If you have any severe, life-threatening allergies. If you ever had a life-threatening allergic reaction after a dose of flu vaccine, or have a severe allergy to any part of this vaccine, you may be advised not to get vaccinated. Most, but not all, types of flu vaccine contain a small amount of egg protein.  If you ever had Guillain-Barré Syndrome (also called GBS). Some people with a history of GBS should not get this vaccine. This should be discussed with your doctor.  If you are not feeling well. It is usually okay to get flu vaccine when you have a mild illness, but you might be asked to come back when you feel better. 4. Risks of a vaccine reaction With any medicine, including vaccines, there is a chance of reactions. These are usually mild and go away on their own, but serious reactions are also possible. Most people who get a flu shot do not have any problems with it. Minor problems following a flu shot include:  
 soreness, redness, or swelling where the shot was given  hoarseness  sore, red or itchy eyes  cough  fever  aches  headache  itching  fatigue If these problems occur, they usually begin soon after the shot and last 1 or 2 days. More serious problems following a flu shot can include the following:  There may be a small increased risk of Guillain-Barré Syndrome (GBS) after inactivated flu vaccine. This risk has been estimated at 1 or 2 additional cases per million people vaccinated. This is much lower than the risk of severe complications from flu, which can be prevented by flu vaccine.  Young children who get the flu shot along with pneumococcal vaccine (PCV13) and/or DTaP vaccine at the same time might be slightly more likely to have a seizure caused by fever. Ask your doctor for more information. Tell your doctor if a child who is getting flu vaccine has ever had a seizure. Problems that could happen after any injected vaccine:  People sometimes faint after a medical procedure, including vaccination. Sitting or lying down for about 15 minutes can help prevent fainting, and injuries caused by a fall. Tell your doctor if you feel dizzy, or have vision changes or ringing in the ears.  Some people get severe pain in the shoulder and have difficulty moving the arm where a shot was given. This happens very rarely.  Any medication can cause a severe allergic reaction. Such reactions from a vaccine are very rare, estimated at about 1 in a million doses, and would happen within a few minutes to a few hours after the vaccination. As with any medicine, there is a very remote chance of a vaccine causing a serious injury or death. The safety of vaccines is always being monitored. For more information, visit: www.cdc.gov/vaccinesafety/ 
 
5. What if there is a serious reaction? What should I look for?  Look for anything that concerns you, such as signs of a severe allergic reaction, very high fever, or unusual behavior. Signs of a severe allergic reaction can include hives, swelling of the face and throat, difficulty breathing, a fast heartbeat, dizziness, and weakness  usually within a few minutes to a few hours after the vaccination. What should I do?  If you think it is a severe allergic reaction or other emergency that cant wait, call  and get the person to the nearest hospital. Otherwise, call your doctor.  Reactions should be reported to the Vaccine Adverse Event Reporting System (VAERS). Your doctor should file this report, or you can do it yourself through  the VAERS web site at www.vaers. [facility].gov, or by calling 6-321.154.8157. VAERS does not give medical advice. 6. The National Vaccine Injury Compensation Program 
 
The [facility] Vaccine Injury Compensation Program (VICP) is a federal program that was created to compensate people who may have been injured by certain vaccines. Persons who believe they may have been injured by a vaccine can learn about the program and about filing a claim by calling 2-444.629.2778 or visiting the [employer] [address] website at www.[facility].gov/vaccinecompensation. There is a time limit to file a claim for compensation. 7. How can I learn more?  Ask your healthcare provider. He or she can give you the vaccine package insert or suggest other sources of information.  Call your local or state health department.  Contact the Centers for Disease Control and Prevention (CDC): 
- Call 3-222.455.1291 (1-800-CDC-INFO) or 
- Visit CDCs website at www.cdc.gov/flu Vaccine Information Statement Inactivated Influenza Vaccine 2015 
42 ISAIAS Stoddard 432OV-77 Department of Health and [employer] Centers for Disease Control and Prevention Office Use Only Anxiety Disorder: Care Instructions Your Care Instructions Anxiety is a normal reaction to stress. Difficult situations can cause you to have symptoms such as sweaty palms and a nervous feeling. In an anxiety disorder, the symptoms are far more severe. Constant worry, muscle tension, trouble sleeping, nausea and diarrhea, and other symptoms can make normal daily activities difficult or impossible. These symptoms may occur for no reason, and they can affect your work, school, or social life. Medicines, counseling, and self-care can all help. Follow-up care is a key part of your treatment and safety. Be sure to make and go to all appointments, and call your doctor if you are having problems. It's also a good idea to know your test results and keep a list of the medicines you take. How can you care for yourself at home? · Take medicines exactly as directed. Call your doctor if you think you are having a problem with your medicine. · Go to your counseling sessions and follow-up appointments. · Recognize and accept your anxiety. Then, when you are in a situation that makes you anxious, say to yourself, \"This is not an emergency. I feel uncomfortable, but I am not in danger. I can keep going even if I feel anxious. \" · Be kind to your body: ¨ Relieve tension with exercise or a massage. ¨ Get enough rest. 
¨ Avoid alcohol, caffeine, nicotine, and illegal drugs. They can increase your anxiety level and cause sleep problems. ¨ Learn and do relaxation techniques. See below for more about these techniques. · Engage your mind. Get out and do something you enjoy. Go to a funny movie, or take a walk or hike. Plan your day. Having too much or too little to do can make you anxious. · Keep a record of your symptoms. Discuss your fears with a good friend or family member, or join a support group for people with similar problems. Talking to others sometimes relieves stress. · Get involved in social groups, or volunteer to help others.  Being alone sometimes makes things seem worse than they are. · Get at least 30 minutes of exercise on most days of the week to relieve stress. Walking is a good choice. You also may want to do other activities, such as running, swimming, cycling, or playing tennis or team sports. Relaxation techniques Do relaxation exercises 10 to 20 minutes a day. You can play soothing, relaxing music while you do them, if you wish. · Tell others in your house that you are going to do your relaxation exercises. Ask them not to disturb you. · Find a comfortable place, away from all distractions and noise. · Lie down on your back, or sit with your back straight. · Focus on your breathing. Make it slow and steady. · Breathe in through your nose. Breathe out through either your nose or mouth. · Breathe deeply, filling up the area between your navel and your rib cage. Breathe so that your belly goes up and down. · Do not hold your breath. · Breathe like this for 5 to 10 minutes. Notice the feeling of calmness throughout your whole body. As you continue to breathe slowly and deeply, relax by doing the following for another 5 to 10 minutes: · Tighten and relax each muscle group in your body. You can begin at your toes and work your way up to your head. · Imagine your muscle groups relaxing and becoming heavy. · Empty your mind of all thoughts. · Let yourself relax more and more deeply. · Become aware of the state of calmness that surrounds you. · When your relaxation time is over, you can bring yourself back to alertness by moving your fingers and toes and then your hands and feet and then stretching and moving your entire body. Sometimes people fall asleep during relaxation, but they usually wake up shortly afterward. · Always give yourself time to return to full alertness before you drive a car or do anything that might cause an accident if you are not fully alert. Never play a relaxation tape while you drive a car. When should you call for help? Call 911 anytime you think you may need emergency care. For example, call if: 
  · You feel you cannot stop from hurting yourself or someone else.  
Rosemarie Karla the numbers for these national suicide hotlines: 4-080-059-TALK (2-618.417.2752) and 4-056-HHIPWFK (4-577.593.1094). If you or someone you know talks about suicide or feeling hopeless, get help right away. 
 Watch closely for changes in your health, and be sure to contact your doctor if: 
  · You have anxiety or fear that affects your life.  
  · You have symptoms of anxiety that are new or different from those you had before. Where can you learn more? Go to http://ivette-nida.info/. Enter P754 in the search box to learn more about \"Anxiety Disorder: Care Instructions. \" Current as of: December 7, 2017 Content Version: 11.7 © 6904-8793 [employer]. Care instructions adapted under license by [employer] (which disclaims liability or warranty for this information). If you have questions about a medical condition or this instruction, always ask your healthcare professional. [place] [address] any warranty or liability for your use of this information. Recovering From Depression: Care Instructions Your Care Instructions Taking good care of yourself is important as you recover from depression. In time, your symptoms will fade as your treatment takes hold. Do not give up. Instead, focus your energy on getting better. Your mood will improve. It just takes some time. Focus on things that can help you feel better, such as being with friends and family, eating well, and getting enough rest. But take things slowly. Do not do too much too soon. You will begin to feel better gradually. Follow-up care is a key part of your treatment and safety.  Be sure to make and go to all appointments, and call your doctor if you are having problems. It's also a good idea to know your test results and keep a list of the medicines you take. How can you care for yourself at home? Be realistic · If you have a large task to do, break it up into smaller steps you can handle, and just do what you can. · You may want to put off important decisions until your depression has lifted. If you have plans that will have a major impact on your life, such as marriage, divorce, or a job change, try to wait a bit. Talk it over with friends and loved ones who can help you look at the overall picture first. 
· Reaching out to people for help is important. Do not isolate yourself. Let your family and friends help you. Find someone you can trust and confide in, and talk to that person. · Be patient, and be kind to yourself. Remember that depression is not your fault and is not something you can overcome with willpower alone. Treatment is necessary for depression, just like for any other illness. Feeling better takes time, and your mood will improve little by little. Stay active · Stay busy and get outside. Take a walk, or try some other light exercise. · Talk with your doctor about an exercise program. Exercise can help with mild depression. · Go to a movie or concert. Take part in a Cheondoism activity or other social gathering. Go to a ball game. · Ask a friend to have dinner with you. Take care of yourself · Eat a balanced diet with plenty of fresh fruits and vegetables, whole grains, and lean protein. If you have lost your appetite, eat small snacks rather than large meals. · Avoid drinking alcohol or using illegal drugs. Do not take medicines that have not been prescribed for you. They may interfere with medicines you may be taking for depression, or they may make your depression worse. · Take your medicines exactly as they are prescribed. You may start to feel better within 1 to 3 weeks of taking antidepressant medicine.  But it can take as many as 6 to 8 weeks to see more improvement. If you have questions or concerns about your medicines, or if you do not notice any improvement by 3 weeks, talk to your doctor. · If you have any side effects from your medicine, tell your doctor. Antidepressants can make you feel tired, dizzy, or nervous. Some people have dry mouth, constipation, headaches, sexual problems, or diarrhea. Many of these side effects are mild and will go away on their own after you have been taking the medicine for a few weeks. Some may last longer. Talk to your doctor if side effects are bothering you too much. You might be able to try a different medicine. · Get enough sleep. If you have problems sleeping: ¨ Go to bed at the same time every night, and get up at the same time every morning. ¨ Keep your bedroom dark and quiet. ¨ Do not exercise after 5:00 p.m. ¨ Avoid drinks with caffeine after 5:00 p.m. · Avoid sleeping pills unless they are prescribed by the doctor treating your depression. Sleeping pills may make you groggy during the day, and they may interact with other medicine you are taking. · If you have any other illnesses, such as diabetes, heart disease, or high blood pressure, make sure to continue with your treatment. Tell your doctor about all of the medicines you take, including those with or without a prescription. · Keep the numbers for these national suicide hotlines: 5-204-427-TALK (1-601.423.2135) and 3-297-QFNBJHH (6-886.151.8105). If you or someone you know talks about suicide or feeling hopeless, get help right away. When should you call for help? Call 911 anytime you think you may need emergency care. For example, call if: 
  · You feel like hurting yourself or someone else.  
  · Someone you know has depression and is about to attempt or is attempting suicide.  
[facility] your doctor now or seek immediate medical care if: 
  · You hear voices.  
  · Someone you know has depression and: ¨ Starts to give away his or her possessions. ¨ Uses illegal drugs or drinks alcohol heavily. ¨ Talks or writes about death, including writing suicide notes or talking about guns, knives, or pills. ¨ Starts to spend a lot of time alone. ¨ Acts very aggressively or suddenly appears calm.  
 Watch closely for changes in your health, and be sure to contact your doctor if: 
  · You do not get better as expected. Where can you learn more? Go to http://ivette-nida.info/. Enter V580 in the search box to learn more about \"Recovering From Depression: Care Instructions. \" Current as of: December 7, 2017 Content Version: 11.7 © 7102-2337 [employer]. Care instructions adapted under license by [employer] (which disclaims liability or warranty for this information). If you have questions about a medical condition or this instruction, always ask your healthcare professional. [place] [address] any warranty or liability for your use of this information. Introducing [facility]! [employer] introduces Financial Fairy Tales patient portal. Now you can access parts of your medical record, email your doctor's office, and request medication refills online. 1. In your internet browser, go to [email or web address] 2. Click on the First Time User? Click Here link in the Sign In box. You will see the New Member Sign Up page. 3. Enter your [employer] Access Code exactly as it appears below. You will not need to use this code after youve completed the sign-up process. If you do not sign up before the expiration date, you must request a new code. · [employer] Access Code: [email or web address] Expires: 12/24/2018 10:48 AM 
 
4. Enter the last four digits of your Social Security Number (xxxx) and Date of Birth (mm/dd/yyyy) as indicated and click Submit. You will be taken to the next sign-up page. 5. Create a Fliplife ID. This will be your Fliplife login ID and cannot be changed, so think of one that is secure and easy to remember. 6. Create a Fliplife password. You can change your password at any time. 7. Enter your Password Reset Question and Answer. This can be used at a later time if you forget your password. 8. Enter your e-mail address. You will receive e-mail notification when new information is available in [address] 9. Click Sign Up. You can now view and download portions of your medical record. 10. Click the Download Summary menu link to download a portable copy of your medical information. If you have questions, please visit the Frequently Asked Questions section of the Fliplife website. Remember, Fliplife is NOT to be used for urgent needs. For medical emergencies, dial 911. Now available from your iPhone and Android! Please provide this summary of care documentation to your next provider. Your primary care clinician is listed as Mark Mack. If you have any questions after today's visit, please call [phone].

## 2018-09-25 NOTE — PROGRESS NOTES
Subjective:     Chief Complaint   Patient presents with    Sad     mood swings, tearful, hot flashes        HPI:  Gardner Soulier is a 81 y.o. female here for complaints of moodiness, tearful, hot flashes since beginning of the year. She thinks that she is \"going through the change of life\". She had complete hysterectomy about 4 years ago but had not had menses for several years prior to hysterectomy, \"I stopped getting my period in my 40's\". She says that she worries about everything. She lives alone [place] I hate it. I have hated living alone since my  passed away 4 years ago\". Says that she stays nervous and upset all the time and the \"littllist things make me cry and I did not use to be like that\". \"Went to a counselor in [place] and she tried me on 4 or 5 different pills for these feelings and they all made me sick and I couldn't take them\". She stopped going to to counselor in March. Last EMR lists Lexapro, but she does not recall what side effects she had on the medicine. She says that she tends to have some hot flashes about 2-3 times per week, during the day. Lasting only a few seconds. She says she wants to get out and do things but \"I dont have anyone to do things with\". Says that her best friend moved to [place] and her sisters are either sick or busy doing other things. She does go to [employer] and enjoys Buddhist, says that her [employer] used to have a women's group but they stopped. Upon chart review for medication dispenses via her pharmacy, it appears that she has been on Wellbutrin, Celexa, Lexapro, fluoxetine in the past 2 years. It appears that she may have started at mid to higher range doses of some of these medicines which may have contributed to her side effects. No hospital, ER or specialist visits since last primary care visit except as noted above.     Past Medical History:   Diagnosis Date    Back pain     CAD (coronary artery disease)     CABG, [place]    Dry eye     Hypercholesterolemia     Hypertension        Social History   Substance Use Topics    Smoking status: Never Smoker    Smokeless tobacco: Never Used    Alcohol use Yes      Comment: [facility]       Outpatient Prescriptions Marked as Taking for the 9/25/18 encounter (Office Visit) with Cecily Swann NP   Medication Sig Dispense Refill    calcium carbonate (CALTREX) 600 mg calcium (1,500 mg) tablet Take 600 mg by mouth two (2) times a day.  fish oil-omega-3 fatty acids (FISH OIL) 340-1,000 mg capsule Take 1 Cap by mouth two (2) times a day.  lisinopril (PRINIVIL, ZESTRIL) 10 mg tablet Take 1 Tab by mouth daily. 90 Tab 3    metoprolol succinate (TOPROL-XL) 50 mg XL tablet TAKE 1 TABLET BY MOUTH EVERY DAY 90 Tab 3    pravastatin (PRAVACHOL) 80 mg tablet Take 1 Tab by mouth nightly. 90 Tab 3    cholecalciferol, vitamin d3, (VITAMIN D) 1,000 unit tablet Take 1,000 Units by mouth daily. Allergies   Allergen Reactions    Amoxicillin Rash    Amoxicillin Rash    Sulfa (Sulfonamide Antibiotics) Nausea Only       Health Maintenance reviewed       ROS:  Gen: no fatigue, no fever, no chills, no unexplained weight loss or weight gain  Eyes: no excessive tearing, itching, or discharge  Nose: no rhinorrhea, no sinus pain  Mouth: no oral lesions, no sore throat, no difficulty swallowing  Resp: no shortness of breath, no wheezing, no cough  CV: no chest pain, no orthopnea, no paroxysmal nocturnal dyspnea, no lower extremity edema, no palpitations  Abd: no nausea, no heartburn, no diarrhea, no constipation, no abdominal pain  Neuro: no headaches, no syncope or presyncopal episodes  Endo: no polyuria, no polydipsia.     : no hematuria, no dysuria, no frequency, no incontinence  Heme: no lymphadenopathy, no easy bruising or bleeding, no night sweats  MSK: no joint pain or swelling    PE:  Visit Vitals    /67 (BP 1 Location: Right arm, BP Patient Position: Sitting)    Pulse (!) 49    Temp 98 °F (36.7 °C) (Oral)    Resp 16    Ht 5' 5\" (1.651 m)    Wt 138 lb 12.8 oz (63 kg)    SpO2 98%    BMI 23.1 kg/m2     Gen: alert, oriented, no acute distress  Head: normocephalic, atraumatic  Ears: external auditory canals clear, TMs without erythema or effusion  Eyes: pupils equal round reactive to light, sclera clear, conjunctiva clear  Oral: moist mucus membranes, no oral lesions, no pharyngeal inflammation or exudate  Neck: symmetric normal sized thyroid, no carotid bruits, no jugular vein distention  Resp: no increase work of breathing, lungs clear to ausculation bilaterally, no wheezing, rales or rhonchi  CV: S1, S2 normal.  No murmurs, rubs, or gallops. Abd: soft, not tender, not distended. No hepatosplenomegaly. Normal bowel sounds. No hernias. No abdominal or renal bruits. Neuro: cranial nerves intact, normal strength and movement in all extremities, reflexes and sensation intact and symmetric. Skin: no lesion or rash  Extremities: no cyanosis or edema      Assessment/Plan:  Differential diagnosis and treatment options reviewed with patient who is in agreement with treatment plan as outlined below. ICD-10-CM ICD-9-CM    1. Anxiety and depression F41.9 300.00 PARoxetine (PAXIL) 10 mg tablet    F32.9 311    2. Encounter for immunization Z23 V03.89 INFLUENZA VACCINE INACTIVATED (IIV), SUBUNIT, ADJUVANTED, IM      ADMIN INFLUENZA VIRUS VAC   3. Mood swings (HCC) F39 296.99    4. Hot flashes R23.2 782.62    5. Essential hypertension I10 401.9      Discussed expected benefits vs possible side effects of SSRIs. Explained maximal effect may not be noted for 6 weeks. Discussed possibility of increased suicidal tendencies during onset of action. Patient contracts for safety and can identify an accessible social support network. Patient underdstands that medication is more effective when partnered with counseling. Follow up appointment scheduled for 1 month.   Recommend counseling as well, contact info given for [facility]. I encouraged her to try to join more social groups, maybe she could start/lead the women's group at her [employer]. Discussed BMI and healthy weight. Encouraged patient to work to implement changes including diet high in raw fruits and vegetables, lean protein and good fats. Limit refined, processed carbohydrates and sugar. Encouraged regular exercise. Recommended regular cardiovascular exercise 3-6 times per week for 30-60 minutes daily. Flu shot today. VIS discussed and copy given in AVS.   Will check fasting routine labs at follow up. I have discussed the diagnosis with the patient and the intended plan as seen in the above orders. The patient has received an after-visit summary and questions were answered concerning future plans. I have discussed medication side effects and warnings with the patient as well. The patient verbalizes understanding and agreement with the plan.

## 2018-09-25 NOTE — PROGRESS NOTES
Chief Complaint   Patient presents with    Sad     mood swings, tearful, hot flashes     1. Have you been to the ER, urgent care clinic since your last visit? Hospitalized since your last visit? No    2. Have you seen or consulted any other health care providers outside of the [address] [place] [address] since your last visit? Include any pap smears or colon screening.  No

## 2018-10-24 ENCOUNTER — OFFICE VISIT (OUTPATIENT)
Dept: FAMILY MEDICINE CLINIC | Age: 73
End: 2018-10-24

## 2018-10-24 VITALS
SYSTOLIC BLOOD PRESSURE: 125 MMHG | DIASTOLIC BLOOD PRESSURE: 74 MMHG | HEART RATE: 54 BPM | OXYGEN SATURATION: 98 % | BODY MASS INDEX: 23.13 KG/M2 | RESPIRATION RATE: 16 BRPM | WEIGHT: 138.8 LBS | HEIGHT: 65 IN | TEMPERATURE: 97.8 F

## 2018-10-24 DIAGNOSIS — Z00.00 LABORATORY EXAM ORDERED AS PART OF ROUTINE GENERAL MEDICAL EXAMINATION: ICD-10-CM

## 2018-10-24 DIAGNOSIS — E55.9 VITAMIN D INSUFFICIENCY: ICD-10-CM

## 2018-10-24 DIAGNOSIS — F32.A ANXIETY AND DEPRESSION: Primary | ICD-10-CM

## 2018-10-24 DIAGNOSIS — R23.2 HOT FLASHES: ICD-10-CM

## 2018-10-24 DIAGNOSIS — Z13.29 SCREENING FOR THYROID DISORDER: ICD-10-CM

## 2018-10-24 DIAGNOSIS — Z13.220 SCREENING, LIPID: ICD-10-CM

## 2018-10-24 DIAGNOSIS — I10 ESSENTIAL HYPERTENSION: ICD-10-CM

## 2018-10-24 DIAGNOSIS — F41.9 ANXIETY AND DEPRESSION: Primary | ICD-10-CM

## 2018-10-24 RX ORDER — PAROXETINE 10 MG/1
10 TABLET, FILM COATED ORAL DAILY
Qty: 90 TAB | Refills: 3 | Status: SHIPPED | OUTPATIENT
Start: 2018-10-24 | End: 2018-11-28 | Stop reason: SDUPTHER

## 2018-10-24 NOTE — PROGRESS NOTES
Subjective:     Chief Complaint   Patient presents with    Medication Evaluation     Paxil        HPI:  Philip Keene is a 81 y.o. female presents for follow up appointment; started Paxil at last appointment about a month ago. She says that she feels that the medicine is working well. No nausea. She feels that she is not crying much at all anymore. She feels that her nervousness has improved as well. Sleeping ok and hot flashes have improved. She has not started any counseling yet. Says that she has had some increase \"eye pain\" over the past week, intermittently comes and goes. Describes as quick and sharp and goes away quickly. Says she has had issues with her eyes for over a year. Was diagnosed with dry eye and using artificial tears and lubricant (Rephresh and Systane)  6 times per day but now she has cut back to 3 times per day because she could not keep up with the 6 times per day. She Followed by Dr Bhavesh Cárdenas in [facility] hospital, ER or specialist visits since last primary care visit except as noted above. Past Medical History:   Diagnosis Date    Back pain     CAD (coronary artery disease)     CABG, [place]    Dry eye     Hypercholesterolemia     Hypertension        Social History     Tobacco Use    Smoking status: Never Smoker    Smokeless tobacco: Never Used   Substance Use Topics    Alcohol use: Yes     Comment: occ    Drug use: No       Outpatient Medications Marked as Taking for the 10/24/18 encounter (Office Visit) with Shira Rouse NP   Medication Sig Dispense Refill    calcium carbonate (CALTREX) 600 mg calcium (1,500 mg) tablet Take 600 mg by mouth two (2) times a day.  PARoxetine (PAXIL) 10 mg tablet Take 1 Tab by mouth daily. 30 Tab 0    fish oil-omega-3 fatty acids (FISH OIL) 340-1,000 mg capsule Take 1 Cap by mouth two (2) times a day.  lisinopril (PRINIVIL, ZESTRIL) 10 mg tablet Take 1 Tab by mouth daily.  90 Tab 3    metoprolol succinate (TOPROL-XL) 50 mg XL tablet TAKE 1 TABLET BY MOUTH EVERY DAY 90 Tab 3    pravastatin (PRAVACHOL) 80 mg tablet Take 1 Tab by mouth nightly. 90 Tab 3    aspirin (SINDY CHILDRENS ASPIRIN) 81 mg chewable tablet Take 81 mg by mouth daily.  cholecalciferol, vitamin d3, (VITAMIN D) 1,000 unit tablet Take 1,000 Units by mouth daily. Allergies   Allergen Reactions    Amoxicillin Rash    Amoxicillin Rash    Sulfa (Sulfonamide Antibiotics) Nausea Only       Health Maintenance reviewed       ROS:  Gen: no fatigue, no fever, no chills, no unexplained weight loss or weight gain  Eyes: no excessive tearing, itching, or discharge  Nose: no rhinorrhea, no sinus pain  Mouth: no oral lesions, no sore throat, no difficulty swallowing  Resp: no shortness of breath, no wheezing, no cough  CV: no chest pain, no orthopnea, no paroxysmal nocturnal dyspnea, no lower extremity edema, no palpitations  Abd: no nausea, no heartburn, no diarrhea, no constipation, no abdominal pain  Neuro: no headaches, no syncope or presyncopal episodes  Endo: no polyuria, no polydipsia.     : no hematuria, no dysuria, no frequency, no incontinence  Heme: no lymphadenopathy, no easy bruising or bleeding, no night sweats  MSK: no joint pain or swelling    PE:  Visit Vitals  /74 (BP 1 Location: Left arm, BP Patient Position: Sitting)   Pulse (!) 54   Temp 97.8 °F (36.6 °C) (Oral)   Resp 16   Ht 5' 5\" (1.651 m)   Wt 138 lb 12.8 oz (63 kg)   SpO2 98%   BMI 23.10 kg/m²     Gen: alert, oriented, no acute distress  Head: normocephalic, atraumatic  Ears: external auditory canals clear, TMs without erythema or effusion  Eyes: pupils equal round reactive to light, sclera clear, conjunctiva clear  Oral: moist mucus membranes, no oral lesions, no pharyngeal inflammation or exudate  Neck: symmetric normal sized thyroid, no carotid bruits, no jugular vein distention  Resp: no increase work of breathing, lungs clear to ausculation bilaterally, no wheezing, rales or rhonchi  CV: S1, S2 normal.  No murmurs, rubs, or gallops. Abd: soft, not tender, not distended. No hepatosplenomegaly. Normal bowel sounds. No hernias. No abdominal or renal bruits. Neuro: cranial nerves intact, normal strength and movement in all extremities, reflexes and sensation intact and symmetric. Skin: no lesion or rash  Extremities: no cyanosis or edema    No results found for this visit on 10/24/18. Assessment/Plan:  Differential diagnosis and treatment options reviewed with patient who is in agreement with treatment plan as outlined below. ICD-10-CM ICD-9-CM    1. Anxiety and depression F41.9 300.00 PARoxetine (PAXIL) 10 mg tablet    F32.9 311    2. Screening, lipid Z13.220 V77.91 LIPID PANEL   3. Essential hypertension N05 937.2 METABOLIC PANEL, COMPREHENSIVE   4. Hot flashes R23.2 782.62    5. Screening for thyroid disorder Z13.29 V77.0 TSH 3RD GENERATION   6. Laboratory exam ordered as part of routine general medical examination I14.69 W78.86 METABOLIC PANEL, COMPREHENSIVE      CBC WITH AUTOMATED DIFF      LIPID PANEL      TSH 3RD GENERATION   7. Vitamin D insufficiency E55.9 268.9 VITAMIN D, 25 HYDROXY     Doing well on Paxil, will continue current dose. Depression and Anxiety and hot flashes controlled. She will inquire about counseling as well. BP at goal. No change in therapy. Not fasting today. Will return in February for her Medicare Wellness and follow up if she continues to do well. Discussed BMI and healthy weight. Encouraged patient to work to implement changes including diet high in raw fruits and vegetables, lean protein and good fats. Limit refined, processed carbohydrates and sugar. Encouraged regular exercise. Recommended regular cardiovascular exercise 3-6 times per week for 30-60 minutes daily. Follow up with eye MD regarding dry eye. May be increased secondary to use of heat lately with cold weather change.   She will use her drops more often as well. Increase water hydration. I have discussed the diagnosis with the patient and the intended plan as seen in the above orders. The patient has received an after-visit summary and questions were answered concerning future plans. I have discussed medication side effects and warnings with the patient as well. The patient verbalizes understanding and agreement with the plan.

## 2018-10-24 NOTE — PATIENT INSTRUCTIONS
Anxiety Disorder: Care Instructions  Your Care Instructions    Anxiety is a normal reaction to stress. Difficult situations can cause you to have symptoms such as sweaty palms and a nervous feeling. In an anxiety disorder, the symptoms are far more severe. Constant worry, muscle tension, trouble sleeping, nausea and diarrhea, and other symptoms can make normal daily activities difficult or impossible. These symptoms may occur for no reason, and they can affect your work, school, or social life. Medicines, counseling, and self-care can all help. Follow-up care is a key part of your treatment and safety. Be sure to make and go to all appointments, and call your doctor if you are having problems. It's also a good idea to know your test results and keep a list of the medicines you take. How can you care for yourself at home? · Take medicines exactly as directed. Call your doctor if you think you are having a problem with your medicine. · Go to your counseling sessions and follow-up appointments. · Recognize and accept your anxiety. Then, when you are in a situation that makes you anxious, say to yourself, \"This is not an emergency. I feel uncomfortable, but I am not in danger. I can keep going even if I feel anxious. \"  · Be kind to your body:  ? Relieve tension with exercise or a massage. ? Get enough rest.  ? Avoid alcohol, caffeine, nicotine, and illegal drugs. They can increase your anxiety level and cause sleep problems. ? Learn and do relaxation techniques. See below for more about these techniques. · Engage your mind. Get out and do something you enjoy. Go to a funny movie, or take a walk or hike. Plan your day. Having too much or too little to do can make you anxious. · Keep a record of your symptoms. Discuss your fears with a good friend or family member, or join a support group for people with similar problems. Talking to others sometimes relieves stress.   · Get involved in social groups, or volunteer to help others. Being alone sometimes makes things seem worse than they are. · Get at least 30 minutes of exercise on most days of the week to relieve stress. Walking is a good choice. You also may want to do other activities, such as running, swimming, cycling, or playing tennis or team sports. Relaxation techniques  Do relaxation exercises 10 to 20 minutes a day. You can play soothing, relaxing music while you do them, if you wish. · Tell others in your house that you are going to do your relaxation exercises. Ask them not to disturb you. · Find a comfortable place, away from all distractions and noise. · Lie down on your back, or sit with your back straight. · Focus on your breathing. Make it slow and steady. · Breathe in through your nose. Breathe out through either your nose or mouth. · Breathe deeply, filling up the area between your navel and your rib cage. Breathe so that your belly goes up and down. · Do not hold your breath. · Breathe like this for 5 to 10 minutes. Notice the feeling of calmness throughout your whole body. As you continue to breathe slowly and deeply, relax by doing the following for another 5 to 10 minutes:  · Tighten and relax each muscle group in your body. You can begin at your toes and work your way up to your head. · Imagine your muscle groups relaxing and becoming heavy. · Empty your mind of all thoughts. · Let yourself relax more and more deeply. · Become aware of the state of calmness that surrounds you. · When your relaxation time is over, you can bring yourself back to alertness by moving your fingers and toes and then your hands and feet and then stretching and moving your entire body. Sometimes people fall asleep during relaxation, but they usually wake up shortly afterward. · Always give yourself time to return to full alertness before you drive a car or do anything that might cause an accident if you are not fully alert.  Never play a relaxation tape while you drive a car. When should you call for help? Call 911 anytime you think you may need emergency care. For example, call if:    · You feel you cannot stop from hurting yourself or someone else.   Maryellen Amaya the numbers for these national suicide hotlines: 5-939-705-TALK (1-393.310.6668) and 9-812-ZJMCSAY (8-721.728.1367). If you or someone you know talks about suicide or feeling hopeless, get help right away.   Watch closely for changes in your health, and be sure to contact your doctor if:    · You have anxiety or fear that affects your life.     · You have symptoms of anxiety that are new or different from those you had before. Where can you learn more? Go to http://ivette-nida.info/. Enter P754 in the search box to learn more about \"Anxiety Disorder: Care Instructions. \"  Current as of: December 7, 2017  Content Version: 11.8  © 8615-3143 [employer]. Care instructions adapted under license by [employer] (which disclaims liability or warranty for this information). If you have questions about a medical condition or this instruction, always ask your healthcare professional. [address] any warranty or liability for your use of this information. Recovering From Depression: Care Instructions  Your Care Instructions    Taking good care of yourself is important as you recover from depression. In time, your symptoms will fade as your treatment takes hold. Do not give up. Instead, focus your energy on getting better. Your mood will improve. It just takes some time. Focus on things that can help you feel better, such as being with friends and family, eating well, and getting enough rest. But take things slowly. Do not do too much too soon. You will begin to feel better gradually. Follow-up care is a key part of your treatment and safety. Be sure to make and go to all appointments, and call your doctor if you are having problems. It's also a good idea to know your test results and keep a list of the medicines you take. How can you care for yourself at home? Be realistic  · If you have a large task to do, break it up into smaller steps you can handle, and just do what you can. · You may want to put off important decisions until your depression has lifted. If you have plans that will have a major impact on your life, such as marriage, divorce, or a job change, try to wait a bit. Talk it over with friends and loved ones who can help you look at the overall picture first.  · Reaching out to people for help is important. Do not isolate yourself. Let your family and friends help you. Find someone you can trust and confide in, and talk to that person. · Be patient, and be kind to yourself. Remember that depression is not your fault and is not something you can overcome with willpower alone. Treatment is necessary for depression, just like for any other illness. Feeling better takes time, and your mood will improve little by little. Stay active  · Stay busy and get outside. Take a walk, or try some other light exercise. · Talk with your doctor about an exercise program. Exercise can help with mild depression. · Go to a movie or concert. Take part in a Religious activity or other social gathering. Go to a ball game. · Ask a friend to have dinner with you. Take care of yourself  · Eat a balanced diet with plenty of fresh fruits and vegetables, whole grains, and lean protein. If you have lost your appetite, eat small snacks rather than large meals. · Avoid drinking alcohol or using illegal drugs. Do not take medicines that have not been prescribed for you. They may interfere with medicines you may be taking for depression, or they may make your depression worse. · Take your medicines exactly as they are prescribed. You may start to feel better within 1 to 3 weeks of taking antidepressant medicine.  But it can take as many as 6 to 8 weeks to see more improvement. If you have questions or concerns about your medicines, or if you do not notice any improvement by 3 weeks, talk to your doctor. · If you have any side effects from your medicine, tell your doctor. Antidepressants can make you feel tired, dizzy, or nervous. Some people have dry mouth, constipation, headaches, sexual problems, or diarrhea. Many of these side effects are mild and will go away on their own after you have been taking the medicine for a few weeks. Some may last longer. Talk to your doctor if side effects are bothering you too much. You might be able to try a different medicine. · Get enough sleep. If you have problems sleeping:  ? Go to bed at the same time every night, and get up at the same time every morning. ? Keep your bedroom dark and quiet. ? Do not exercise after 5:00 p.m.  ? Avoid drinks with caffeine after 5:00 p.m. · Avoid sleeping pills unless they are prescribed by the doctor treating your depression. Sleeping pills may make you groggy during the day, and they may interact with other medicine you are taking. · If you have any other illnesses, such as diabetes, heart disease, or high blood pressure, make sure to continue with your treatment. Tell your doctor about all of the medicines you take, including those with or without a prescription. · Keep the numbers for these national suicide hotlines: 2-975-978-TALK (8-077-569-7501) and 2-296-RYHNQIP (4-159.994.2736). If you or someone you know talks about suicide or feeling hopeless, get help right away. When should you call for help? Call 911 anytime you think you may need emergency care.  For example, call if:    · You feel like hurting yourself or someone else.     · Someone you know has depression and is about to attempt or is attempting suicide.    Call your doctor now or seek immediate medical care if:    · You hear voices.     · Someone you know has depression and:  ? Starts to give away his or her possessions. ? Uses illegal drugs or drinks alcohol heavily. ? Talks or writes about death, including writing suicide notes or talking about guns, knives, or pills. ? Starts to spend a lot of time alone. ? Acts very aggressively or suddenly appears calm.    Watch closely for changes in your health, and be sure to contact your doctor if:    · You do not get better as expected. Where can you learn more? Go to http://ivette-nida.info/. Enter H492 in the search box to learn more about \"Recovering From Depression: Care Instructions. \"  Current as of: December 7, 2017  Content Version: 11.8  © 4299-8582 [employer]. Care instructions adapted under license by [employer] (which disclaims liability or warranty for this information). If you have questions about a medical condition or this instruction, always ask your healthcare professional. [address] any warranty or liability for your use of this information. DASH Diet: Care Instructions  Your Care Instructions    The DASH diet is an eating plan that can help lower your blood pressure. DASH stands for Dietary Approaches to Stop Hypertension. Hypertension is high blood pressure. The DASH diet focuses on eating foods that are high in calcium, potassium, and magnesium. These nutrients can lower blood pressure. The foods that are highest in these nutrients are fruits, vegetables, low-fat dairy products, nuts, seeds, and legumes. But taking calcium, potassium, and magnesium supplements instead of eating foods that are high in those nutrients does not have the same effect. The DASH diet also includes whole grains, fish, and poultry. The DASH diet is one of several lifestyle changes your doctor may recommend to lower your high blood pressure. Your doctor may also want you to decrease the amount of sodium in your diet.  Lowering sodium while following the DASH diet can lower blood pressure even further than just the DASH diet alone. Follow-up care is a key part of your treatment and safety. Be sure to make and go to all appointments, and call your doctor if you are having problems. It's also a good idea to know your test results and keep a list of the medicines you take. How can you care for yourself at home? Following the DASH diet  · Eat 4 to 5 servings of fruit each day. A serving is 1 medium-sized piece of fruit, ½ cup chopped or canned fruit, 1/4 cup dried fruit, or 4 ounces (½ cup) of fruit juice. Choose fruit more often than fruit juice. · Eat 4 to 5 servings of vegetables each day. A serving is 1 cup of lettuce or raw leafy vegetables, ½ cup of chopped or cooked vegetables, or 4 ounces (½ cup) of vegetable juice. Choose vegetables more often than vegetable juice. · Get 2 to 3 servings of low-fat and fat-free dairy each day. A serving is 8 ounces of milk, 1 cup of yogurt, or 1 ½ ounces of cheese. · Eat 6 to 8 servings of grains each day. A serving is 1 slice of bread, 1 ounce of dry cereal, or ½ cup of cooked rice, pasta, or cooked cereal. Try to choose whole-grain products as much as possible. · Limit lean meat, poultry, and fish to 2 servings each day. A serving is 3 ounces, about the size of a deck of cards. · Eat 4 to 5 servings of nuts, seeds, and legumes (cooked dried beans, lentils, and split peas) each week. A serving is 1/3 cup of nuts, 2 tablespoons of seeds, or ½ cup of cooked beans or peas. · Limit fats and oils to 2 to 3 servings each day. A serving is 1 teaspoon of vegetable oil or 2 tablespoons of salad dressing. · Limit sweets and added sugars to 5 servings or less a week. A serving is 1 tablespoon jelly or jam, ½ cup sorbet, or 1 cup of lemonade. · Eat less than 2,300 milligrams (mg) of sodium a day. If you limit your sodium to 1,500 mg a day, you can lower your blood pressure even more. Tips for success  · Start small.  Do not try to make dramatic changes to your diet all at once. You might feel that you are missing out on your favorite foods and then be more likely to not follow the plan. Make small changes, and stick with them. Once those changes become habit, add a few more changes. · Try some of the following:  ? Make it a goal to eat a fruit or vegetable at every meal and at snacks. This will make it easy to get the recommended amount of fruits and vegetables each day. ? Try yogurt topped with fruit and nuts for a snack or healthy dessert. ? Add lettuce, tomato, cucumber, and onion to sandwiches. ? Combine a ready-made pizza crust with low-fat mozzarella cheese and lots of vegetable toppings. Try using tomatoes, squash, spinach, broccoli, carrots, cauliflower, and onions. ? Have a variety of cut-up vegetables with a low-fat dip as an appetizer instead of chips and dip. ? Sprinkle sunflower seeds or chopped almonds over salads. Or try adding chopped walnuts or almonds to cooked vegetables. ? Try some vegetarian meals using beans and peas. Add garbanzo or kidney beans to salads. Make burritos and tacos with mashed banks beans or black beans. Where can you learn more? Go to http://ivette-nida.info/. Enter E493 in the search box to learn more about \"DASH Diet: Care Instructions. \"  Current as of: December 6, 2017  Content Version: 11.8  © 5735-2037 [employer]. Care instructions adapted under license by [employer] (which disclaims liability or warranty for this information). If you have questions about a medical condition or this instruction, always ask your healthcare professional. [place] [address] any warranty or liability for your use of this information. Dry Eyes: Care Instructions  Your Care Instructions    Dry eyes can be uncomfortable. The dryness may make your eyes feel dry or hot. Your eyes may also water a lot.  In some cases, dry eyes make it feel like there is sand or dirt in your eyes.  From time to time, dry eyes may cause you to have blurry vision. But dry eyes don't usually cause lasting problems with vision. There are many causes of dry eyes. Sometimes dry weather, smoke, or pollution can bother the eyes. Other times, allergies or contact lenses irritate the eyes. Older people often have dry eyes because our eyes do not make as many tears as we age. In some cases, diseases can cause dry eyes. These include rheumatoid arthritis, lupus, and Sjögren's syndrome. In other cases, medicines are to blame. Your doctor may want to do tests to help find the cause of your dry eyes. You can work with your doctor to find ways to help your eyes feel better. Home treatment often helps. Follow-up care is a key part of your treatment and safety. Be sure to make and go to all appointments, and call your doctor if you are having problems. It's also a good idea to know your test results and keep a list of the medicines you take. How can you care for yourself at home? · Take breaks often when you read, watch TV, or use a computer. Close your eyes. Do not rub your eyes. Artificial tears may help you when you do these activities. You can buy these without a prescription. · Avoid smoke and other things that irritate the eyes. · Wear sunglasses that wrap around the sides of the head. These can protect the eyes from sun, wind, dust, and dirt. · Use a vaporizer or humidifier to add moisture to your bedroom. Follow the directions for cleaning the machine. · Do not use fans while you sleep. · If you usually wear contact lenses, use rewetting drops or wear your glasses until your eyes feel better. · Be safe with medicines. Take your medicine exactly as prescribed. Call your doctor if you think you are having a problem with your medicine. · Try using artificial tears at least 4 times a day. · If you need drops more than 4 times a day, use artificial tears without preservatives.  They may irritate the eyes less.  · Use a lubricating eye ointment or eye gel at bedtime. These are thicker and last longer, so you may have less burning, dryness, and itching when you wake up. Be aware that they may blur your vision for a short time. · To put in eyedrops or ointment:  ? Tilt your head back, and pull the lower eyelid down with one finger. ? Drop or squirt the medicine inside the lower lid. ? Close your eye for 30 to 60 seconds to let the drops or ointment move around. ? Do not touch the ointment or dropper tip to your eyelashes or any other surface. · Put a warm, moist cloth on your eyelids every morning for about 5 minutes. Then massage your eyelids lightly. This helps increase the natural wetness of your eyes. When should you call for help? Watch closely for changes in your health, and be sure to contact your doctor if:    · Your eyes are still dry, irritated, or teary, and artificial tears do not help.     · You do not get better as expected. Where can you learn more? Go to http://ivette-nida.info/. Enter D231 in the search box to learn more about \"Dry Eyes: Care Instructions. \"  Current as of: December 3, 2017  Content Version: 11.8  © 8016-9057 [employer]. Care instructions adapted under license by [employer] (which disclaims liability or warranty for this information). If you have questions about a medical condition or this instruction, always ask your healthcare professional. [place] [address] any warranty or liability for your use of this information.

## 2018-10-24 NOTE — PROGRESS NOTES
Chief Complaint   Patient presents with    Medication Evaluation     Paxil     1. Have you been to the ER, urgent care clinic since your last visit? Hospitalized since your last visit? No    2. Have you seen or consulted any other health care providers outside of the [address] [place] [address] since your last visit? Include any pap smears or colon screening.  No     PHQ over the last two weeks 10/24/2018   PHQ Not Done -   Little interest or pleasure in doing things Not at all   Feeling down, depressed, irritable, or hopeless Several days   Total Score PHQ 2 1

## 2018-11-08 ENCOUNTER — HOSPITAL ENCOUNTER (OUTPATIENT)
Dept: LAB | Age: 73
Discharge: HOME | End: 2018-11-08
Payer: MEDICARE

## 2018-11-08 DIAGNOSIS — E55.9 VITAMIN D INSUFFICIENCY: ICD-10-CM

## 2018-11-08 DIAGNOSIS — Z13.29 SCREENING FOR THYROID DISORDER: ICD-10-CM

## 2018-11-08 DIAGNOSIS — Z00.00 LABORATORY EXAM ORDERED AS PART OF ROUTINE GENERAL MEDICAL EXAMINATION: ICD-10-CM

## 2018-11-08 DIAGNOSIS — Z13.220 SCREENING, LIPID: ICD-10-CM

## 2018-11-08 DIAGNOSIS — I10 ESSENTIAL HYPERTENSION: ICD-10-CM

## 2018-11-08 PROCEDURE — 82306 VITAMIN D 25 HYDROXY: CPT

## 2018-11-08 PROCEDURE — 84443 ASSAY THYROID STIM HORMONE: CPT

## 2018-11-08 PROCEDURE — 80061 LIPID PANEL: CPT

## 2018-11-08 PROCEDURE — 80053 COMPREHEN METABOLIC PANEL: CPT

## 2018-11-08 PROCEDURE — 36415 COLL VENOUS BLD VENIPUNCTURE: CPT

## 2018-11-08 PROCEDURE — 85025 COMPLETE CBC W/AUTO DIFF WBC: CPT

## 2018-11-09 LAB
25(OH)D3+25(OH)D2 SERPL-MCNC: 54.6 NG/ML (ref 30–100)
ALBUMIN SERPL-MCNC: 4.4 G/DL (ref 3.5–4.8)
ALBUMIN/GLOB SERPL: 2 {RATIO} (ref 1.2–2.2)
ALP SERPL-CCNC: 84 IU/L (ref 39–117)
ALT SERPL-CCNC: 13 IU/L (ref 0–32)
AST SERPL-CCNC: 18 IU/L (ref 0–40)
BASOPHILS # BLD AUTO: 0 X10E3/UL (ref 0–0.2)
BASOPHILS NFR BLD AUTO: 1 %
BILIRUB SERPL-MCNC: 0.3 MG/DL (ref 0–1.2)
BUN SERPL-MCNC: 23 MG/DL (ref 8–27)
BUN/CREAT SERPL: 29 (ref 12–28)
CALCIUM SERPL-MCNC: 9.8 MG/DL (ref 8.7–10.3)
CHLORIDE SERPL-SCNC: 102 MMOL/L (ref 96–106)
CHOLEST SERPL-MCNC: 172 MG/DL (ref 100–199)
CO2 SERPL-SCNC: 26 MMOL/L (ref 20–29)
CREAT SERPL-MCNC: 0.79 MG/DL (ref 0.57–1)
EOSINOPHIL # BLD AUTO: 0.3 X10E3/UL (ref 0–0.4)
EOSINOPHIL NFR BLD AUTO: 4 %
ERYTHROCYTE [DISTWIDTH] IN BLOOD BY AUTOMATED COUNT: 13.1 % (ref 12.3–15.4)
GLOBULIN SER CALC-MCNC: 2.2 G/DL (ref 1.5–4.5)
GLUCOSE SERPL-MCNC: 85 MG/DL (ref 65–99)
HCT VFR BLD AUTO: 38.3 % (ref 34–46.6)
HDLC SERPL-MCNC: 90 MG/DL
HGB BLD-MCNC: 12.5 G/DL (ref 11.1–15.9)
IMM GRANULOCYTES # BLD: 0 X10E3/UL (ref 0–0.1)
IMM GRANULOCYTES NFR BLD: 0 %
INTERPRETATION, 910389: NORMAL
LDLC SERPL CALC-MCNC: 72 MG/DL (ref 0–99)
LYMPHOCYTES # BLD AUTO: 1.9 X10E3/UL (ref 0.7–3.1)
LYMPHOCYTES NFR BLD AUTO: 25 %
MCH RBC QN AUTO: 30.6 PG (ref 26.6–33)
MCHC RBC AUTO-ENTMCNC: 32.6 G/DL (ref 31.5–35.7)
MCV RBC AUTO: 94 FL (ref 79–97)
MONOCYTES # BLD AUTO: 0.5 X10E3/UL (ref 0.1–0.9)
MONOCYTES NFR BLD AUTO: 6 %
NEUTROPHILS # BLD AUTO: 4.9 X10E3/UL (ref 1.4–7)
NEUTROPHILS NFR BLD AUTO: 64 %
PLATELET # BLD AUTO: 269 X10E3/UL (ref 150–379)
POTASSIUM SERPL-SCNC: 4.8 MMOL/L (ref 3.5–5.2)
PROT SERPL-MCNC: 6.6 G/DL (ref 6–8.5)
RBC # BLD AUTO: 4.09 X10E6/UL (ref 3.77–5.28)
SODIUM SERPL-SCNC: 142 MMOL/L (ref 134–144)
TRIGL SERPL-MCNC: 49 MG/DL (ref 0–149)
TSH SERPL DL<=0.005 MIU/L-ACNC: 1.83 UIU/ML (ref 0.45–4.5)
VLDLC SERPL CALC-MCNC: 10 MG/DL (ref 5–40)
WBC # BLD AUTO: 7.6 X10E3/UL (ref 3.4–10.8)

## 2018-11-19 ENCOUNTER — HOSPITAL ENCOUNTER (EMERGENCY)
Age: 73
Discharge: HOME | End: 2018-11-20
Attending: EMERGENCY MEDICINE
Payer: MEDICARE

## 2018-11-19 ENCOUNTER — APPOINTMENT (OUTPATIENT)
Dept: GENERAL RADIOLOGY | Age: 73
End: 2018-11-19
Attending: EMERGENCY MEDICINE
Payer: MEDICARE

## 2018-11-19 ENCOUNTER — APPOINTMENT (OUTPATIENT)
Dept: CT IMAGING | Age: 73
End: 2018-11-19
Attending: EMERGENCY MEDICINE
Payer: MEDICARE

## 2018-11-19 DIAGNOSIS — R07.9 ACUTE CHEST PAIN: Primary | ICD-10-CM

## 2018-11-19 DIAGNOSIS — K27.9 PEPTIC ULCER DISEASE: ICD-10-CM

## 2018-11-19 DIAGNOSIS — R07.89 ATYPICAL CHEST PAIN: ICD-10-CM

## 2018-11-19 DIAGNOSIS — K21.9 GASTROESOPHAGEAL REFLUX DISEASE WITHOUT ESOPHAGITIS: ICD-10-CM

## 2018-11-19 DIAGNOSIS — K76.9 LIVER LESION: ICD-10-CM

## 2018-11-19 LAB
ALBUMIN SERPL-MCNC: 3.6 G/DL (ref 3.5–5)
ALBUMIN/GLOB SERPL: 1.1 {RATIO} (ref 1.1–2.2)
ALP SERPL-CCNC: 87 U/L (ref 45–117)
ALT SERPL-CCNC: 22 U/L (ref 12–78)
ANION GAP SERPL CALC-SCNC: 6 MMOL/L (ref 5–15)
AST SERPL-CCNC: 22 U/L (ref 15–37)
BASOPHILS # BLD: 0.1 K/UL (ref 0–0.1)
BASOPHILS NFR BLD: 1 % (ref 0–1)
BILIRUB SERPL-MCNC: 0.2 MG/DL (ref 0.2–1)
BUN SERPL-MCNC: 32 MG/DL (ref 6–20)
BUN/CREAT SERPL: 27 (ref 12–20)
CALCIUM SERPL-MCNC: 8.9 MG/DL (ref 8.5–10.1)
CHLORIDE SERPL-SCNC: 106 MMOL/L (ref 97–108)
CK SERPL-CCNC: 66 U/L (ref 26–192)
CO2 SERPL-SCNC: 28 MMOL/L (ref 21–32)
CREAT SERPL-MCNC: 1.18 MG/DL (ref 0.55–1.02)
DIFFERENTIAL METHOD BLD: ABNORMAL
EOSINOPHIL # BLD: 0.4 K/UL (ref 0–0.4)
EOSINOPHIL NFR BLD: 6 % (ref 0–7)
ERYTHROCYTE [DISTWIDTH] IN BLOOD BY AUTOMATED COUNT: 12.8 % (ref 11.5–14.5)
GLOBULIN SER CALC-MCNC: 3.2 G/DL (ref 2–4)
GLUCOSE SERPL-MCNC: 90 MG/DL (ref 65–100)
HCT VFR BLD AUTO: 34.8 % (ref 35–47)
HGB BLD-MCNC: 11.5 G/DL (ref 11.5–16)
IMM GRANULOCYTES # BLD: 0 K/UL (ref 0–0.04)
IMM GRANULOCYTES NFR BLD AUTO: 0 % (ref 0–0.5)
LIPASE SERPL-CCNC: 243 U/L (ref 73–393)
LYMPHOCYTES # BLD: 2.4 K/UL (ref 0.8–3.5)
LYMPHOCYTES NFR BLD: 36 % (ref 12–49)
MCH RBC QN AUTO: 30.6 PG (ref 26–34)
MCHC RBC AUTO-ENTMCNC: 33 G/DL (ref 30–36.5)
MCV RBC AUTO: 92.6 FL (ref 80–99)
MONOCYTES # BLD: 0.6 K/UL (ref 0–1)
MONOCYTES NFR BLD: 8 % (ref 5–13)
NEUTS SEG # BLD: 3.3 K/UL (ref 1.8–8)
NEUTS SEG NFR BLD: 49 % (ref 32–75)
NRBC # BLD: 0 K/UL (ref 0–0.01)
NRBC BLD-RTO: 0 PER 100 WBC
PLATELET # BLD AUTO: 210 K/UL (ref 150–400)
PMV BLD AUTO: 10.4 FL (ref 8.9–12.9)
POTASSIUM SERPL-SCNC: 4.1 MMOL/L (ref 3.5–5.1)
PROT SERPL-MCNC: 6.8 G/DL (ref 6.4–8.2)
RBC # BLD AUTO: 3.76 M/UL (ref 3.8–5.2)
SODIUM SERPL-SCNC: 140 MMOL/L (ref 136–145)
TROPONIN I SERPL-MCNC: <0.05 NG/ML
TROPONIN I SERPL-MCNC: <0.05 NG/ML
WBC # BLD AUTO: 6.8 K/UL (ref 3.6–11)

## 2018-11-19 PROCEDURE — 74011250637 HC RX REV CODE- 250/637: Performed by: EMERGENCY MEDICINE

## 2018-11-19 PROCEDURE — 80053 COMPREHEN METABOLIC PANEL: CPT

## 2018-11-19 PROCEDURE — 71275 CT ANGIOGRAPHY CHEST: CPT

## 2018-11-19 PROCEDURE — 74011636320 HC RX REV CODE- 636/320: Performed by: EMERGENCY MEDICINE

## 2018-11-19 PROCEDURE — 99285 EMERGENCY DEPT VISIT HI MDM: CPT

## 2018-11-19 PROCEDURE — 36415 COLL VENOUS BLD VENIPUNCTURE: CPT

## 2018-11-19 PROCEDURE — 74011000250 HC RX REV CODE- 250: Performed by: EMERGENCY MEDICINE

## 2018-11-19 PROCEDURE — 83690 ASSAY OF LIPASE: CPT

## 2018-11-19 PROCEDURE — 82550 ASSAY OF CK (CPK): CPT

## 2018-11-19 PROCEDURE — 96374 THER/PROPH/DIAG INJ IV PUSH: CPT

## 2018-11-19 PROCEDURE — 71045 X-RAY EXAM CHEST 1 VIEW: CPT

## 2018-11-19 PROCEDURE — 93005 ELECTROCARDIOGRAM TRACING: CPT

## 2018-11-19 PROCEDURE — 85025 COMPLETE CBC W/AUTO DIFF WBC: CPT

## 2018-11-19 PROCEDURE — 84484 ASSAY OF TROPONIN QUANT: CPT

## 2018-11-19 PROCEDURE — 74011250636 HC RX REV CODE- 250/636: Performed by: EMERGENCY MEDICINE

## 2018-11-19 RX ORDER — ASPIRIN 325 MG
325 TABLET ORAL
Status: COMPLETED | OUTPATIENT
Start: 2018-11-19 | End: 2018-11-19

## 2018-11-19 RX ORDER — SODIUM CHLORIDE 0.9 % (FLUSH) 0.9 %
10 SYRINGE (ML) INJECTION
Status: COMPLETED | OUTPATIENT
Start: 2018-11-19 | End: 2018-11-19

## 2018-11-19 RX ORDER — NITROGLYCERIN 0.4 MG/1
0.4 TABLET SUBLINGUAL ONCE
Status: COMPLETED | OUTPATIENT
Start: 2018-11-19 | End: 2018-11-19

## 2018-11-19 RX ORDER — SODIUM CHLORIDE 9 MG/ML
50 INJECTION, SOLUTION INTRAVENOUS
Status: COMPLETED | OUTPATIENT
Start: 2018-11-19 | End: 2018-11-19

## 2018-11-19 RX ADMIN — NITROGLYCERIN 0.4 MG: 0.4 TABLET, ORALLY DISINTEGRATING SUBLINGUAL at 20:14

## 2018-11-19 RX ADMIN — ASPIRIN 325 MG: 325 TABLET ORAL at 20:14

## 2018-11-19 RX ADMIN — SODIUM CHLORIDE 50 ML/HR: 900 INJECTION, SOLUTION INTRAVENOUS at 22:18

## 2018-11-19 RX ADMIN — FAMOTIDINE 20 MG: 10 INJECTION, SOLUTION INTRAVENOUS at 23:54

## 2018-11-19 RX ADMIN — Medication 10 ML: at 22:18

## 2018-11-19 RX ADMIN — LIDOCAINE HYDROCHLORIDE 40 ML: 20 SOLUTION ORAL; TOPICAL at 23:52

## 2018-11-19 RX ADMIN — IOPAMIDOL 100 ML: 755 INJECTION, SOLUTION INTRAVENOUS at 22:18

## 2018-11-20 VITALS
HEIGHT: 65 IN | RESPIRATION RATE: 15 BRPM | OXYGEN SATURATION: 100 % | WEIGHT: 139.77 LBS | BODY MASS INDEX: 23.29 KG/M2 | HEART RATE: 54 BPM | DIASTOLIC BLOOD PRESSURE: 66 MMHG | TEMPERATURE: 98.4 F | SYSTOLIC BLOOD PRESSURE: 136 MMHG

## 2018-11-20 LAB
ATRIAL RATE: 57 BPM
CALCULATED P AXIS, ECG09: 9 DEGREES
CALCULATED R AXIS, ECG10: 49 DEGREES
CALCULATED T AXIS, ECG11: 51 DEGREES
DIAGNOSIS, 93000: NORMAL
P-R INTERVAL, ECG05: 146 MS
Q-T INTERVAL, ECG07: 414 MS
QRS DURATION, ECG06: 88 MS
QTC CALCULATION (BEZET), ECG08: 402 MS
VENTRICULAR RATE, ECG03: 57 BPM

## 2018-11-20 RX ORDER — FAMOTIDINE 20 MG/1
20 TABLET, FILM COATED ORAL 2 TIMES DAILY
Qty: 20 TAB | Refills: 0 | Status: SHIPPED | OUTPATIENT
Start: 2018-11-20 | End: 2018-11-30

## 2018-11-20 RX ORDER — TRAMADOL HYDROCHLORIDE 50 MG/1
50 TABLET ORAL
Qty: 10 TAB | Refills: 0 | Status: SHIPPED | OUTPATIENT
Start: 2018-11-20 | End: 2018-11-28 | Stop reason: ALTCHOICE

## 2018-11-20 NOTE — ED NOTES
Care assumed of patient @ this time & intro with rounding done, with report from [facility], RN_________________. Patient resting quietly on stretcher without verbal complaints.

## 2018-11-20 NOTE — ED NOTES
Dr _____Wong___________________ in to talk with patient and explain plan of care with  understanding and  written & verbal instructions.

## 2018-11-20 NOTE — DISCHARGE INSTRUCTIONS
Thank you for allowing us to take care of you today! We hope we addressed all of your concerns and needs. We strive to provide excellent quality care in the Emergency Department. You will receive a survey after your visit to evaluate the care you were provided. Should you receive a survey from us, we invite you to share your experience and tell us what made it excellent. It was a pleasure serving you, we invite you to share your experience with us, in our pursuit for excellence, should you be selected to receive a survey. The exam and treatment you received in the Emergency Department were for an urgent problem and are not intended as complete care. It is important that you follow up with a doctor, nurse practitioner, or physician assistant for ongoing care. If your symptoms become worse or you do not improve as expected and you are unable to reach your usual health care provider, you should return to the Emergency Department. We are available 24 hours a day. Please take your discharge instructions with you when you go to your follow-up appointment. If you have any problem arranging a follow-up appointment, contact the Emergency Department immediately. If a prescription has been provided, please have it filled as soon as possible to prevent a delay in treatment. Read the entire medication instruction sheet provided to you by the pharmacy. If you have any questions or reservations about taking the medication due to side effects or interactions with other medications, please call your primary care physician or contact the ER to speak with the charge nurse. Make an appointment with your family doctor or the physician you were referred to for follow-up of this visit as instructed on your discharge paperwork, as this is mandatory follow-up. Return to the ER if you are unable to be seen or if you are unable to be seen in a timely manner.     If you have any problem arranging the follow-up visit, contact the Emergency Department immediately. I hope you feel better and thank you again for allow us to provide you with excellent care today at [facility]! Warmest regards,    Lorena Chapman MD  Emergency Medicine Physician  [facility]           Chest Pain: Care Instructions  Your Care Instructions    There are many things that can cause chest pain. Some are not serious and will get better on their own in a few days. But some kinds of chest pain need more testing and treatment. Your doctor may have recommended a follow-up visit in the next 8 to 12 hours. If you are not getting better, you may need more tests or treatment. Even though your doctor has released you, you still need to watch for any problems. The doctor carefully checked you, but sometimes problems can develop later. If you have new symptoms or if your symptoms do not get better, get medical care right away. If you have worse or different chest pain or pressure that lasts more than 5 minutes or you passed out (lost consciousness), call 911 or seek other emergency help right away. A medical visit is only one step in your treatment. Even if you feel better, you still need to do what your doctor recommends, such as going to all suggested follow-up appointments and taking medicines exactly as directed. This will help you recover and help prevent future problems. How can you care for yourself at home? · Rest until you feel better. · Take your medicine exactly as prescribed. Call your doctor if you think you are having a problem with your medicine. · Do not drive after taking a prescription pain medicine. When should you call for help? Call 911 if:    · You passed out (lost consciousness).     · You have severe difficulty breathing.     · You have symptoms of a heart attack. These may include:  ?  Chest pain or pressure, or a strange feeling in your chest.  ? Sweating. ? Shortness of breath. ? Nausea or vomiting. ? Pain, pressure, or a strange feeling in your back, neck, jaw, or upper belly or in one or both shoulders or arms. ? Lightheadedness or sudden weakness. ? A fast or irregular heartbeat. After you call 911, the  may tell you to chew 1 adult-strength or 2 to 4 low-dose aspirin. Wait for an ambulance. Do not try to drive yourself.    Call your doctor today if:    · You have any trouble breathing.     · Your chest pain gets worse.     · You are dizzy or lightheaded, or you feel like you may faint.     · You are not getting better as expected.     · You are having new or different chest pain. Where can you learn more? Go to http://ivette-nida.info/. Enter A120 in the search box to learn more about \"Chest Pain: Care Instructions. \"  Current as of: November 20, 2017  Content Version: 11.8  © 2735-7063 [employer]. Care instructions adapted under license by [employer] (which disclaims liability or warranty for this information). If you have questions about a medical condition or this instruction, always ask your healthcare professional. [place] [address] any warranty or liability for your use of this information.

## 2018-11-20 NOTE — ED PROVIDER NOTES
EMERGENCY DEPARTMENT HISTORY AND PHYSICAL EXAM 
 
 
Date: 11/19/2018 Patient Name: Alban Washburn History of Presenting Illness Chief Complaint Patient presents with  Chest Pain Pt c/o mid center chest pain and acid reflux since 3pm  
 Epigastric Pain History Provided By: Patient HPI: Alban Washburn, 81 y.o. female with PMHx significant for CAD and HTN, presents herself to the ED with cc of intermittent, sharp nonradiating CP since 3:00 PM today (11/19/18). Pt reports associated back pain since onset of CP. She notes of taking her baby ASA today w/out any relief to sx. Pt denies taking any OTC pain medicine PTA. Pt reports of being compliant with her baby ASA. Additionally, pt specifically denies any recent fever, chills, headache, nausea, vomiting, diarrhea, abdominal pain, SOB, lightheadedness, dizziness, numbness, weakness, tingling, BLE swelling, heart palpitations, urinary sxs, changes in BM, changes in PO intake, melena, hematochezia, cough, or congestion. PCP: Brandon Crowe MD  
Cardiology: Dr. Roberta Medina PMHx: Significant for CAD and HTN 
PSHx: Significant for CABG Social Hx: -tobacco, -EtOH, -Illicit Drugs There are no other complaints, changes or physical findings at this time. Past History Past Medical History: 
Past Medical History:  
Diagnosis Date  Back pain  CAD (coronary artery disease) CABG, [place]  Dry eye  Hypercholesterolemia  Hypertension Past Surgical History: 
Past Surgical History:  
Procedure Laterality Date  HX CORONARY ARTERY BYPASS GRAFT  2002  HX GYN    
 HX TOTAL VAGINAL HYSTERECTOMY  2014 Family History: 
Family History Problem Relation Age of Onset  Heart Attack Mother  Heart Disease Mother  Heart Disease Father  Heart Disease Sister  Emphysema Sister Social History: 
Social History Tobacco Use  Smoking status: Never Smoker  Smokeless tobacco: Never Used Substance Use Topics  Alcohol use: Yes Comment: occ  Drug use: No  
 
 
Allergies: Allergies Allergen Reactions  Amoxicillin Rash  Amoxicillin Rash  Sulfa (Sulfonamide Antibiotics) Nausea Only Review of Systems Review of Systems Constitutional: Negative. Negative for chills and fever. HENT: Negative. Negative for congestion, facial swelling, rhinorrhea, sore throat, trouble swallowing and voice change. Eyes: Negative. Respiratory: Negative. Negative for apnea, cough, chest tightness, shortness of breath and wheezing. Cardiovascular: Positive for chest pain. Negative for palpitations and leg swelling. Gastrointestinal: Negative. Negative for abdominal distention, abdominal pain, blood in stool, constipation, diarrhea, nausea and vomiting. Endocrine: Negative. Negative for cold intolerance, heat intolerance and polyuria. Genitourinary: Negative. Negative for difficulty urinating, dysuria, flank pain, frequency, hematuria and urgency. Musculoskeletal: Positive for back pain. Negative for arthralgias, myalgias, neck pain and neck stiffness. Skin: Negative. Negative for color change and rash. Neurological: Negative. Negative for dizziness, syncope, facial asymmetry, speech difficulty, weakness, light-headedness, numbness and headaches. Hematological: Negative. Does not bruise/bleed easily. Psychiatric/Behavioral: Negative. Negative for confusion and self-injury. The patient is not nervous/anxious. Physical Exam  
Physical Exam  
Constitutional: She is oriented to person, place, and time. She appears well-developed and well-nourished. No distress. HENT:  
Head: Normocephalic and atraumatic. Mouth/Throat: Oropharynx is clear and moist. No oropharyngeal exudate. Eyes: Conjunctivae and EOM are normal. Pupils are equal, round, and reactive to light. Neck: Normal range of motion. Cardiovascular: Normal rate, regular rhythm and normal heart sounds. Exam reveals no gallop and no friction rub. No murmur heard. Pulmonary/Chest: Effort normal and breath sounds normal. No respiratory distress. She has no wheezes. She has no rales. She exhibits no tenderness. Abdominal: Soft. Bowel sounds are normal. She exhibits no distension and no mass. There is no tenderness. There is no rebound and no guarding. Midline sternotomy scar Musculoskeletal: Normal range of motion. She exhibits no edema, tenderness or deformity. Neurological: She is alert and oriented to person, place, and time. She displays normal reflexes. No cranial nerve deficit. She exhibits normal muscle tone. Coordination normal.  
Skin: Skin is warm. No rash noted. She is not diaphoretic. Psychiatric: She has a normal mood and affect. Nursing note and vitals reviewed. Diagnostic Study Results Labs - Recent Results (from the past 12 hour(s)) EKG, 12 LEAD, INITIAL Collection Time: 11/19/18  6:55 PM  
Result Value Ref Range Ventricular Rate 57 BPM  
 Atrial Rate 57 BPM  
 P-R Interval 146 ms  
 QRS Duration 88 ms Q-T Interval 414 ms QTC Calculation (Bezet) 402 ms Calculated P Axis 9 degrees Calculated R Axis 49 degrees Calculated T Axis 51 degrees Diagnosis Sinus bradycardia Cannot rule out Anterior infarct , age undetermined When compared with ECG of 19-SEP-2017 11:38, No significant change was found CBC WITH AUTOMATED DIFF Collection Time: 11/19/18  7:47 PM  
Result Value Ref Range WBC 6.8 3.6 - 11.0 K/uL  
 RBC 3.76 (L) 3.80 - 5.20 M/uL  
 HGB 11.5 11.5 - 16.0 g/dL HCT 34.8 (L) 35.0 - 47.0 % MCV 92.6 80.0 - 99.0 FL  
 MCH 30.6 26.0 - 34.0 PG  
 MCHC 33.0 30.0 - 36.5 g/dL  
 RDW 12.8 11.5 - 14.5 % PLATELET 117 633 - 328 K/uL MPV 10.4 8.9 - 12.9 FL  
 NRBC 0.0 0  WBC ABSOLUTE NRBC 0.00 0.00 - 0.01 K/uL NEUTROPHILS 49 32 - 75 % LYMPHOCYTES 36 12 - 49 % MONOCYTES 8 5 - 13 % EOSINOPHILS 6 0 - 7 % BASOPHILS 1 0 - 1 % IMMATURE GRANULOCYTES 0 0.0 - 0.5 % ABS. NEUTROPHILS 3.3 1.8 - 8.0 K/UL  
 ABS. LYMPHOCYTES 2.4 0.8 - 3.5 K/UL  
 ABS. MONOCYTES 0.6 0.0 - 1.0 K/UL  
 ABS. EOSINOPHILS 0.4 0.0 - 0.4 K/UL  
 ABS. BASOPHILS 0.1 0.0 - 0.1 K/UL  
 ABS. IMM. GRANS. 0.0 0.00 - 0.04 K/UL  
 DF AUTOMATED METABOLIC PANEL, COMPREHENSIVE Collection Time: 11/19/18  7:47 PM  
Result Value Ref Range Sodium 140 136 - 145 mmol/L Potassium 4.1 3.5 - 5.1 mmol/L Chloride 106 97 - 108 mmol/L  
 CO2 28 21 - 32 mmol/L Anion gap 6 5 - 15 mmol/L Glucose 90 65 - 100 mg/dL BUN 32 (H) 6 - 20 MG/DL Creatinine 1.18 (H) 0.55 - 1.02 MG/DL  
 BUN/Creatinine ratio 27 (H) 12 - 20 GFR est AA 55 (L) >60 ml/min/1.73m2 GFR est non-AA 45 (L) >60 ml/min/1.73m2 Calcium 8.9 8.5 - 10.1 MG/DL Bilirubin, total 0.2 0.2 - 1.0 MG/DL  
 ALT (SGPT) 22 12 - 78 U/L  
 AST (SGOT) 22 15 - 37 U/L Alk. phosphatase 87 45 - 117 U/L Protein, total 6.8 6.4 - 8.2 g/dL Albumin 3.6 3.5 - 5.0 g/dL Globulin 3.2 2.0 - 4.0 g/dL A-G Ratio 1.1 1.1 - 2.2 CK W/ REFLX CKMB Collection Time: 11/19/18  7:47 PM  
Result Value Ref Range CK 66 26 - 192 U/L  
TROPONIN I Collection Time: 11/19/18  7:47 PM  
Result Value Ref Range Troponin-I, Qt. <0.05 <0.05 ng/mL LIPASE Collection Time: 11/19/18  7:47 PM  
Result Value Ref Range Lipase 243 73 - 393 U/L  
TROPONIN I Collection Time: 11/19/18 10:44 PM  
Result Value Ref Range Troponin-I, Qt. <0.05 <0.05 ng/mL Radiologic Studies -  
CT Results  (Last 48 hours)  
          
 11/19/18 3994  CTA CHEST W OR W WO CONT Final result Impression:  IMPRESSION:   
1. No visualized pulmonary embolus. 2. Incidental findings as above Narrative:  EXAM:  CTA CHEST W OR W WO CONT INDICATION:  eval for dissection. Additional history: COMPARISON: None.   
.  
TECHNIQUE:   
 Precontrast  images were obtained to localize the volume for acquisition. Multislice helical CT arteriography was performed from the diaphragm to the  
thoracic inlet during uneventful rapid bolus intravenous contrast  
administration. Lung and soft tissue windows were generated. Coronal and  
sagittal images were generated and 3D post processing consisting of coronal  
maximum intensity images was performed. CT dose reduction was achieved through use of a standardized protocol tailored  
for this examination and automatic exposure control for dose modulation. Jovan Barone FINDINGS:  
CHEST:  
Chest wall/thoracic inlet: Within normal limits. Thyroid: Within normal limits. Mediastinum/iris: Within normal limits. Heart/vessels: No visible pulmonary embolus. Status post CABG with a LIMA graft. Lungs/Pleura: Within normal limits. Jovan Barone INCIDENTALLY IMAGED ABDOMEN:  
Low-attenuation lesion in the posterior dome of the liver, incompletely  
characterized, measuring approximately 1.7 cm in diameter. .  
MSK:   
Degenerative changes in the spine. .  
  
  
 
CXR Results  (Last 48 hours)  
          
 11/19/18 2004  XR CHEST PORT Final result Impression:   impression: No acute infiltrate. Narrative:  Clinical indication: Chest pain. Portable AP upright view of the chest is obtained, comparison September 2017. Prior sternotomy, the heart size is normal. There is no acute infiltrate. Medical Decision Making I am the first provider for this patient. I reviewed the vital signs, available nursing notes, past medical history, past surgical history, family history and social history. Vital Signs-Reviewed the patient's vital signs. Patient Vitals for the past 12 hrs: 
 Temp Pulse Resp BP SpO2  
11/19/18 2330  (!) 54 15 136/66 100 % 11/19/18 2315  (!) 54 17 141/63 98 % 11/19/18 2300  (!) 54 14 128/62 100 % 11/19/18 2245  (!) 55 13 128/54 100 % 11/19/18 2231  (!) 57 11  100 % 11/19/18 2230    128/61   
11/19/18 2200  (!) 51 17 112/57 97 % 11/19/18 2145  (!) 53 12 119/56 99 % 11/19/18 2130  (!) 50 15 114/59 98 % 11/19/18 2115  (!) 51 10 119/54 99 % 11/19/18 2100  (!) 52 14 115/60 99 % 11/19/18 2047  (!) 51 17  100 % 11/19/18 2046    126/64   
11/19/18 2030  (!) 51 15 117/59 98 % 11/19/18 2015  (!) 55 17 130/69 100 % 11/19/18 2000  (!) 56 16 148/65 100 % 11/19/18 1951  (!) 55 16 152/67 99 % 11/19/18 1850 98.4 °F (36.9 °C) (!) 57 16 168/63 100 % Pulse Oximetry Analysis - 100% on 0L Cardiac Monitor:  
Rate: 57 bpm 
Rhythm: Sinus Bradycardia ED EKG interpretation: 18:55 Rhythm: sinus bradycardia; and regular . Rate (approx.): 57; Axis: normal; P wave: normal; QRS interval: normal ; ST/T wave: normal; Other findings: Cannot rule out anterior infarct, age undetermined. This EKG was interpreted by Greg De La Cruz M.D. Records Reviewed: Nursing Notes, Old Medical Records, Previous electrocardiograms, Previous Radiology Studies and Previous Laboratory Studies Provider Notes (Medical Decision Making): DDx: atypical chest pain, ACS, costochondritis, PNA, bronchitis, CHF, pleural effusion, MSK pain, GERD, pancreatitis Pt presents with acute chest pain; stable vitals and nontoxic appearing. The pt is unlikely to have PE. There is no pleuritic chest pain, no tachycardia, no hypoxia, pt non-smoker, no unilateral leg swelling, no hormone use, no recent travel/immobilization, no recent surgery. Therefore no d-dimer or PE The pt is unlikely to have aortic dissection. The pulses are equal, there is no sharp tearing chest pain radiating to back, the patient is not extremely hypertensive. Therefore no d-dimer or CTA chest for dissection Patient not extremely hypertensive, unlikely to be hypertensive emergency.   No history of valve abnormality and no harsh murmur, no signs of flash pulmonary edema, therefore less likely ruptured valve. Endocarditis unlikely -- no IV drug abuse, native valve, no fevers, patient well appearing, no murmurs/splinter hemorrhages/janeway lesions or oslar nodes The pt is unlikely to have esophageal rupture. There is no history of forceful vomiting, patient well appearing, no difficulty swallowing Will pursue cardiac work-up with EKG, troponin, ckmb, CXR. While the pt is less likely to have pneumonia as there is no cough and no fever, and less likely to have ptx, will order CXR to assess lung fields. Will provide pain control and reassess. ED Course:  
Initial assessment performed. The patients presenting problems have been discussed, and they are in agreement with the care plan formulated and outlined with them. I have encouraged them to ask questions as they arise throughout their visit. Medications Administered during ED Management: 
Medications  
aspirin tablet 325 mg (325 mg Oral Given 11/19/18 2014)  
nitroglycerin (NITROSTAT) tablet 0.4 mg (0.4 mg SubLINGual Given 11/19/18 2014)  
0.9% sodium chloride infusion (50 mL/hr IntraVENous New Bag 11/19/18 2218) iopamidol (ISOVUE-370) 76 % injection 100 mL (100 mL IntraVENous Given 11/19/18 2218) sodium chloride (NS) flush 10 mL (10 mL IntraVENous Given 11/19/18 2218) mylanta/viscous lidocaine (GI COCKTAIL) (40 mL Oral Given 11/19/18 2352) famotidine (PF) (PEPCID) 20 mg in sodium chloride 0.9% 10 mL injection (20 mg IntraVENous Given 11/19/18 2354) I reviewed our electronic medical record system for any past medical records that were available that may contribute to the patients current condition, the nursing notes and vital signs from today's visit. Rafat Culver MD 
 
ED Course as of Nov 20 0002 Mon Nov 19, 2018  
2000 Per chart review, pt's last echo was in 2012 showing an EF of 60%. [HS] 2344 Pt ambulated without any difficulty and any pain. Will discharge pt home.   [HS] ED Course User Index [HS] Son, La Nena Herrera PROGRESS NOTE:  
11:20 PM 
Pt has been re-examined by Angelita Leung MD. Pt states she has had another episode of chest pain. Will consult with cardiology. Consult Note: 
11:35 PM 
Angelita Leung MD spoke with Dr. Antione Gandhi, Specialty: Cardiology Discussed pt's hx, disposition, and available diagnostic and imaging results. Reviewed care plans. Consultant agrees with plans as outlined. Recommends ambulation for pt. Should pt have any pain with ambulation, pt is to be admitted. Progress Note 11:40 PM 
Patient reports feeling better and symptoms have improved after ED treatment. Pt able to tolerate PO and ambulate per baseline. Tara Botello's final labs and imaging (including incidental liver lesion) have been reviewed with her. She has been counseled regarding her diagnosis. She verbally conveys understanding and agreement of the signs, symptoms, diagnosis, treatment and prognosis and additionally agrees to follow up as recommended with Dr. Mayi Srinivasan MD in 24 - 48 hours. She also agrees with the care-plan and conveys that all of her questions have been answered. I have also put together some discharge instructions for her that include: 1) educational information regarding their diagnosis, 2) how to care for their diagnosis at home, as well a 3) list of reasons why they would want to return to the ED prior to their follow-up appointment, should their condition change. Critical Care Time:  
0 min Discharge Note: 
12:02 AM 
The pt is ready for discharge. The pt's signs, symptoms, diagnosis, and discharge instructions have been discussed and pt has conveyed their understanding. The pt is to follow up as recommended or return to ER should their symptoms worsen. Plan has been discussed and pt is in agreement. PLAN: 
1. Current Discharge Medication List  
  
No current facility-administered medications for this encounter. Current Outpatient Medications:  
  famotidine (PEPCID) 20 mg tablet, Take 1 Tab by mouth two (2) times a day for 10 days. , Disp: 20 Tab, Rfl: 0 
  traMADol (ULTRAM) 50 mg tablet, Take 1 Tab by mouth every six (6) hours as needed for Pain. Max Daily Amount: 200 mg., Disp: 10 Tab, Rfl: 0 
  PARoxetine (PAXIL) 10 mg tablet, Take 1 Tab by mouth daily. , Disp: 90 Tab, Rfl: 3 
  calcium carbonate (CALTREX) 600 mg calcium (1,500 mg) tablet, Take 600 mg by mouth two (2) times a day., Disp: , Rfl:  
  fish oil-omega-3 fatty acids (FISH OIL) 340-1,000 mg capsule, Take 1 Cap by mouth two (2) times a day., Disp: , Rfl:  
  lisinopril (PRINIVIL, ZESTRIL) 10 mg tablet, Take 1 Tab by mouth daily. , Disp: 90 Tab, Rfl: 3 
  metoprolol succinate (TOPROL-XL) 50 mg XL tablet, TAKE 1 TABLET BY MOUTH EVERY DAY, Disp: 90 Tab, Rfl: 3 
  pravastatin (PRAVACHOL) 80 mg tablet, Take 1 Tab by mouth nightly., Disp: 90 Tab, Rfl: 3 
  aspirin (SINDY CHILDRENS ASPIRIN) 81 mg chewable tablet, Take 81 mg by mouth daily. , Disp: , Rfl:  
  cholecalciferol, vitamin d3, (VITAMIN D) 1,000 unit tablet, Take 1,000 Units by mouth daily. , Disp: , Rfl:  
 
2. Follow-up Information Follow up With Specialties Details Why Contact Info Gurdeep Jenkins MD [facility]   [address] [facility] [address] [phone] [facility] EMERGENCY DEPT Emergency Medicine  As needed, If symptoms worsen [address] [facility] 
[phone] Return to ED if worse Diagnosis Clinical Impression: 1. Acute chest pain 2. Atypical chest pain 3. Gastroesophageal reflux disease without esophagitis 4. Peptic ulcer disease 5. Liver lesion Attestations This note is prepared by [employer], acting as Scribe for MD Jason Panchal MD : The scribe's documentation has been prepared under my direction and personally reviewed by me in its entirety.  I confirm that the note above accurately reflects all work, treatment, procedures, and medical decision making performed by me. This note will not be viewable in [address]

## 2018-11-21 ENCOUNTER — TELEPHONE (OUTPATIENT)
Dept: FAMILY MEDICINE CLINIC | Age: 73
End: 2018-11-21

## 2018-11-28 ENCOUNTER — OFFICE VISIT (OUTPATIENT)
Dept: FAMILY MEDICINE CLINIC | Age: 73
End: 2018-11-28

## 2018-11-28 VITALS
TEMPERATURE: 97.5 F | WEIGHT: 138 LBS | SYSTOLIC BLOOD PRESSURE: 129 MMHG | BODY MASS INDEX: 22.99 KG/M2 | HEART RATE: 54 BPM | HEIGHT: 65 IN | DIASTOLIC BLOOD PRESSURE: 58 MMHG | RESPIRATION RATE: 18 BRPM | OXYGEN SATURATION: 95 %

## 2018-11-28 DIAGNOSIS — F41.9 ANXIETY AND DEPRESSION: ICD-10-CM

## 2018-11-28 DIAGNOSIS — K21.9 GASTROESOPHAGEAL REFLUX DISEASE WITHOUT ESOPHAGITIS: Primary | ICD-10-CM

## 2018-11-28 DIAGNOSIS — F32.A ANXIETY AND DEPRESSION: ICD-10-CM

## 2018-11-28 RX ORDER — PAROXETINE 10 MG/1
20 TABLET, FILM COATED ORAL DAILY
Qty: 90 TAB | Refills: 0
Start: 2018-11-28 | End: 2018-12-26 | Stop reason: SDUPTHER

## 2018-11-28 NOTE — PROGRESS NOTES
Chief Complaint   Patient presents with   [facility] ED Follow-up     1. Have you been to the ER, urgent care clinic since your last visit? Hospitalized since your last visit? Yes When: [facility] for chest pain     2. Have you seen or consulted any other health care providers outside of the [address] [place] [address] since your last visit? Include any pap smears or colon screening. No     Pt has an appointment with cardiologist next month. Cardiologist : Dr. Roseann Oro.      PHQ over the last two weeks 11/28/2018   PHQ Not Done -   Little interest or pleasure in doing things Not at all   Feeling down, depressed, irritable, or hopeless More than half the days   Total Score PHQ 2 2

## 2018-11-28 NOTE — PROGRESS NOTES
Subjective:     Chief Complaint   Patient presents with    ED Follow-up        HPI:  Alphonsus Olszewski is a 81 y.o. female presents for follow up appointment after being [facility] [id number] [facility] on 11/19/18 for chest pain (intermittent) and indigestion feeling. She had CXR and CT and EKG and labs done. CTA CHEST W OR W WO CONT Final result       Impression:   IMPRESSION:    1. No visualized pulmonary embolus. 2. Incidental findings as above        Narrative:   EXAM:  CTA CHEST W OR W WO CONT   INDICATION:  eval for dissection. Additional history:   COMPARISON: None. .   TECHNIQUE:    Precontrast  images were obtained to localize the volume for acquisition. Multislice helical CT arteriography was performed from the diaphragm to the   thoracic inlet during uneventful rapid bolus intravenous contrast   administration. Lung and soft tissue windows were generated. Coronal and   sagittal images were generated and 3D post processing consisting of coronal   maximum intensity images was performed. CT dose reduction was achieved through use of a standardized protocol tailored   for this examination and automatic exposure control for dose modulation. [employer] FINDINGS:   CHEST:   Chest wall/thoracic inlet: Within normal limits. Thyroid: Within normal limits. Mediastinum/iris: Within normal limits. Heart/vessels: No visible pulmonary embolus. Status post CABG with a LIMA graft. Lungs/Pleura: Within normal limits. .   INCIDENTALLY IMAGED ABDOMEN:   Low-attenuation lesion in the posterior dome of the liver, incompletely   characterized, measuring approximately 1.7 cm in diameter. .   MSK:    Degenerative changes in the spine. .                    CXR Results  (Last 48 hours)                 11/19/18 2004   XR CHEST PORT Final result     Impression:    impression: No acute infiltrate.        Narrative:   Clinical indication: Chest pain.        Portable AP upright view of the chest is obtained, comparison 2017. Prior sternotomy, the heart size is normal. There is no acute infiltrate. Incidental liver lesion noted on CTA chest that was noted to follow up with PCP. Upon chart review, she had liver US done in  and no change in size is noted. Diagnosed with GERD, told to follow up with PCP and cardiology. Has not had any CP since ER visit. Cardiologist is Dr Jacob Menon, she still needs to make appointment with him. Started Paxil for depression end of September. At follow up end of October she was doing really well on Paxil 10mg and felt that her depression was controlled and felt really good. She notes that about a week or so ago she started to feel her symptoms again \"dont want to do anything and I have not even been shopping yet\". She says that she usually feels \"down around the holidays since my  \". Denies SI/HI. No hospital, ER or specialist visits since last primary care visit except as noted above. Past Medical History:   Diagnosis Date    Back pain     CAD (coronary artery disease)     CABG, [place]    Dry eye     Hypercholesterolemia     Hypertension        Social History     Tobacco Use    Smoking status: Never Smoker    Smokeless tobacco: Never Used   Substance Use Topics    Alcohol use: Yes     Comment: occ    Drug use: No       Outpatient Medications Marked as Taking for the 18 encounter (Office Visit) with Maricarmen Judd NP   Medication Sig Dispense Refill    famotidine (PEPCID) 20 mg tablet Take 1 Tab by mouth two (2) times a day for 10 days. 20 Tab 0    traMADol (ULTRAM) 50 mg tablet Take 1 Tab by mouth every six (6) hours as needed for Pain. Max Daily Amount: 200 mg. 10 Tab 0    PARoxetine (PAXIL) 10 mg tablet Take 1 Tab by mouth daily. 90 Tab 3    calcium carbonate (CALTREX) 600 mg calcium (1,500 mg) tablet Take 600 mg by mouth two (2) times a day.       fish oil-omega-3 fatty acids (FISH OIL) 340-1,000 mg capsule Take 1 Cap by mouth two (2) times a day.  lisinopril (PRINIVIL, ZESTRIL) 10 mg tablet Take 1 Tab by mouth daily. 90 Tab 3    metoprolol succinate (TOPROL-XL) 50 mg XL tablet TAKE 1 TABLET BY MOUTH EVERY DAY 90 Tab 3    pravastatin (PRAVACHOL) 80 mg tablet Take 1 Tab by mouth nightly. 90 Tab 3    aspirin (SINDY CHILDRENS ASPIRIN) 81 mg chewable tablet Take 81 mg by mouth daily.  cholecalciferol, vitamin d3, (VITAMIN D) 1,000 unit tablet Take 1,000 Units by mouth daily. Allergies   Allergen Reactions    Amoxicillin Rash    Amoxicillin Rash    Sulfa (Sulfonamide Antibiotics) Nausea Only       Health Maintenance reviewed       ROS:  Gen: no fatigue, no fever, no chills, no unexplained weight loss or weight gain  Eyes: no excessive tearing, itching, or discharge  Nose: no rhinorrhea, no sinus pain  Mouth: no oral lesions, no sore throat, no difficulty swallowing  Resp: no shortness of breath, no wheezing, no cough  CV: no chest pain, no orthopnea, no paroxysmal nocturnal dyspnea, no lower extremity edema, no palpitations  Abd: no nausea, no heartburn, no diarrhea, no constipation, no abdominal pain  Neuro: no headaches, no syncope or presyncopal episodes  Endo: no polyuria, no polydipsia.     : no hematuria, no dysuria, no frequency, no incontinence  Heme: no lymphadenopathy, no easy bruising or bleeding, no night sweats  MSK: no joint pain or swelling    PE:  Visit Vitals  /58 (BP 1 Location: Left arm, BP Patient Position: Sitting)   Pulse (!) 54   Temp 97.5 °F (36.4 °C) (Oral)   Resp 18   Ht 5' 5\" (1.651 m)   Wt 138 lb (62.6 kg)   SpO2 95%   BMI 22.96 kg/m²     Gen: alert, oriented, no acute distress  Head: normocephalic, atraumatic  Ears: external auditory canals clear, TMs without erythema or effusion  Eyes: pupils equal round reactive to light, sclera clear, conjunctiva clear  Oral: moist mucus membranes, no oral lesions, no pharyngeal inflammation or exudate  Neck: symmetric normal sized thyroid, no carotid bruits, no jugular vein distention  Resp: no increase work of breathing, lungs clear to ausculation bilaterally, no wheezing, rales or rhonchi  CV: S1, S2 normal.  No murmurs, rubs, or gallops. Abd: soft, not tender, not distended. No hepatosplenomegaly. Normal bowel sounds. No hernias. No abdominal or renal bruits. Neuro: cranial nerves intact, normal strength and movement in all extremities, reflexes and sensation intact and symmetric. Skin: no lesion or rash  Extremities: no cyanosis or edema      Assessment/Plan:  Differential diagnosis and treatment options reviewed with patient who is in agreement with treatment plan as outlined below. ICD-10-CM ICD-9-CM    1. Gastroesophageal reflux disease without esophagitis K21.9 530.81    2. Anxiety and depression F41.9 300.00 PARoxetine (PAXIL) 10 mg tablet    F32.9 311      Continue current medication for GERD. Will increase paxil dose to 20 mg daily (take two 10mg Paxil pills daily) of her current pills. If doing well, she will call and let me know prior to needing refill and I will send refill for 20mg dose in for her. Encouraged counseling. Follow up in about 5 weeks or sooner if needed. She will call and make follow up appointment with cardiology. Discussed BMI and healthy weight. Encouraged patient to work to implement changes including diet high in raw fruits and vegetables, lean protein and good fats. Limit refined, processed carbohydrates and sugar. Encouraged regular exercise. Recommended regular cardiovascular exercise 3-6 times per week for 30-60 minutes daily. I have discussed the diagnosis with the patient and the intended plan as seen in the above orders. The patient has received an after-visit summary and questions were answered concerning future plans. I have discussed medication side effects and warnings with the patient as well.   The patient verbalizes understanding and agreement with the plan.

## 2018-11-28 NOTE — PATIENT INSTRUCTIONS
Teens Recovering From Depression: Care Instructions  Your Care Instructions    Taking good care of yourself is important as you recover from depression. In time, your symptoms will fade as your treatment takes hold. Do not give up. Instead, focus your energy on getting better. Your mood will improve. It just takes some time. Focus on things that can help you feel better, such as being with friends and family, eating well, and getting enough rest. But take things slowly. Do not do too much too soon. You will begin to feel better gradually. Follow-up care is a key part of your treatment and safety. Be sure to make and go to all appointments, and call your doctor if you are having problems. It's also a good idea to know your test results and keep a list of the medicines you take. How can you care for yourself at home? Be realistic  · If you have a large task to do, break it up into smaller steps you can handle, and just do what you can. · Think about putting off important decisions until your depression has lifted. If you have plans that will have a major impact on your life, such as dropping out of school or choosing a college, try to wait a bit. Talk it over with friends and family who can help you look at the overall picture. · Reach out to people for help. Do not isolate yourself. Let your family and friends help you. Find people you can trust and confide in, and talk to them. · Be patient, and be kind to yourself. Remember that depression is not your fault and is not something you can overcome with willpower alone. Treatment is necessary for depression, just like for any other illness. Feeling better takes time, and your mood will improve little by little. Stay active  · Stay busy and get outside. Join a school club or take part in school activities. Become a volunteer. · Get plenty of exercise every day. Go for a walk or jog, ride your bike, or play sports with friends.  Talk with your doctor about an exercise program. Exercise can help with mild depression. · Go to a movie or concert. Take part in a Sikh activity or other social gathering. Go to a sports event. · Ask a friend to do things with you. You could play a computer game, go shopping, or listen to music, for example. Follow your treatment plan  · If your doctor prescribed medicine, take it exactly as prescribed. Call your doctor if you think you are having a problem with your medicine. ? You may start to feel better within 1 to 3 weeks of taking antidepressant medicine. But it can take as many as 6 to 8 weeks to see more improvement. ? If you do not notice any improvement in 3 weeks, talk to your doctor. ? Antidepressants can make you feel tired, dizzy, or nervous. Some people have dry mouth, constipation, headaches, or diarrhea. Many of these side effects are mild and will go away on their own after you have been taking the medicine for a few weeks. Some may last longer. Talk to your doctor if side effects are bothering you too much. You might be able to try a different medicine. · Do not take medicines that have not been prescribed for you. They may interfere with medicines you may be taking for depression, or they may make your depression worse. · If you have a counselor, go to all your appointments. · Keep the numbers for these national suicide hotlines: 9-867-473-TALK (7-567-628-283-027-2263) and 5-315-ENYXYQN (8-421.565.4662). If you or someone you know talks about suicide or feeling hopeless, get help right away. Take care of yourself  · Eat a balanced diet with plenty of fresh fruits and vegetables, whole grains, and lean protein. If you have lost your appetite, eat small snacks rather than large meals. · Do not drink alcohol or use illegal drugs. · Get enough sleep. If you have problems sleeping:  ? Go to bed at the same time every night, and get up at the same time every morning. ? Keep your bedroom dark and quiet.   ? Do not exercise after 5:00 p.m.  ? Avoid drinks with caffeine after 5:00 p.m. · Avoid sleeping pills unless they are prescribed by the doctor treating your depression. Sleeping pills may make you groggy during the day, and they may interact with other medicine you are taking. · If you have any other illnesses, such as diabetes, make sure to continue with your treatment. Tell your doctor about all of the medicines you take, including those with or without a prescription. When should you call for help? Call 911 anytime you think you may need emergency care. For example, call if:    · You are thinking about suicide or are threatening suicide.     · You feel you cannot stop from hurting yourself or someone else.     · You hear or see things that aren't real.     · You think or speak in a bizarre way that is not like your usual behavior.    Call your doctor now or seek immediate medical care if:    · You are drinking a lot of alcohol or using illegal drugs.     · You are talking or writing about death.    Watch closely for changes in your health, and be sure to contact your doctor if:    · You find it hard or it's getting harder to deal with school, a job, family, or friends.     · You think your treatment is not helping or you are not getting better.     · Your symptoms get worse or you get new symptoms.     · You have any problems with your antidepressant medicines, such as side effects, or you are thinking about stopping your medicine.     · You are having manic behavior, such as having very high energy, needing less sleep than normal, or showing risky behavior such as spending money you don't have or abusing others verbally or physically. Where can you learn more? Go to http://ivette-nida.info/. Enter L325 in the search box to learn more about \"Teens Recovering From Depression: Care Instructions. \"  Current as of: December 7, 2017  Content Version: 11.8  © 5383-8514 Healthwise, [facility].  Care instructions adapted under license by [employer] (which disclaims liability or warranty for this information). If you have questions about a medical condition or this instruction, always ask your healthcare professional. [address] any warranty or liability for your use of this information.

## 2018-12-26 ENCOUNTER — TELEPHONE (OUTPATIENT)
Dept: FAMILY MEDICINE CLINIC | Age: 73
End: 2018-12-26

## 2018-12-26 DIAGNOSIS — F32.A ANXIETY AND DEPRESSION: ICD-10-CM

## 2018-12-26 DIAGNOSIS — F41.9 ANXIETY AND DEPRESSION: ICD-10-CM

## 2018-12-26 RX ORDER — PAROXETINE HYDROCHLORIDE 20 MG/1
20 TABLET, FILM COATED ORAL DAILY
Qty: 14 TAB | Refills: 0 | Status: SHIPPED | OUTPATIENT
Start: 2018-12-26 | End: 2019-01-03 | Stop reason: SDUPTHER

## 2018-12-26 NOTE — TELEPHONE ENCOUNTER
Called pt and verified name and . Scheduled an appointment with Bridgett Lopez on 1/3/18 for medication evaluation on paxil. Medication was increased from 1 tab to 2 tabs. Pt reported that she is going to be out of medication in 2 days. Please send a 2 week supply of paxil in [facility]. Thanks! Made the refill for dispense 30.

## 2019-01-03 ENCOUNTER — OFFICE VISIT (OUTPATIENT)
Dept: FAMILY MEDICINE CLINIC | Age: 74
End: 2019-01-03

## 2019-01-03 VITALS
HEART RATE: 51 BPM | DIASTOLIC BLOOD PRESSURE: 76 MMHG | HEIGHT: 65 IN | SYSTOLIC BLOOD PRESSURE: 144 MMHG | RESPIRATION RATE: 12 BRPM | TEMPERATURE: 97.7 F | OXYGEN SATURATION: 98 % | WEIGHT: 140 LBS | BODY MASS INDEX: 23.32 KG/M2

## 2019-01-03 DIAGNOSIS — F41.9 ANXIETY AND DEPRESSION: ICD-10-CM

## 2019-01-03 DIAGNOSIS — F32.A ANXIETY AND DEPRESSION: ICD-10-CM

## 2019-01-03 RX ORDER — PAROXETINE HYDROCHLORIDE 20 MG/1
20 TABLET, FILM COATED ORAL DAILY
Qty: 90 TAB | Refills: 1 | Status: SHIPPED | OUTPATIENT
Start: 2019-01-03 | End: 2019-07-01 | Stop reason: SDUPTHER

## 2019-01-03 NOTE — PROGRESS NOTES
Subjective:     Chief Complaint   Patient presents with    Request For New Medication        HPI:  Marlin Green is a 72 y.o. female presents for medication follow up. Treated for anxiety/depression and is Taking 20mg Paxil (increased dose from 10-20mg on 11/28/18) and has been feeling really good on the 20mg daily. Here for refill. Says that she received a letter from [employer] or [facility] \"well care\" that told her that her Paxil was gong to be expensive and suggested that she have a medication change. The paxil has also helped with menopausal symptoms. She is doing really well on the paxil. No SI/HI. No crying episodes. Feels much more interactive. Discussed that paxil is actually one of the cheapest antidepressants and not sure why the pharmacy company would suggest that but I do not want to change it because it is working well. Has cardiology appointment scheduled this month. No GERD symptoms since ER visit. BP is up a little but she admits that she has been eating a lot of ham.  Says she has been checking her BP at home and it has been really good. No CP, palpitations, dizziness, or vision changes. No hospital, ER or specialist visits since last primary care visit except as noted above. Past Medical History:   Diagnosis Date    Back pain     CAD (coronary artery disease)     CABG, [place]    Dry eye     Hypercholesterolemia     Hypertension        Social History     Tobacco Use    Smoking status: Never Smoker    Smokeless tobacco: Never Used   Substance Use Topics    Alcohol use: Yes     Comment: occ    Drug use: No       Outpatient Medications Marked as Taking for the 1/3/19 encounter (Office Visit) with Maricarmen Judd NP   Medication Sig Dispense Refill    PARoxetine (PAXIL) 20 mg tablet Take 1 Tab by mouth daily. 14 Tab 0    calcium carbonate (CALTREX) 600 mg calcium (1,500 mg) tablet Take 600 mg by mouth two (2) times a day.       fish oil-omega-3 fatty acids (FISH OIL) 340-1,000 mg capsule Take 1 Cap by mouth two (2) times a day.  lisinopril (PRINIVIL, ZESTRIL) 10 mg tablet Take 1 Tab by mouth daily. 90 Tab 3    metoprolol succinate (TOPROL-XL) 50 mg XL tablet TAKE 1 TABLET BY MOUTH EVERY DAY 90 Tab 3    pravastatin (PRAVACHOL) 80 mg tablet Take 1 Tab by mouth nightly. 90 Tab 3    aspirin (SINDY CHILDRENS ASPIRIN) 81 mg chewable tablet Take 81 mg by mouth daily.  cholecalciferol, vitamin d3, (VITAMIN D) 1,000 unit tablet Take 1,000 Units by mouth daily. Allergies   Allergen Reactions    Amoxicillin Rash    Amoxicillin Rash    Sulfa (Sulfonamide Antibiotics) Nausea Only       Health Maintenance reviewed       ROS:  Gen: no fatigue, no fever, no chills, no unexplained weight loss or weight gain  Eyes: no excessive tearing, itching, or discharge  Nose: no rhinorrhea, no sinus pain  Mouth: no oral lesions, no sore throat, no difficulty swallowing  Resp: no shortness of breath, no wheezing, no cough  CV: no chest pain, no orthopnea, no paroxysmal nocturnal dyspnea, no lower extremity edema, no palpitations  Abd: no nausea, no heartburn, no diarrhea, no constipation, no abdominal pain  Neuro: no headaches, no syncope or presyncopal episodes  Endo: no polyuria, no polydipsia.     : no hematuria, no dysuria, no frequency, no incontinence  Heme: no lymphadenopathy, no easy bruising or bleeding, no night sweats  MSK: no joint pain or swelling    PE:  Visit Vitals  /76 (BP 1 Location: Left arm, BP Patient Position: Sitting)   Pulse (!) 51   Temp 97.7 °F (36.5 °C) (Oral)   Resp 12   Ht 5' 5\" (1.651 m)   Wt 140 lb (63.5 kg)   SpO2 98%   BMI 23.30 kg/m²     Gen: alert, oriented, no acute distress  Head: normocephalic, atraumatic  Ears: external auditory canals clear, TMs without erythema or effusion  Eyes: pupils equal round reactive to light, sclera clear, conjunctiva clear  Oral: moist mucus membranes, no oral lesions, no pharyngeal inflammation or exudate  Neck: symmetric normal sized thyroid, no carotid bruits, no jugular vein distention  Resp: no increase work of breathing, lungs clear to ausculation bilaterally, no wheezing, rales or rhonchi  CV: S1, S2 normal.  No murmurs, rubs, or gallops. Abd: soft, not tender, not distended. No hepatosplenomegaly. Normal bowel sounds. No hernias. No abdominal or renal bruits. Neuro: cranial nerves intact, normal strength and movement in all extremities, reflexes and sensation intact and symmetric. Skin: no lesion or rash  Extremities: no cyanosis or edema      Assessment/Plan:  Differential diagnosis and treatment options reviewed with patient who is in agreement with treatment plan as outlined below. ICD-10-CM ICD-9-CM    1. Anxiety and depression F41.9 300.00 PARoxetine (PAXIL) 20 mg tablet    F32.9 311    continue paxil at 20 mg daily. 90 day refill sent. She will call if insurance does not pay for it or is too expensive. This drug is on the 4$ list at [facility]. I told her that I would do a prior auth if necessary. Discussed BMI and healthy weight. Encouraged patient to work to implement changes including diet high in raw fruits and vegetables, lean protein and good fats. Limit refined, processed carbohydrates and sugar. Encouraged regular exercise. Recommended regular cardiovascular exercise 3-6 times per week for 30-60 minutes daily. Follow up in three months or sooner if needed. I have discussed the diagnosis with the patient and the intended plan as seen in the above orders. The patient has received an after-visit summary and questions were answered concerning future plans. I have discussed medication side effects and warnings with the patient as well. The patient verbalizes understanding and agreement with the plan.

## 2019-01-03 NOTE — PROGRESS NOTES
Chief Complaint   Patient presents with    Request For New Medication       1. Have you been to the ER, urgent care clinic since your last visit? Hospitalized since your last visit? No    2. Have you seen or consulted any other health care providers outside of the [facility] since your last visit? Include any pap smears or colon screening. No    Pt reports that she got a letter in the mail (around 3 weeks ago) saying that her medication cost was going to increase and she is requesting to be put on something else that is equivalent but is cheaper.

## 2019-01-03 NOTE — PATIENT INSTRUCTIONS
Depression and Chronic Disease: Care Instructions  Your Care Instructions    A chronic disease is one that you have for a long time. Some chronic diseases can be controlled, but they usually cannot be cured. Depression is common in people with chronic diseases, but it often goes unnoticed. Many people have concerns about seeking treatment for a mental health problem. You may think it's a sign of weakness, or you don't want people to know about it. It's important to overcome these reasons for not seeking treatment. Treating depression or anxiety is good for your health. Follow-up care is a key part of your treatment and safety. Be sure to make and go to all appointments, and call your doctor if you are having problems. It's also a good idea to know your test results and keep a list of the medicines you take. How can you care for yourself at home? Watch for symptoms of depression  The symptoms of depression are often subtle at first. You may think they are caused by your disease rather than depression. Or you may think it is normal to be depressed when you have a chronic disease. If you are depressed you may:  · Feel sad or hopeless. · Feel guilty or worthless. · Not enjoy the things you used to enjoy. · Feel hopeless, as though life is not worth living. · Have trouble thinking or remembering. · Have low energy, and you may not eat or sleep well. · Pull away from others. · Think often about death or killing yourself. (Keep the numbers for these national suicide hotlines: 0-543-567-TALK [1-829.624.4740] and 6-762-UVOZALK [1-330.981.7895]. )  Get treatment  By treating your depression, you can feel more hopeful and have more energy. If you feel better, you may take better care of yourself, so your health may improve. · Talk to your doctor if you have any changes in mood during treatment for your disease. · Ask your doctor for help.  Counseling, antidepressant medicine, or a combination of the two can help most people with depression. Often a combination works best. Counseling can also help you cope with having a chronic disease. When should you call for help? Call 911 anytime you think you may need emergency care. For example, call if:    · You feel like hurting yourself or someone else.     · Someone you know has depression and is about to attempt or is attempting suicide.   [facility] your doctor now or seek immediate medical care if:    · You hear voices.     · Someone you know has depression and:  ? Starts to give away his or her possessions. ? Uses illegal drugs or drinks alcohol heavily. ? Talks or writes about death, including writing suicide notes or talking about guns, knives, or pills. ? Starts to spend a lot of time alone. ? Acts very aggressively or suddenly appears calm.    Watch closely for changes in your health, and be sure to contact your doctor if:    · You do not get better as expected. Where can you learn more? Go to http://ivette-nida.info/. Enter N654 in the search box to learn more about \"Depression and Chronic Disease: Care Instructions. \"  Current as of: December 7, 2017  Content Version: 11.8  © 3016-1321 [employer], [employer]orporated. Care instructions adapted under license by [employer] (which disclaims liability or warranty for this information). If you have questions about a medical condition or this instruction, always ask your healthcare professional. [place] [address] any warranty or liability for your use of this information.

## 2019-01-15 RX ORDER — PRAVASTATIN SODIUM 80 MG/1
80 TABLET ORAL
Qty: 90 TAB | Refills: 3 | Status: SHIPPED | OUTPATIENT
Start: 2019-01-15 | End: 2020-01-09 | Stop reason: SDUPTHER

## 2019-01-15 NOTE — TELEPHONE ENCOUNTER
Pharmacy is requesting a refill on med    Requested Prescriptions     Pending Prescriptions Disp Refills    pravastatin (PRAVACHOL) 80 mg tablet 90 Tab 3     Sig: Take 1 Tab by mouth nightly.

## 2019-02-28 NOTE — TELEPHONE ENCOUNTER
Requested Prescriptions     Pending Prescriptions Disp Refills    metoprolol succinate (TOPROL-XL) 50 mg XL tablet 90 Tab 3     Sig: TAKE 1 TABLET BY MOUTH EVERY DAY     Request came from [facility] - INPATIENT

## 2019-03-01 RX ORDER — METOPROLOL SUCCINATE 50 MG/1
TABLET, EXTENDED RELEASE ORAL
Qty: 90 TAB | Refills: 3 | Status: SHIPPED | OUTPATIENT
Start: 2019-03-01 | End: 2020-02-24 | Stop reason: SDUPTHER

## 2019-03-01 NOTE — TELEPHONE ENCOUNTER
Notified Ms. Lisbeth Antoine that RX was sent to [facility].  She voiced understanding

## 2019-04-29 RX ORDER — LISINOPRIL 10 MG/1
TABLET ORAL
Qty: 90 TAB | Refills: 3 | Status: SHIPPED | OUTPATIENT
Start: 2019-04-29 | End: 2020-02-13

## 2019-05-01 ENCOUNTER — HOSPITAL ENCOUNTER (OUTPATIENT)
Dept: LAB | Age: 74
Discharge: HOME | End: 2019-05-01
Payer: MEDICARE

## 2019-05-01 ENCOUNTER — OFFICE VISIT (OUTPATIENT)
Dept: FAMILY MEDICINE CLINIC | Age: 74
End: 2019-05-01

## 2019-05-01 VITALS
WEIGHT: 139.8 LBS | BODY MASS INDEX: 23.29 KG/M2 | OXYGEN SATURATION: 98 % | RESPIRATION RATE: 12 BRPM | HEIGHT: 65 IN | DIASTOLIC BLOOD PRESSURE: 76 MMHG | SYSTOLIC BLOOD PRESSURE: 129 MMHG | TEMPERATURE: 97.4 F | HEART RATE: 52 BPM

## 2019-05-01 DIAGNOSIS — R39.9 UTI SYMPTOMS: Primary | ICD-10-CM

## 2019-05-01 DIAGNOSIS — N30.00 ACUTE CYSTITIS WITHOUT HEMATURIA: ICD-10-CM

## 2019-05-01 LAB
BILIRUB UR QL STRIP: NEGATIVE
GLUCOSE UR-MCNC: NEGATIVE MG/DL
KETONES P FAST UR STRIP-MCNC: NEGATIVE MG/DL
PH UR STRIP: 6 [PH] (ref 4.6–8)
PROT UR QL STRIP: NEGATIVE
SP GR UR STRIP: 1.01 (ref 1–1.03)
UA UROBILINOGEN AMB POC: NORMAL (ref 0.2–1)
URINALYSIS CLARITY POC: CLEAR
URINALYSIS COLOR POC: YELLOW
URINE BLOOD POC: NEGATIVE
URINE LEUKOCYTES POC: NORMAL
URINE NITRITES POC: NEGATIVE

## 2019-05-01 PROCEDURE — 87086 URINE CULTURE/COLONY COUNT: CPT

## 2019-05-01 RX ORDER — NITROFURANTOIN 25; 75 MG/1; MG/1
100 CAPSULE ORAL 2 TIMES DAILY
Qty: 14 CAP | Refills: 0 | Status: SHIPPED | OUTPATIENT
Start: 2019-05-01 | End: 2019-05-08

## 2019-05-01 NOTE — PROGRESS NOTES
Chief Complaint   Patient presents with    Urinary Frequency     X 2 DAYS. C/O FREQUENCY, BURNING    Incontinence     X 2 DAYS     1. Have you been to the ER, urgent care clinic since your last visit? Hospitalized since your last visit? No    2. Have you seen or consulted any other health care providers outside of the [address] [place] [address] since your last visit? Include any pap smears or colon screening. No    Health Maintenance Due   Topic Date Due    Shingrix Vaccine Age 49> (1 of 2) 12/11/1997    Pneumococcal 65+ years (2 of 2 - PCV13) 05/21/2015    GLAUCOMA SCREENING Q2Y  11/21/2015    MEDICARE YEARLY EXAM  02/22/2019    BREAST CANCER SCRN MAMMOGRAM  06/28/2019       Do you have an [address] in place in the event that you have a healthcare crisis that could impact your decision making as it pertains to your health? NO    Would you like information about Advance Care Planning? NO    Information given.  NO

## 2019-05-01 NOTE — PATIENT INSTRUCTIONS
Urinary Tract Infection in Women: Care Instructions  Your Care Instructions    A urinary tract infection, or UTI, is a general term for an infection anywhere between the kidneys and the urethra (where urine comes out). Most UTIs are bladder infections. They often cause pain or burning when you urinate. UTIs are caused by bacteria and can be cured with antibiotics. Be sure to complete your treatment so that the infection goes away. Follow-up care is a key part of your treatment and safety. Be sure to make and go to all appointments, and call your doctor if you are having problems. It's also a good idea to know your test results and keep a list of the medicines you take. How can you care for yourself at home? · Take your antibiotics as directed. Do not stop taking them just because you feel better. You need to take the full course of antibiotics. · Drink extra water and other fluids for the next day or two. This may help wash out the bacteria that are causing the infection. (If you have kidney, heart, or liver disease and have to limit fluids, talk with your doctor before you increase your fluid intake.)  · Avoid drinks that are carbonated or have caffeine. They can irritate the bladder. · Urinate often. Try to empty your bladder each time. · To relieve pain, take a hot bath or lay a heating pad set on low over your lower belly or genital area. Never go to sleep with a heating pad in place. To prevent UTIs  · Drink plenty of water each day. This helps you urinate often, which clears bacteria from your system. (If you have kidney, heart, or liver disease and have to limit fluids, talk with your doctor before you increase your fluid intake.)  · Urinate when you need to. · Urinate right after you have sex. · Change sanitary pads often. · Avoid douches, bubble baths, feminine hygiene sprays, and other feminine hygiene products that have deodorants.   · After going to the bathroom, wipe from front to back.  When should you call for help? Call your doctor now or seek immediate medical care if:    · Symptoms such as fever, chills, nausea, or vomiting get worse or appear for the first time.     · You have new pain in your back just below your rib cage. This is called flank pain.     · There is new blood or pus in your urine.     · You have any problems with your antibiotic medicine.    Watch closely for changes in your health, and be sure to contact your doctor if:    · You are not getting better after taking an antibiotic for 2 days.     · Your symptoms go away but then come back. Where can you learn more? Go to http://ivette-nida.info/. Enter R764 in the search box to learn more about \"Urinary Tract Infection in Women: Care Instructions. \"  Current as of: March 20, 2018  Content Version: 11.9  © 3344-9951 [employer]. Care instructions adapted under license by [employer] (which disclaims liability or warranty for this information). If you have questions about a medical condition or this instruction, always ask your healthcare professional. [address] any warranty or liability for your use of this information.

## 2019-05-01 NOTE — PROGRESS NOTES
Chief Complaint   Patient presents with    Urinary Frequency     X 2 DAYS. C/O FREQUENCY, BURNING    Incontinence     X 2 DAYS       Subjective:      Shanna Parekh is a 72 y.o. female that presents today with a chief complaint of dysuria. The patient admits to accompanying urgency, frequency, suprapubic pain and some incontinence and feels really tired. She says symptoms started within the last week. Started taking Azo for the past week which has helped. The patient denies nausea, vomiting, and fever. Denies hematuria. The patient has history of UTIs but has not had UTI since 8/18    Past Medical History:   Diagnosis Date    Back pain     CAD (coronary artery disease)     CABG, [place]    Dry eye     Hypercholesterolemia     Hypertension      Current Outpatient Medications   Medication Sig    vit A/vit C/vit E/zinc/copper (PRESERVISION AREDS PO) Take  by mouth.  lisinopril (PRINIVIL, ZESTRIL) 10 mg tablet TAKE 1 TABLET BY MOUTH DAILY    metoprolol succinate (TOPROL-XL) 50 mg XL tablet TAKE 1 TABLET BY MOUTH EVERY DAY    pravastatin (PRAVACHOL) 80 mg tablet Take 1 Tab by mouth nightly.  PARoxetine (PAXIL) 20 mg tablet Take 1 Tab by mouth daily.  calcium carbonate (CALTREX) 600 mg calcium (1,500 mg) tablet Take 600 mg by mouth two (2) times a day.  fish oil-omega-3 fatty acids (FISH OIL) 340-1,000 mg capsule Take 1 Cap by mouth two (2) times a day.  aspirin (SINDY CHILDRENS ASPIRIN) 81 mg chewable tablet Take 81 mg by mouth daily.  cholecalciferol, vitamin d3, (VITAMIN D) 1,000 unit tablet Take 1,000 Units by mouth daily. No current facility-administered medications for this visit.       Allergies   Allergen Reactions    Amoxicillin Rash    Amoxicillin Rash    Estrogens, Conjugated Other (comments)    Sulfa (Sulfonamide Antibiotics) Nausea Only     Social History     Tobacco Use    Smoking status: Never Smoker    Smokeless tobacco: Never Used   Substance Use Topics    Alcohol use: Yes     Comment: occ    Drug use: No       ROS per HPI. Objective:     Visit Vitals  /76 (BP 1 Location: Left arm, BP Patient Position: Sitting)   Pulse (!) 52   Temp 97.4 °F (36.3 °C) (Oral)   Resp 12   Ht 5' 5\" (1.651 m)   Wt 139 lb 12.8 oz (63.4 kg)   SpO2 98%   BMI 23.26 kg/m²       Skin: no pallor, normal turgor  HEENT:  Normocephalic, no conjunctival inflammation, pupils equal round and reactive to light, MMM, no oral lesions  Heart: regular rate and rhythm, no murmurs, rubs or gallops  Lungs: no increased respiratory effort, clear to ausculation bilaterally  Abdomen: soft, mild suprapubic tenderness, not distended. Normal bowel sounds. No rebound or guarding. No bruits. Back: no costovertebral angle tenderness  Extremities:no edema or cyanosis  Neuro awake, alert and oriented          Assessment/Plan:   Differential diagnosis and treatment options reviewed with patient who is in agreement with treatment plan as outlined below. ICD-10-CM ICD-9-CM    1. UTI symptoms R39.9 788.99 AMB POC URINALYSIS DIP STICK AUTO W/O MICRO   2. Acute cystitis without hematuria N30.00 595.0 CULTURE, URINE      nitrofurantoin, macrocrystal-monohydrate, (MACROBID) 100 mg capsule     Will treat for UTI today. Antibiotic discussed. D/c use of Azo for now. General instruction:  1. Drink plenty of fluids. 2. Follow-up if symptoms not improving in 2-3 days. 3. If you develop fever, abdominal pain, back pain, or nausea and vomiting then return to clinic or go to the emergency room. This could indicate that you have a more serious infection or and infection in the kidneys. Follow up one month or sooner if needed. Will do medicare wellness at that time. Will call with  results. Verbal and written instructions (see AVS) provided. Patient expresses understanding of diagnosis and treatment plan.

## 2019-05-03 LAB — BACTERIA UR CULT: NO GROWTH

## 2019-05-03 NOTE — PROGRESS NOTES
UC negative for infection. Can stop antibiotic after she has taken three days. Is she feeling better?

## 2019-05-03 NOTE — PROGRESS NOTES
Contacted pt and verified name and . Informed pt of results, pt verbalized understanding, no questions at this time. Pt voiced that she is feeling much better.

## 2019-07-01 DIAGNOSIS — F32.A ANXIETY AND DEPRESSION: ICD-10-CM

## 2019-07-01 DIAGNOSIS — F41.9 ANXIETY AND DEPRESSION: ICD-10-CM

## 2019-07-01 RX ORDER — PAROXETINE HYDROCHLORIDE 20 MG/1
20 TABLET, FILM COATED ORAL DAILY
Qty: 90 TAB | Refills: 1 | Status: SHIPPED | OUTPATIENT
Start: 2019-07-01 | End: 2019-12-30 | Stop reason: SDUPTHER

## 2019-07-01 NOTE — TELEPHONE ENCOUNTER
Requested Prescriptions     Pending Prescriptions Disp Refills    PARoxetine (PAXIL) 20 mg tablet 90 Tab 1     Sig: Take 1 Tab by mouth daily. Requested by pharmacy.

## 2019-07-05 ENCOUNTER — TELEPHONE (OUTPATIENT)
Dept: FAMILY MEDICINE CLINIC | Age: 74
End: 2019-07-05

## 2019-07-05 NOTE — TELEPHONE ENCOUNTER
Called pt and verified name and . Pt voiced that she had a sore neck, and it was red. Pt voiced that she hasn't been in the sun, feels fine. Denies nausea, chest pain, sob, and fever. Voiced to pt that we had no appointments today, but she could go to UC or come in next week. Scheduled pt to come in next week, but encouraged pt to go be evaluated today. She voiced understanding. Pt voiced that she will call back and cancel appointment if she can't get in today with UC.

## 2019-07-05 NOTE — TELEPHONE ENCOUNTER
From [facility]:  Pt is requesting an appointment today for a sore neck and red neck. Pts number is [phone].

## 2019-07-09 ENCOUNTER — OFFICE VISIT (OUTPATIENT)
Dept: FAMILY MEDICINE CLINIC | Age: 74
End: 2019-07-09

## 2019-07-09 VITALS
HEART RATE: 60 BPM | RESPIRATION RATE: 18 BRPM | TEMPERATURE: 98.1 F | BODY MASS INDEX: 22.99 KG/M2 | DIASTOLIC BLOOD PRESSURE: 75 MMHG | WEIGHT: 138 LBS | OXYGEN SATURATION: 97 % | SYSTOLIC BLOOD PRESSURE: 120 MMHG | HEIGHT: 65 IN

## 2019-07-09 DIAGNOSIS — L25.9 CONTACT DERMATITIS, UNSPECIFIED CONTACT DERMATITIS TYPE, UNSPECIFIED TRIGGER: Primary | ICD-10-CM

## 2019-07-09 DIAGNOSIS — E55.9 VITAMIN D DEFICIENCY: ICD-10-CM

## 2019-07-09 DIAGNOSIS — R79.9 ABNORMAL FINDING OF BLOOD CHEMISTRY: ICD-10-CM

## 2019-07-09 DIAGNOSIS — Z00.00 MEDICARE ANNUAL WELLNESS VISIT, SUBSEQUENT: ICD-10-CM

## 2019-07-09 DIAGNOSIS — I10 ESSENTIAL HYPERTENSION: ICD-10-CM

## 2019-07-09 DIAGNOSIS — E78.2 MIXED HYPERLIPIDEMIA: ICD-10-CM

## 2019-07-09 DIAGNOSIS — Z13.39 SCREENING FOR ALCOHOLISM: ICD-10-CM

## 2019-07-09 RX ORDER — PREDNISONE 5 MG/1
TABLET ORAL
Qty: 21 TAB | Refills: 0 | Status: SHIPPED | OUTPATIENT
Start: 2019-07-09 | End: 2019-09-11 | Stop reason: ALTCHOICE

## 2019-07-09 NOTE — PROGRESS NOTES
Chief Complaint   Patient presents with    Rash     on neck and chest     Skin Problem     on neck and chest      Pt reports that she developed an itchy rash on her neck and chest on the morning on July 3rd. Pt was seen at urgent care on July 5th and given triamcinolone cream.     Pt reports that the rash is a little better but is now peeling and still \"feels like sandpaper\"    Pt is scheduled for a mammogram this week. Subjective: (As above and below)     Chief Complaint   Patient presents with    Rash     on neck and chest     Skin Problem     on neck and chest      she is a 72y.o. year old female who presents for evaluation. Reviewed PmHx, RxHx, FmHx, SocHx, AllgHx and updated in chart. Review of Systems - negative except as listed above    Objective:     Vitals:    07/09/19 0830   BP: 120/75   Pulse: 60   Resp: 18   Temp: 98.1 °F (36.7 °C)   TempSrc: Oral   SpO2: 97%   Weight: 138 lb (62.6 kg)   Height: 5' 5\" (1.651 m)     Physical Examination: General appearance - alert, well appearing, and in no distress  Mental status - normal mood, behavior, speech, dress, motor activity, and thought processes  Mouth - mucous membranes moist, pharynx normal without lesions  Chest - clear to auscultation, no wheezes, rales or rhonchi, symmetric air entry  Heart - normal rate, regular rhythm, normal S1, S2, no murmurs, rubs, clicks or gallops  Musculoskeletal - no joint tenderness, deformity or swelling  Extremities - peripheral pulses normal, no pedal edema, no clubbing or cyanosis  Skin - sandpapery mildly ertyhematou s rash on anterior neck and chest in distribution of exposed skin and tan pattern    Assessment/ Plan:   1. Contact dermatitis, unspecified contact dermatitis type, unspecified trigger  -take prednisone for 6 days, continue to use triamcinolone cream    2. Mixed hyperlipidemia  -check labs     3. Essential hypertension  -well controlled    4.  Medicare annual wellness visit, subsequent  -see below    5. Screening for alcoholism  - DE ANNUAL ALCOHOL SCREEN 15 MIN     Follow-up and Dispositions    · Return in about 1 year (around 7/9/2020), or if symptoms worsen or fail to improve. I have discussed the diagnosis with the patient and the intended plan as seen in the above orders. The patient has received an after-visit summary and questions were answered concerning future plans. Medication Side Effects and Warnings were discussed with patient: yes  Patient Labs were reviewed: yes  Patient Past Records were reviewed:  yes    Felecia Jiang M.D. This is the Subsequent Medicare Annual Wellness Exam, performed 12 months or more after the Initial AWV or the last Subsequent AWV    I have reviewed the patient's medical history in detail and updated the computerized patient record. History     Past Medical History:   Diagnosis Date    Back pain     CAD (coronary artery disease)     CABG, [place]    Dry eye     Hypercholesterolemia     Hypertension       Past Surgical History:   Procedure Laterality Date    HX CORONARY ARTERY BYPASS GRAFT  2002    HX GYN      HX TOTAL VAGINAL HYSTERECTOMY  2014     Current Outpatient Medications   Medication Sig Dispense Refill    predniSONE (STERAPRED) 5 mg dose pack See administration instruction per 5mg dose pack 21 Tab 0    PARoxetine (PAXIL) 20 mg tablet Take 1 Tab by mouth daily. 90 Tab 1    vit A/vit C/vit E/zinc/copper (PRESERVISION AREDS PO) Take  by mouth.  lisinopril (PRINIVIL, ZESTRIL) 10 mg tablet TAKE 1 TABLET BY MOUTH DAILY 90 Tab 3    metoprolol succinate (TOPROL-XL) 50 mg XL tablet TAKE 1 TABLET BY MOUTH EVERY DAY 90 Tab 3    pravastatin (PRAVACHOL) 80 mg tablet Take 1 Tab by mouth nightly. 90 Tab 3    calcium carbonate (CALTREX) 600 mg calcium (1,500 mg) tablet Take 600 mg by mouth two (2) times a day.  fish oil-omega-3 fatty acids (FISH OIL) 340-1,000 mg capsule Take 1 Cap by mouth two (2) times a day.       aspirin (SINDY CHILDRENS ASPIRIN) 81 mg chewable tablet Take 81 mg by mouth daily.  cholecalciferol, vitamin d3, (VITAMIN D) 1,000 unit tablet Take 1,000 Units by mouth daily. Allergies   Allergen Reactions    Amoxicillin Rash    Amoxicillin Rash    Estrogens, Conjugated Other (comments)    Sulfa (Sulfonamide Antibiotics) Nausea Only     Family History   Problem Relation Age of Onset    Heart Attack Mother     Heart Disease Mother     Heart Disease Father     Heart Disease Sister     Emphysema Sister      Social History     Tobacco Use    Smoking status: Never Smoker    Smokeless tobacco: Never Used   Substance Use Topics    Alcohol use: Yes     Comment: occ     Patient Active Problem List   Diagnosis Code    Hyperlipidemia E78.5    CAD (coronary artery disease) I25.10    Hypertension I10    GERD (gastroesophageal reflux disease) K21.9    S/P CABG x 1 Z95.1       Depression Risk Factor Screening:     3 most recent PHQ Screens 5/1/2019   PHQ Not Done -   Little interest or pleasure in doing things Not at all   Feeling down, depressed, irritable, or hopeless Not at all   Total Score PHQ 2 0     Alcohol Risk Factor Screening: You do not drink alcohol or very rarely. Functional Ability and Level of Safety:   Hearing Loss  Hearing is good. Activities of Daily Living  The home contains: no safety equipment. Patient does total self care    Fall Risk  Fall Risk Assessment, last 12 mths 5/1/2019   Able to walk? Yes   Fall in past 12 months?  No       Abuse Screen  Patient is not abused    Cognitive Screening   Evaluation of Cognitive Function:  Has your family/caregiver stated any concerns about your memory: no  Normal    Patient Care Team   Patient Care Team:  Azalia Martins NP as PCP - General (Pediatrics)  Maggie Moreno MD (Cardiology)    Assessment/Plan   Education and counseling provided:  Are appropriate based on today's review and evaluation    Diagnoses and all orders for this visit: 1. Contact dermatitis, unspecified contact dermatitis type, unspecified trigger    2. Mixed hyperlipidemia  -     METABOLIC PANEL, COMPREHENSIVE  -     LIPID PANEL    3. Essential hypertension  -     CBC W/O DIFF  -     HEMOGLOBIN A1C WITH EAG  -     TSH 3RD GENERATION  -     VITAMIN D, 25 HYDROXY    4. Medicare annual wellness visit, subsequent    5. Screening for alcoholism  -     ME ANNUAL ALCOHOL SCREEN 15 MIN    6. Abnormal finding of blood chemistry   -     HEMOGLOBIN A1C WITH EAG    7.  Vitamin D deficiency   -     VITAMIN D, 25 HYDROXY    Other orders  -     predniSONE (STERAPRED) 5 mg dose pack; See administration instruction per 5mg dose pack        Health Maintenance Due   Topic Date Due    MEDICARE YEARLY EXAM  02/22/2019    BREAST CANCER SCRN MAMMOGRAM  06/28/2019

## 2019-07-09 NOTE — PROGRESS NOTES
Chief Complaint   Patient presents with    Rash     on neck and chest     Skin Problem     on neck and chest      Health Maintenance reviewed     1. Have you been to the ER, urgent care clinic since your last visit? Hospitalized since your last visit? Yes, [facility] last Friday      2. Have you seen or consulted any other health care providers outside of the [address] [place] [address] since your last visit? Include any pap smears or colon screening.  no

## 2019-07-11 LAB — MAMMOGRAPHY, EXTERNAL: NORMAL

## 2019-07-16 ENCOUNTER — HOSPITAL ENCOUNTER (OUTPATIENT)
Dept: LAB | Age: 74
Discharge: HOME | End: 2019-07-16
Payer: MEDICARE

## 2019-07-16 ENCOUNTER — LAB ONLY (OUTPATIENT)
Dept: FAMILY MEDICINE CLINIC | Age: 74
End: 2019-07-16

## 2019-07-16 DIAGNOSIS — R79.9 ABNORMAL FINDING OF BLOOD CHEMISTRY: ICD-10-CM

## 2019-07-16 DIAGNOSIS — E78.2 MIXED HYPERLIPIDEMIA: ICD-10-CM

## 2019-07-16 DIAGNOSIS — E55.9 VITAMIN D DEFICIENCY: ICD-10-CM

## 2019-07-16 DIAGNOSIS — I10 ESSENTIAL HYPERTENSION: ICD-10-CM

## 2019-07-16 PROCEDURE — 83036 HEMOGLOBIN GLYCOSYLATED A1C: CPT

## 2019-07-16 PROCEDURE — 82306 VITAMIN D 25 HYDROXY: CPT

## 2019-07-16 PROCEDURE — 80053 COMPREHEN METABOLIC PANEL: CPT

## 2019-07-16 PROCEDURE — 36415 COLL VENOUS BLD VENIPUNCTURE: CPT

## 2019-07-16 PROCEDURE — 84443 ASSAY THYROID STIM HORMONE: CPT

## 2019-07-16 PROCEDURE — 85027 COMPLETE CBC AUTOMATED: CPT

## 2019-07-16 PROCEDURE — 80061 LIPID PANEL: CPT

## 2019-07-17 LAB
25(OH)D3+25(OH)D2 SERPL-MCNC: 46.8 NG/ML (ref 30–100)
ALBUMIN SERPL-MCNC: 4.6 G/DL (ref 3.5–4.8)
ALBUMIN/GLOB SERPL: 2.6 {RATIO} (ref 1.2–2.2)
ALP SERPL-CCNC: 80 IU/L (ref 39–117)
ALT SERPL-CCNC: 13 IU/L (ref 0–32)
AST SERPL-CCNC: 16 IU/L (ref 0–40)
BILIRUB SERPL-MCNC: 0.3 MG/DL (ref 0–1.2)
BUN SERPL-MCNC: 14 MG/DL (ref 8–27)
BUN/CREAT SERPL: 21 (ref 12–28)
CALCIUM SERPL-MCNC: 9.4 MG/DL (ref 8.7–10.3)
CHLORIDE SERPL-SCNC: 103 MMOL/L (ref 96–106)
CHOLEST SERPL-MCNC: 172 MG/DL (ref 100–199)
CO2 SERPL-SCNC: 20 MMOL/L (ref 20–29)
CREAT SERPL-MCNC: 0.68 MG/DL (ref 0.57–1)
ERYTHROCYTE [DISTWIDTH] IN BLOOD BY AUTOMATED COUNT: 13.2 % (ref 12.3–15.4)
EST. AVERAGE GLUCOSE BLD GHB EST-MCNC: 108 MG/DL
GLOBULIN SER CALC-MCNC: 1.8 G/DL (ref 1.5–4.5)
GLUCOSE SERPL-MCNC: 107 MG/DL (ref 65–99)
HBA1C MFR BLD: 5.4 % (ref 4.8–5.6)
HCT VFR BLD AUTO: 37.1 % (ref 34–46.6)
HDLC SERPL-MCNC: 93 MG/DL
HGB BLD-MCNC: 12.3 G/DL (ref 11.1–15.9)
INTERPRETATION, 910389: NORMAL
LDLC SERPL CALC-MCNC: 65 MG/DL (ref 0–99)
MCH RBC QN AUTO: 30.8 PG (ref 26.6–33)
MCHC RBC AUTO-ENTMCNC: 33.2 G/DL (ref 31.5–35.7)
MCV RBC AUTO: 93 FL (ref 79–97)
PLATELET # BLD AUTO: 247 X10E3/UL (ref 150–450)
POTASSIUM SERPL-SCNC: 4.5 MMOL/L (ref 3.5–5.2)
PROT SERPL-MCNC: 6.4 G/DL (ref 6–8.5)
RBC # BLD AUTO: 4 X10E6/UL (ref 3.77–5.28)
SODIUM SERPL-SCNC: 144 MMOL/L (ref 134–144)
TRIGL SERPL-MCNC: 72 MG/DL (ref 0–149)
TSH SERPL DL<=0.005 MIU/L-ACNC: 0.65 UIU/ML (ref 0.45–4.5)
VLDLC SERPL CALC-MCNC: 14 MG/DL (ref 5–40)
WBC # BLD AUTO: 8.2 X10E3/UL (ref 3.4–10.8)

## 2019-09-11 ENCOUNTER — OFFICE VISIT (OUTPATIENT)
Dept: FAMILY MEDICINE CLINIC | Age: 74
End: 2019-09-11

## 2019-09-11 VITALS
BODY MASS INDEX: 23.29 KG/M2 | WEIGHT: 139.8 LBS | HEIGHT: 65 IN | RESPIRATION RATE: 16 BRPM | OXYGEN SATURATION: 97 % | TEMPERATURE: 97.8 F | SYSTOLIC BLOOD PRESSURE: 132 MMHG | DIASTOLIC BLOOD PRESSURE: 66 MMHG | HEART RATE: 56 BPM

## 2019-09-11 DIAGNOSIS — R05.9 COUGH: Primary | ICD-10-CM

## 2019-09-11 DIAGNOSIS — K21.9 GASTROESOPHAGEAL REFLUX DISEASE WITHOUT ESOPHAGITIS: ICD-10-CM

## 2019-09-11 DIAGNOSIS — R09.82 POST-NASAL DRIP: ICD-10-CM

## 2019-09-11 RX ORDER — BENZONATATE 200 MG/1
200 CAPSULE ORAL
Qty: 30 CAP | Refills: 1 | Status: SHIPPED | OUTPATIENT
Start: 2019-09-11 | End: 2019-12-26 | Stop reason: SDUPTHER

## 2019-09-11 RX ORDER — FLUTICASONE PROPIONATE 50 MCG
1 SPRAY, SUSPENSION (ML) NASAL 2 TIMES DAILY
Qty: 1 BOTTLE | Refills: 1 | Status: SHIPPED | OUTPATIENT
Start: 2019-09-11 | End: 2020-04-21 | Stop reason: ALTCHOICE

## 2019-09-11 RX ORDER — FAMOTIDINE 20 MG/1
20 TABLET, FILM COATED ORAL 2 TIMES DAILY
Qty: 60 TAB | Refills: 0
Start: 2019-09-11 | End: 2020-04-02 | Stop reason: ALTCHOICE

## 2019-09-11 NOTE — PROGRESS NOTES
Subjective:     Chief Complaint   Patient presents with    Cough     non productive        Anshul Denson is a 72 y.o. female who complains of dry cough for \"3-4 months\". Worse at night, when she lays down. Feels like a tickle in her throat causing it. Cough is non productive. No nasal congestion, no wheezing, no sore throat. Feels hoarse after coughing a lot sometimes. Has not Tried OTC cold remedies with temporary relief. Has used cough drops which helps some  Patient does not smoke cigarettes. She has no history of asthma. She does admit that she feels a sour taste in her mouth at times, has not taken anything for GERD  She has been on lisinopril for several years. She has not had any environmental changes    Denies cardiac complaints including chest pain or discomfort, elevated heart rate, or palpitations. Denies respiratory complaints including SOB, difficulty or pain with breathing, wheezes. Denies fever, myalgias, chills, weakness, fatigue and other systemic symptoms. No N/V/D  ROS is otherwise negative. No evaluation to date. No recent travel. Sick Contacts? None     Chart reviewed: immunizations are up to date and documented. Past Medical History:   Diagnosis Date    Back pain     CAD (coronary artery disease)     CABG, [place]    Dry eye     Hypercholesterolemia     Hypertension      Social History     Tobacco Use    Smoking status: Never Smoker    Smokeless tobacco: Never Used   Substance Use Topics    Alcohol use: Yes     Comment: occ    Drug use: No     Current Outpatient Medications on File Prior to Visit   Medication Sig Dispense Refill    PARoxetine (PAXIL) 20 mg tablet Take 1 Tab by mouth daily. 90 Tab 1    vit A/vit C/vit E/zinc/copper (PRESERVISION AREDS PO) Take  by mouth.       lisinopril (PRINIVIL, ZESTRIL) 10 mg tablet TAKE 1 TABLET BY MOUTH DAILY 90 Tab 3    metoprolol succinate (TOPROL-XL) 50 mg XL tablet TAKE 1 TABLET BY MOUTH EVERY DAY 90 Tab 3    pravastatin (PRAVACHOL) 80 mg tablet Take 1 Tab by mouth nightly. 90 Tab 3    calcium carbonate (CALTREX) 600 mg calcium (1,500 mg) tablet Take 600 mg by mouth two (2) times a day.  fish oil-omega-3 fatty acids (FISH OIL) 340-1,000 mg capsule Take 1 Cap by mouth two (2) times a day.  aspirin (SINDY CHILDRENS ASPIRIN) 81 mg chewable tablet Take 81 mg by mouth daily.  cholecalciferol, vitamin d3, (VITAMIN D) 1,000 unit tablet Take 1,000 Units by mouth daily. No current facility-administered medications on file prior to visit. Allergies   Allergen Reactions    Amoxicillin Rash    Amoxicillin Rash    Estrogens, Conjugated Other (comments)    Sulfa (Sulfonamide Antibiotics) Nausea Only        Review of Systems  Pertinent items are noted in HPI. Agree with nurses note. OBJECTIVE:   Visit Vitals  /66 (BP 1 Location: Left arm, BP Patient Position: Sitting)   Pulse (!) 56   Temp 97.8 °F (36.6 °C) (Oral)   Resp 16   Ht 5' 5\" (1.651 m)   Wt 139 lb 12.8 oz (63.4 kg)   LMP  (LMP Unknown)   SpO2 97%   BMI 23.26 kg/m²     She appears well, vital signs are as noted above   PAIN: No complaints of pain today. HEAD:  Normocephalic. Atraumatic. Non tender sinuses x 4. EYE: PERRLA. EOMs intact. Sclera anicteric without injection. No drainage or discharge. EARS: Hearing intact bilaterally. External ear canals normal without evidence of blood or swelling. Bilateral TM's intact, pearly grey with landmarks visible. No erythema or effusion. NOSE: Patent. Nasal turbinates pink. No polyps noted. No erythema. No discharge. MOUTH: mucous membranes pink and moist. Posterior pharynx normal with cobblestone appearance. No erythema, white exudate or obstruction. NECK: supple. Midline trachea. RESP: Breath sounds are symmetrical bilaterally. Unlabored without SOB. Speaking in full sentences. Clear to auscultation bilaterally anteriorly and posteriorly. No wheezes. No rales or rhonchi. Non-productive cough when elicited. CV: normal rate. Regular rhythm. S1, S2 audible. No murmur noted. No rubs, clicks or gallops noted. HEME/LYMPH: peripheral pulses palpable 2+ x 4 extremities. No peripheral edema is noted. No cervical adenopathy noted. SKIN: clean dry and intact throughout. no rashes      No results found for this visit on 09/11/19. Assessment/Plan:   Differential diagnosis and treatment options reviewed with patient who is in agreement with treatment plan as outlined below. ICD-10-CM ICD-9-CM    1. Cough R05 786.2 XR CHEST PA LAT      fluticasone propionate (FLONASE) 50 mcg/actuation nasal spray      benzonatate (TESSALON) 200 mg capsule   2. Post-nasal drip R09.82 784.91 fluticasone propionate (FLONASE) 50 mcg/actuation nasal spray   3. Gastroesophageal reflux disease without esophagitis K21.9 530.81 famotidine (PEPCID) 20 mg tablet   Symptomatic therapy suggested: push fluids, rest, gargle warm salt water and apply heat to sinuses prn. Lack of antibiotic effectiveness discussed with her. OTC nasal saline spray  2-3 sprays per nostril 2-4 times a day to clear eustachian tube and assist with post nasal drip symptoms. OTC antihistamines (Zyrtec or Claritin)  to reduce allergic symptoms such as sneezing, runny nose and itchy eyes. Lung sounds are clear. Will treat for potential allergic cause vs GERD cause. Start flonase and Pepcid and saline flushes. Tessalon PRN cough. If no improvement she will go get CXR. CTA lungs in November 2018 clear. If no improvement in 2 weeks, she will call me and let me know. Would consider ACE as cause of cough and potentially change her BP medications. Verbal and written instructions (see AVS) provided. Patient expresses understanding and agreement of diagnosis and treatment plan.     F/u if symptoms do not improve or worsen

## 2019-09-11 NOTE — PROGRESS NOTES
Chief Complaint   Patient presents with    Cough     non productive        1. Have you been to the ER, urgent care clinic since your last visit? Hospitalized since your last visit? No    2. Have you seen or consulted any other health care providers outside of the [address] [place] [address] since your last visit? Include any pap smears or colon screening. No    Health maintenance reviewed. Pt informed of health maintenance past due and/or upcoming. Pt verbalized understanding. Pt has tried cough drops but nothing otc, states that she has had the cough for months.

## 2019-09-11 NOTE — PATIENT INSTRUCTIONS
Gastroesophageal Reflux Disease (GERD): Care Instructions  Your Care Instructions    Gastroesophageal reflux disease (GERD) is the backward flow of stomach acid into the esophagus. The esophagus is the tube that leads from your throat to your stomach. A one-way valve prevents the stomach acid from moving up into this tube. When you have GERD, this valve does not close tightly enough. If you have mild GERD symptoms including heartburn, you may be able to control the problem with antacids or over-the-counter medicine. Changing your diet, losing weight, and making other lifestyle changes can also help reduce symptoms. Follow-up care is a key part of your treatment and safety. Be sure to make and go to all appointments, and call your doctor if you are having problems. It's also a good idea to know your test results and keep a list of the medicines you take. How can you care for yourself at home? · Take your medicines exactly as prescribed. Call your doctor if you think you are having a problem with your medicine. · Your doctor may recommend over-the-counter medicine. For mild or occasional indigestion, antacids, such as Tums, Gaviscon, Mylanta, or Maalox, may help. Your doctor also may recommend over-the-counter acid reducers, such as Pepcid AC, Tagamet HB, Zantac 75, or Prilosec. Read and follow all instructions on the label. If you use these medicines often, talk with your doctor. · Change your eating habits. ? It's best to eat several small meals instead of two or three large meals. ? After you eat, wait 2 to 3 hours before you lie down. ? Chocolate, mint, and alcohol can make GERD worse. ? Spicy foods, foods that have a lot of acid (like tomatoes and oranges), and coffee can make GERD symptoms worse in some people. If your symptoms are worse after you eat a certain food, you may want to stop eating that food to see if your symptoms get better.   · Do not smoke or chew tobacco. Smoking can make GERD worse. If you need help quitting, talk to your doctor about stop-smoking programs and medicines. These can increase your chances of quitting for good. · If you have GERD symptoms at night, raise the head of your bed 6 to 8 inches by putting the frame on blocks or placing a foam wedge under the head of your mattress. (Adding extra pillows does not work.)  · Do not wear tight clothing around your middle. · Lose weight if you need to. Losing just 5 to 10 pounds can help. When should you call for help? Call your doctor now or seek immediate medical care if:    · You have new or different belly pain.     · Your stools are black and tarlike or have streaks of blood.    Watch closely for changes in your health, and be sure to contact your doctor if:    · Your symptoms have not improved after 2 days.     · Food seems to catch in your throat or chest.   Where can you learn more? Go to http://ivette-nida.info/. Enter N557 in the search box to learn more about \"Gastroesophageal Reflux Disease (GERD): Care Instructions. \"  Current as of: November 7, 2018  Content Version: 12.1  © 9340-7353 [employer]. Care instructions adapted under license by [employer] (which disclaims liability or warranty for this information). If you have questions about a medical condition or this instruction, always ask your healthcare professional. [address] any warranty or liability for your use of this information. Chronic Cough: Care Instructions  Your Care Instructions    A cough is your body's response to something that bothers your throat or airways. Many things can cause a cough. You might cough because of a cold or the flu, bronchitis, or asthma. Smoking, postnasal drip, allergies, and stomach acid that backs up into your throat also can cause a cough. A cough can be short-term (acute) or long-term (chronic). A chronic cough lasts more than 3 weeks.  A chronic cough is often caused by a long-term problem, such as asthma. Another cause might be a medicine, such as an ACE inhibitor. A cough is a symptom, not a disease. To treat a chronic cough, you may need to treat the problem that causes it. You can take a few steps at home to cough less and feel better. Some people cough or clear their throat out of habit for no clear reason. Follow-up care is a key part of your treatment and safety. Be sure to make and go to all appointments, and call your doctor if you are having problems. It's also a good idea to know your test results and keep a list of the medicines you take. How can you care for yourself at home? · Drink plenty of water and other fluids. This may help soothe a dry or sore throat. Honey or lemon juice in hot water or tea may ease a dry cough. · Prop up your head on pillows to help you breathe and ease a cough. · Do not smoke or allow others to smoke around you. Smoke can make a cough worse. If you need help quitting, talk to your doctor about stop-smoking programs and medicines. These can increase your chances of quitting for good. · Avoid exposure to smoke, dust, or other pollutants, or wear a face mask. Check with your doctor or pharmacist to find out which type of face mask will give you the most benefit. · Take cough medicine as directed by your doctor. · Try cough drops to soothe a dry or sore throat. Cough drops don't stop a cough. Medicine-flavored cough drops are no better than candy-flavored drops or hard candy. Throat clearing  When you have a chronic cough or a disease that may cause this type of cough, you may often feel like you want to clear your throat. This helps bring up mucus. But throat clearing does not always have a cause. Throat clearing can become a habit. The more you do it, the more you feel like you need to do it. But frequent throat clearing can be hard on your vocal cords. It's like slamming them together.   To help lessen throat clearing, you can try:  · Taking small sips of water. · Not clearing your throat when you feel you need to. · Swallowing hard when you want to clear your throat. You may want to ask your doctor if a medicine that thins mucus would help. When should you call for help? Call 911 anytime you think you may need emergency care. For example, call if:    · You have severe trouble breathing.    Call your doctor now or seek immediate medical care if:    · You cough up blood.     · You have new or worse trouble breathing.     · You have a new or higher fever.    Watch closely for changes in your health, and be sure to contact your doctor if:    · You cough more deeply or more often, especially if you notice more mucus or a change in the color of your mucus.     · You do not get better as expected. Where can you learn more? Go to http://ivette-nida.info/. Enter O114 in the search box to learn more about \"Chronic Cough: Care Instructions. \"  Current as of: September 5, 2018  Content Version: 12.1  © 6882-1421 [employer]. Care instructions adapted under license by [employer] (which disclaims liability or warranty for this information). If you have questions about a medical condition or this instruction, always ask your healthcare professional. [address] any warranty or liability for your use of this information.

## 2019-10-02 ENCOUNTER — HOSPITAL ENCOUNTER (OUTPATIENT)
Dept: GENERAL RADIOLOGY | Age: 74
Discharge: HOME | End: 2019-10-02
Payer: MEDICARE

## 2019-10-02 DIAGNOSIS — R05.9 COUGH: ICD-10-CM

## 2019-10-02 PROCEDURE — 71046 X-RAY EXAM CHEST 2 VIEWS: CPT

## 2019-10-08 ENCOUNTER — TELEPHONE (OUTPATIENT)
Dept: FAMILY MEDICINE CLINIC | Age: 74
End: 2019-10-08

## 2019-10-08 NOTE — TELEPHONE ENCOUNTER
Pt is calling wanting her results she states she hasn't received a letter and she would like to know today

## 2019-10-08 NOTE — TELEPHONE ENCOUNTER
Contacted pt and verified name and . Informed pt of results, pt verbalized understanding, no questions at this time. Letter was mailed to pt.

## 2019-12-26 ENCOUNTER — OFFICE VISIT (OUTPATIENT)
Dept: FAMILY MEDICINE CLINIC | Age: 74
End: 2019-12-26

## 2019-12-26 VITALS
OXYGEN SATURATION: 95 % | HEART RATE: 66 BPM | TEMPERATURE: 98.3 F | BODY MASS INDEX: 23.16 KG/M2 | RESPIRATION RATE: 19 BRPM | WEIGHT: 139 LBS | DIASTOLIC BLOOD PRESSURE: 70 MMHG | SYSTOLIC BLOOD PRESSURE: 125 MMHG | HEIGHT: 65 IN

## 2019-12-26 DIAGNOSIS — R05.9 COUGH: ICD-10-CM

## 2019-12-26 DIAGNOSIS — J20.9 ACUTE BRONCHITIS, UNSPECIFIED ORGANISM: Primary | ICD-10-CM

## 2019-12-26 RX ORDER — BENZONATATE 200 MG/1
200 CAPSULE ORAL
Qty: 30 CAP | Refills: 1 | Status: SHIPPED | OUTPATIENT
Start: 2019-12-26 | End: 2020-01-17 | Stop reason: SDUPTHER

## 2019-12-26 RX ORDER — DOXYCYCLINE 100 MG/1
100 CAPSULE ORAL 2 TIMES DAILY
Qty: 20 CAP | Refills: 0 | Status: SHIPPED | OUTPATIENT
Start: 2019-12-26 | End: 2020-01-05

## 2019-12-26 NOTE — PROGRESS NOTES
Chief Complaint   Patient presents with    Cough    Generalized Body Aches    Fatigue       Subjective:   Carley Gresham is a 69 y.o. female who complains of congestion, nasal blockage, productive cough, generalized sinus pain and chills for 7 days, gradually worsening since that time. She denies a history of shortness of breath and wheezing. Evaluation to date: none. Treatment to date: OTC products. Patient does not smoke cigarettes. Relevant PMH: No pertinent additional PMH. Patient Active Problem List   Diagnosis Code    Hyperlipidemia E78.5    CAD (coronary artery disease) I25.10    Hypertension I10    GERD (gastroesophageal reflux disease) K21.9    S/P CABG x 1 Z95.1     Current Outpatient Medications   Medication Sig Dispense Refill    doxycycline (MONODOX) 100 mg capsule Take 1 Cap by mouth two (2) times a day for 10 days. 20 Cap 0    benzonatate (TESSALON) 200 mg capsule Take 1 Cap by mouth two (2) times daily as needed for Cough. 30 Cap 1    famotidine (PEPCID) 20 mg tablet Take 1 Tab by mouth two (2) times a day. 60 Tab 0    fluticasone propionate (FLONASE) 50 mcg/actuation nasal spray 1 [place] by Both Nostrils route two (2) times a day. 1 Bottle 1    PARoxetine (PAXIL) 20 mg tablet Take 1 Tab by mouth daily. 90 Tab 1    vit A/vit C/vit E/zinc/copper (PRESERVISION AREDS PO) Take  by mouth.  lisinopril (PRINIVIL, ZESTRIL) 10 mg tablet TAKE 1 TABLET BY MOUTH DAILY 90 Tab 3    metoprolol succinate (TOPROL-XL) 50 mg XL tablet TAKE 1 TABLET BY MOUTH EVERY DAY 90 Tab 3    pravastatin (PRAVACHOL) 80 mg tablet Take 1 Tab by mouth nightly. 90 Tab 3    calcium carbonate (CALTREX) 600 mg calcium (1,500 mg) tablet Take 600 mg by mouth two (2) times a day.  fish oil-omega-3 fatty acids (FISH OIL) 340-1,000 mg capsule Take 1 Cap by mouth two (2) times a day.  aspirin (SINDY CHILDRENS ASPIRIN) 81 mg chewable tablet Take 81 mg by mouth daily.       cholecalciferol, vitamin d3, (VITAMIN D) 1,000 unit tablet Take 1,000 Units by mouth daily. Allergies   Allergen Reactions    Amoxicillin Rash    Amoxicillin Rash    Estrogens, Conjugated Other (comments)    Sulfa (Sulfonamide Antibiotics) Nausea Only        Review of Systems  Pertinent items are noted in HPI. Objective:     Visit Vitals  /70 (BP 1 Location: Left arm, BP Patient Position: Sitting)   Pulse 66   Temp 98.3 °F (36.8 °C) (Oral)   Resp 19   Ht 5' 5\" (1.651 m)   Wt 139 lb (63 kg)   LMP  (LMP Unknown)   SpO2 95%   BMI 23.13 kg/m²     General:  alert, cooperative, no distress   Eyes: conjunctivae/corneas clear. PERRL, EOM's intact. Fundi benign   Ears: normal TM's and external ear canals AU   Sinuses: tenderness over generalized maxillary   Mouth:  Lips, mucosa, and tongue normal. Teeth and gums normal   Neck: supple, symmetrical, trachea midline and no adenopathy. Heart: S1 and S2 normal, no murmurs noted. Lungs: clear to auscultation bilaterally        Assessment/Plan:   sinusitis  Suggested symptomatic OTC remedies. Antibiotics per orders. RTC prn. ICD-10-CM ICD-9-CM    1. Acute bronchitis, unspecified organism J20.9 466.0 doxycycline (MONODOX) 100 mg capsule   2. Cough R05 786.2 benzonatate (TESSALON) 200 mg capsule     Encounter Diagnoses   Name Primary?  Acute bronchitis, unspecified organism Yes    Cough      Orders Placed This Encounter    doxycycline (MONODOX) 100 mg capsule    benzonatate (TESSALON) 200 mg capsule   .

## 2019-12-26 NOTE — PROGRESS NOTES
Chief Complaint   Patient presents with    Cough    Generalized Body Aches    Fatigue     Health Maintenance reviewed     1. Have you been to the ER, urgent care clinic since your last visit? Hospitalized since your last visit? No    2. Have you seen or consulted any other health care providers outside of the [address] [place] [address] since your last visit? Include any pap smears or colon screening.  No

## 2019-12-30 DIAGNOSIS — F32.A ANXIETY AND DEPRESSION: ICD-10-CM

## 2019-12-30 DIAGNOSIS — F41.9 ANXIETY AND DEPRESSION: ICD-10-CM

## 2019-12-31 RX ORDER — PAROXETINE HYDROCHLORIDE 20 MG/1
20 TABLET, FILM COATED ORAL DAILY
Qty: 90 TAB | Refills: 1 | Status: SHIPPED | OUTPATIENT
Start: 2019-12-31 | End: 2020-06-18

## 2020-01-09 RX ORDER — PRAVASTATIN SODIUM 80 MG/1
80 TABLET ORAL
Qty: 90 TAB | Refills: 3 | Status: SHIPPED | OUTPATIENT
Start: 2020-01-09 | End: 2021-01-06 | Stop reason: SDUPTHER

## 2020-01-09 NOTE — TELEPHONE ENCOUNTER
Requested Prescriptions     Pending Prescriptions Disp Refills    pravastatin (PRAVACHOL) 80 mg tablet 90 Tab 3     Sig: Take 1 Tab by mouth nightly.      Requested by fax

## 2020-01-14 ENCOUNTER — TELEPHONE (OUTPATIENT)
Dept: FAMILY MEDICINE CLINIC | Age: 75
End: 2020-01-14

## 2020-01-14 DIAGNOSIS — R05.9 COUGH: ICD-10-CM

## 2020-01-14 NOTE — TELEPHONE ENCOUNTER
Called pt, verified name and . Pt stated that she still has the lingering cough that she's had since her last visit. She would like to know if you can call her in [place] since they helped her the last time. Please advise.

## 2020-01-14 NOTE — TELEPHONE ENCOUNTER
Patient was last seen in December for a cold. She still hasn't gotten better. She is requesting to be seen today or tomorrow.

## 2020-01-17 RX ORDER — BENZONATATE 200 MG/1
200 CAPSULE ORAL
Qty: 30 CAP | Refills: 1 | Status: SHIPPED | OUTPATIENT
Start: 2020-01-17 | End: 2020-04-21 | Stop reason: ALTCHOICE

## 2020-01-17 RX ORDER — AZITHROMYCIN 250 MG/1
TABLET, FILM COATED ORAL
Qty: 6 TAB | Refills: 0 | Status: SHIPPED | OUTPATIENT
Start: 2020-01-17 | End: 2020-01-22

## 2020-01-17 NOTE — TELEPHONE ENCOUNTER
Please advise pt that tessalon and an antibiotic were called to her pharmacy on file, if not feeling better after these medications please follow up in office.

## 2020-01-17 NOTE — TELEPHONE ENCOUNTER
Called pt, verified name and . Informed pt that per Dr. Anand Montesinos Please advise pt that tessalon and an antibiotic were called to her pharmacy on file, if not feeling better after these medications please follow up in office. PT stated understanding.

## 2020-02-13 RX ORDER — LISINOPRIL 10 MG/1
TABLET ORAL
Qty: 90 TAB | Refills: 2 | Status: SHIPPED | OUTPATIENT
Start: 2020-02-13 | End: 2020-04-27 | Stop reason: SDUPTHER

## 2020-02-24 NOTE — TELEPHONE ENCOUNTER
[facility] pharmacy is requesting a refill on    Requested Prescriptions     Pending Prescriptions Disp Refills    metoprolol succinate (TOPROL-XL) 50 mg XL tablet 90 Tab 3     Sig: TAKE 1 TABLET BY MOUTH EVERY DAY

## 2020-02-25 RX ORDER — METOPROLOL SUCCINATE 50 MG/1
TABLET, EXTENDED RELEASE ORAL
Qty: 90 TAB | Refills: 3 | Status: SHIPPED | OUTPATIENT
Start: 2020-02-25 | End: 2021-01-06 | Stop reason: SDUPTHER

## 2020-04-01 ENCOUNTER — TELEPHONE (OUTPATIENT)
Dept: FAMILY MEDICINE CLINIC | Age: 75
End: 2020-04-01

## 2020-04-02 ENCOUNTER — VIRTUAL VISIT (OUTPATIENT)
Dept: FAMILY MEDICINE CLINIC | Age: 75
End: 2020-04-02

## 2020-04-02 VITALS
BODY MASS INDEX: 23.16 KG/M2 | SYSTOLIC BLOOD PRESSURE: 139 MMHG | WEIGHT: 139 LBS | HEIGHT: 65 IN | DIASTOLIC BLOOD PRESSURE: 75 MMHG | HEART RATE: 63 BPM

## 2020-04-02 DIAGNOSIS — B37.9 YEAST INFECTION: Primary | ICD-10-CM

## 2020-04-02 RX ORDER — FLUCONAZOLE 150 MG/1
150 TABLET ORAL DAILY
Qty: 2 TAB | Refills: 0 | Status: SHIPPED | OUTPATIENT
Start: 2020-04-02 | End: 2020-04-21 | Stop reason: ALTCHOICE

## 2020-04-02 NOTE — PROGRESS NOTES
Chief Complaint   Patient presents with    Yeast Infection     Health Maintenance reviewed     1. Have you been to the ER, urgent care clinic since your last visit? Hospitalized since your last visit? No    2. Have you seen or consulted any other health care providers outside of the [address] [place] [address] since your last visit? Include any pap smears or colon screening.  No

## 2020-04-02 NOTE — PROGRESS NOTES
Chief Complaint   Patient presents with    Yeast Infection     Unable to connect via Doxy. me, transfer to phone call. Pt reports that she has noticed white stuff in her urine, denies any urinary symptoms other than chronic mild  leakage. Pt does reports some mild irritation. Pt has been eating more probiotics, increased to twice daily, concerned that this could be causing symptoms. Agreed to treat as yeast, will send to pharmacy on file. Daisy Pond is a 69 y.o. female evaluated via telephone on 4/2/2020. Consent:  She and/or health care decision maker is aware that that she may receive a bill for this telephone service, depending on her insurance coverage, and has provided verbal consent to proceed: Yes      Documentation:  I communicated with the patient and/or health care decision maker about change in urination. Details of this discussion including any medical advice provided: Advised patient to decrease probiotics to once daily and take medication as prescribed. Advised patient that if symptoms did not improve to call back to the office and we would work to collect a urine sample      I affirm this is a Patient Initiated Episode with an Established Patient who has not had a related appointment within my department in the past 7 days or scheduled within the next 24 hours.     Total Time: minutes: 5-10 minutes    Note: not billable if this call serves to triage the patient into an appointment for the relevant concern      Christiana Husain MD

## 2020-04-21 ENCOUNTER — VIRTUAL VISIT (OUTPATIENT)
Dept: FAMILY MEDICINE CLINIC | Age: 75
End: 2020-04-21

## 2020-04-21 VITALS — HEIGHT: 65 IN | BODY MASS INDEX: 23.16 KG/M2 | WEIGHT: 139 LBS

## 2020-04-21 DIAGNOSIS — R10.2 VAGINAL PAIN: ICD-10-CM

## 2020-04-21 DIAGNOSIS — R30.0 DYSURIA: Primary | ICD-10-CM

## 2020-04-21 RX ORDER — CIPROFLOXACIN 500 MG/1
500 TABLET ORAL 2 TIMES DAILY
Qty: 20 TAB | Refills: 0 | Status: SHIPPED | OUTPATIENT
Start: 2020-04-21 | End: 2020-05-01

## 2020-04-21 NOTE — PROGRESS NOTES
Chief Complaint   Patient presents with    Yeast Infection     Pt states that she is still having stuff in her urine and the medication she was given last time did not help      Pt was unable to use doxy. me, visit was conducted via phone only. Pt reports that despite taking diflucan she has noted any improvement in the white things in her urine. Pt has now also developed back pain that comes and goes, along with some vaginal pain with sitting that is intermittent as well. Pt is very concerned about possible cancer as a cause of her pain. Pt is reluctant to go for any testing due to COVID exposure risk. No fever, no difficulty passing urine. Papo Maier is a 69 y.o. female evaluated via telephone on 4/21/2020. Consent:  She and/or health care decision maker is aware that that she may receive a bill for this telephone service, depending on her insurance coverage, and has provided verbal consent to proceed: Yes      Documentation:  I communicated with the patient and/or health care decision maker about   -as listed above. Details of this discussion including any medical advice provided:     1. Dysuria  -take medication as written, advised pt that if she has not noted improvement in 2-3 days then we would have her go to [facility] to leave a urine sample    2. Vaginal pain  -unclear if this is related, odd that symptoms are so intermittent, will see if they improve, if not then exam and possible Gyn eval needed      I affirm this is a Patient Initiated Episode with an Established Patient who has not had a related appointment within my department in the past 7 days or scheduled within the next 24 hours.     Total Time: minutes: 11-20 minutes    Note: not billable if this call serves to triage the patient into an appointment for the relevant concern      Jayla Holguin MD

## 2020-04-21 NOTE — PROGRESS NOTES
Chief Complaint   Patient presents with    Yeast Infection     Pt states that she is still having stuff in her urine and the medication she was given last time did not help        1. Have you been to the ER, urgent care clinic since your last visit? Hospitalized since your last visit? No    2. Have you seen or consulted any other health care providers outside of the [address] [place] [address] since your last visit? Include any pap smears or colon screening. No    There are no preventive care reminders to display for this patient.

## 2020-04-27 RX ORDER — LISINOPRIL 10 MG/1
TABLET ORAL
Qty: 90 TAB | Refills: 2 | Status: SHIPPED | OUTPATIENT
Start: 2020-04-27 | End: 2020-08-31

## 2020-04-27 NOTE — TELEPHONE ENCOUNTER
Andre is calling requesting a refill on med for pt      Requested Prescriptions     Pending Prescriptions Disp Refills    lisinopriL (PRINIVIL, ZESTRIL) 10 mg tablet 90 Tab 2

## 2020-05-26 ENCOUNTER — TELEPHONE (OUTPATIENT)
Dept: FAMILY MEDICINE CLINIC | Age: 75
End: 2020-05-26

## 2020-05-26 DIAGNOSIS — I10 ESSENTIAL HYPERTENSION: ICD-10-CM

## 2020-05-26 DIAGNOSIS — E78.2 MIXED HYPERLIPIDEMIA: Primary | ICD-10-CM

## 2020-05-26 DIAGNOSIS — E55.9 VITAMIN D DEFICIENCY: ICD-10-CM

## 2020-05-27 NOTE — TELEPHONE ENCOUNTER
Called pt and verified name and . Pt voiced understanding. Pt would like labs to go to [facility] in [address] [facility], please order labs and we can fax today, thanks!

## 2020-05-27 NOTE — TELEPHONE ENCOUNTER
Please advise pt that we do have phlebotomy available at this time. She would need to go to [facility] for lab work. Please advise if she would like to do this and which location.

## 2020-05-28 ENCOUNTER — HOSPITAL ENCOUNTER (OUTPATIENT)
Dept: LAB | Age: 75
Discharge: HOME | End: 2020-05-28
Payer: MEDICARE

## 2020-05-28 PROCEDURE — 84443 ASSAY THYROID STIM HORMONE: CPT

## 2020-05-28 PROCEDURE — 85027 COMPLETE CBC AUTOMATED: CPT

## 2020-05-28 PROCEDURE — 82306 VITAMIN D 25 HYDROXY: CPT

## 2020-05-28 PROCEDURE — 80061 LIPID PANEL: CPT

## 2020-05-28 PROCEDURE — 80053 COMPREHEN METABOLIC PANEL: CPT

## 2020-05-28 PROCEDURE — 36415 COLL VENOUS BLD VENIPUNCTURE: CPT

## 2020-05-29 LAB
25(OH)D3+25(OH)D2 SERPL-MCNC: 39 NG/ML (ref 30–100)
ALBUMIN SERPL-MCNC: 4.7 G/DL (ref 3.7–4.7)
ALBUMIN/GLOB SERPL: 2.5 {RATIO} (ref 1.2–2.2)
ALP SERPL-CCNC: 89 IU/L (ref 39–117)
ALT SERPL-CCNC: 16 IU/L (ref 0–32)
AST SERPL-CCNC: 23 IU/L (ref 0–40)
BILIRUB SERPL-MCNC: 0.3 MG/DL (ref 0–1.2)
BUN SERPL-MCNC: 19 MG/DL (ref 8–27)
BUN/CREAT SERPL: 22 (ref 12–28)
CALCIUM SERPL-MCNC: 9.5 MG/DL (ref 8.7–10.3)
CHLORIDE SERPL-SCNC: 102 MMOL/L (ref 96–106)
CHOLEST SERPL-MCNC: 205 MG/DL (ref 100–199)
CO2 SERPL-SCNC: 24 MMOL/L (ref 20–29)
CREAT SERPL-MCNC: 0.85 MG/DL (ref 0.57–1)
ERYTHROCYTE [DISTWIDTH] IN BLOOD BY AUTOMATED COUNT: 12 % (ref 11.7–15.4)
GLOBULIN SER CALC-MCNC: 1.9 G/DL (ref 1.5–4.5)
GLUCOSE SERPL-MCNC: 96 MG/DL (ref 65–99)
HCT VFR BLD AUTO: 36.9 % (ref 34–46.6)
HDLC SERPL-MCNC: 98 MG/DL
HGB BLD-MCNC: 12.4 G/DL (ref 11.1–15.9)
INTERPRETATION, 910389: NORMAL
LDLC SERPL CALC-MCNC: 91 MG/DL (ref 0–99)
MCH RBC QN AUTO: 30.6 PG (ref 26.6–33)
MCHC RBC AUTO-ENTMCNC: 33.6 G/DL (ref 31.5–35.7)
MCV RBC AUTO: 91 FL (ref 79–97)
PLATELET # BLD AUTO: 237 X10E3/UL (ref 150–450)
POTASSIUM SERPL-SCNC: 4.5 MMOL/L (ref 3.5–5.2)
PROT SERPL-MCNC: 6.6 G/DL (ref 6–8.5)
RBC # BLD AUTO: 4.05 X10E6/UL (ref 3.77–5.28)
SODIUM SERPL-SCNC: 143 MMOL/L (ref 134–144)
TRIGL SERPL-MCNC: 82 MG/DL (ref 0–149)
TSH SERPL DL<=0.005 MIU/L-ACNC: 1.64 UIU/ML (ref 0.45–4.5)
VLDLC SERPL CALC-MCNC: 16 MG/DL (ref 5–40)
WBC # BLD AUTO: 7.5 X10E3/UL (ref 3.4–10.8)

## 2020-06-18 DIAGNOSIS — F32.A ANXIETY AND DEPRESSION: ICD-10-CM

## 2020-06-18 DIAGNOSIS — F41.9 ANXIETY AND DEPRESSION: ICD-10-CM

## 2020-06-18 RX ORDER — PAROXETINE HYDROCHLORIDE 20 MG/1
TABLET, FILM COATED ORAL
Qty: 90 TAB | Refills: 1 | Status: SHIPPED | OUTPATIENT
Start: 2020-06-18 | End: 2020-11-05 | Stop reason: ALTCHOICE

## 2020-07-23 NOTE — PROGRESS NOTES
HPI  Idalia Cintron is a 73 y.o. female who presents with right ankle pain. She twisted it going down the stairs 4 days ago. It was a internal rotation and plantar flexion motion that she twisted it in. There was immediate pain. She fell down with no other injuries. She had to be jacobo with it when bearing weight immediately afterward. Wrapped it up in an Ace bandage and was able to walk on it. Is doing okay today but experiencing significant swelling and wants to know if she needs an x-ray. In addition Ace bandage is using ibuprofen with moderate relief    PMHx:  Past Medical History:   Diagnosis Date    Back pain     CAD (coronary artery disease)     CABG, [place]    Hypercholesterolemia     Hypertension        Meds:   Current Outpatient Prescriptions   Medication Sig Dispense Refill    B.infantis-B.ani-B.long-B.bifi (PROBIOTIC 4X) 10-15 mg TbEC Take  by mouth.  lisinopril (PRINIVIL, ZESTRIL) 10 mg tablet Take 1 Tab by mouth daily. 90 Tab 3    metoprolol succinate (TOPROL-XL) 50 mg XL tablet TAKE 1 TABLET BY MOUTH EVERY DAY 90 Tab 3    pravastatin (PRAVACHOL) 80 mg tablet Take 1 Tab by mouth nightly. 90 Tab 3    vitamin c-vitamin e (CRANBERRY CONCENTRATE) cap Take 1 Tab by mouth daily.  aspirin (SINDY CHILDRENS ASPIRIN) 81 mg chewable tablet Take 81 mg by mouth daily.  cholecalciferol, vitamin d3, (VITAMIN D) 1,000 unit tablet Take 1,000 Units by mouth daily. Allergies: Allergies   Allergen Reactions    Amoxicillin Rash    Amoxicillin Rash    Sulfa (Sulfonamide Antibiotics) Nausea Only       Smoker:  History   Smoking Status    Never Smoker   Smokeless Tobacco    Never Used       ETOH:   History   Alcohol Use    Yes     Comment: occ       FH:   Family History   Problem Relation Age of Onset    Heart Attack Mother     Heart Disease Mother     Heart Disease Father     Heart Disease Sister     Emphysema Sister        ROS:   As listed in HPI.  In addition:  Constitutional:   No headache, fever, fatigue, weight loss or weight gain      Cardiac:    No chest pain      Resp:   No cough or shortness of breath      Neuro   No loss of consciousness, dizziness, seizures      Physical Exam:  Blood pressure 124/69, pulse (!) 58, temperature 97.6 °F (36.4 °C), resp. rate 12, height 5' 5\" (1.651 m), weight 137 lb (62.1 kg), SpO2 96 %. GEN: No apparent distress. Alert and oriented and responds to all questions appropriately. NEUROLOGIC:  No focal neurologic deficits. Strength and sensation grossly intact. Coordination and gait grossly intact. EXT: Well perfused. No edema. SKIN: No obvious rashes. Right ankle examined, there is significant swelling particularly on the lateral aspect. Bruising on the dorsum . There is pain over the ATFL. There is no pain on palpation of the ligaments. The joint is stable. Her gait is intact. Had one little wobble where she almost internally rotated but caught herself easily       Assessment and Plan     Sprain, relatively minor. ATFL is only one appears to be involved. Brace would be optional for her. She is concerned about her ability to drive. Ace bandage provided good relief and a feeling of stability so go back to using this. Educated her on the type of brace that she would need to get. Continue NSAIDs. Continue ice. Provided her with exercises for rehab to be started in 1 week. Gave her an exercise band to help complete these. Flu shot today    Mammogram done at [facility] 6/17     NGUYEN Toney 6/16    Glaucoma screening Dr. Valerie Jackson in [facility] 11/16    Colonoscopy Dr. Vipul Banks 3/17      ICD-10-CM ICD-9-CM    1. Sprain of tibiofibular ligament of right ankle, initial encounter S93.431A 845.03    2. Acute right ankle pain M25.571 719.47      338.19    3.  Encounter for immunization Z23 V03.89 INFLUENZA VIRUS VACCINE, HIGH DOSE SEASONAL, PRESERVATIVE FREE      ADMIN INFLUENZA VIRUS VAC       AVS given. Pt expressed understanding of instructions ROS:   Constitutional- -fever, -chills.    ENT- -rhinorrhea, no sore throat, no congestion.    Cardiac- no chest pain, no palpitations,   Respiratory- -cough, -SOB    Abdomen- No nausea, no vomiting, no diarrhea.    Urinary- no dysuria, no urgency, no frequency.    Skin- No rashes

## 2020-08-31 ENCOUNTER — OFFICE VISIT (OUTPATIENT)
Dept: FAMILY MEDICINE CLINIC | Age: 75
End: 2020-08-31
Payer: MEDICARE

## 2020-08-31 VITALS
BODY MASS INDEX: 24.16 KG/M2 | HEIGHT: 65 IN | HEART RATE: 66 BPM | OXYGEN SATURATION: 98 % | WEIGHT: 145 LBS | DIASTOLIC BLOOD PRESSURE: 81 MMHG | RESPIRATION RATE: 18 BRPM | TEMPERATURE: 98.3 F | SYSTOLIC BLOOD PRESSURE: 157 MMHG

## 2020-08-31 DIAGNOSIS — R41.3 MEMORY CHANGES: ICD-10-CM

## 2020-08-31 DIAGNOSIS — Z00.00 MEDICARE ANNUAL WELLNESS VISIT, SUBSEQUENT: ICD-10-CM

## 2020-08-31 DIAGNOSIS — I10 ESSENTIAL HYPERTENSION: ICD-10-CM

## 2020-08-31 DIAGNOSIS — F32.A ANXIETY AND DEPRESSION: ICD-10-CM

## 2020-08-31 DIAGNOSIS — F41.9 ANXIETY AND DEPRESSION: ICD-10-CM

## 2020-08-31 DIAGNOSIS — R05.9 COUGH: Primary | ICD-10-CM

## 2020-08-31 PROCEDURE — G8420 CALC BMI NORM PARAMETERS: HCPCS | Performed by: FAMILY MEDICINE

## 2020-08-31 PROCEDURE — G8754 DIAS BP LESS 90: HCPCS | Performed by: FAMILY MEDICINE

## 2020-08-31 PROCEDURE — 3288F FALL RISK ASSESSMENT DOCD: CPT | Performed by: FAMILY MEDICINE

## 2020-08-31 PROCEDURE — G0463 HOSPITAL OUTPT CLINIC VISIT: HCPCS | Performed by: FAMILY MEDICINE

## 2020-08-31 PROCEDURE — G8510 SCR DEP NEG, NO PLAN REQD: HCPCS | Performed by: FAMILY MEDICINE

## 2020-08-31 PROCEDURE — G8399 PT W/DXA RESULTS DOCUMENT: HCPCS | Performed by: FAMILY MEDICINE

## 2020-08-31 PROCEDURE — G8753 SYS BP > OR = 140: HCPCS | Performed by: FAMILY MEDICINE

## 2020-08-31 PROCEDURE — G9899 SCRN MAM PERF RSLTS DOC: HCPCS | Performed by: FAMILY MEDICINE

## 2020-08-31 PROCEDURE — G8536 NO DOC ELDER MAL SCRN: HCPCS | Performed by: FAMILY MEDICINE

## 2020-08-31 PROCEDURE — G0439 PPPS, SUBSEQ VISIT: HCPCS | Performed by: FAMILY MEDICINE

## 2020-08-31 PROCEDURE — 99213 OFFICE O/P EST LOW 20 MIN: CPT | Performed by: FAMILY MEDICINE

## 2020-08-31 PROCEDURE — G8427 DOCREV CUR MEDS BY ELIG CLIN: HCPCS | Performed by: FAMILY MEDICINE

## 2020-08-31 PROCEDURE — 1100F PTFALLS ASSESS-DOCD GE2>/YR: CPT | Performed by: FAMILY MEDICINE

## 2020-08-31 PROCEDURE — 1090F PRES/ABSN URINE INCON ASSESS: CPT | Performed by: FAMILY MEDICINE

## 2020-08-31 PROCEDURE — 3017F COLORECTAL CA SCREEN DOC REV: CPT | Performed by: FAMILY MEDICINE

## 2020-08-31 RX ORDER — LOSARTAN POTASSIUM 50 MG/1
50 TABLET ORAL DAILY
Qty: 90 TAB | Refills: 3 | Status: SHIPPED | OUTPATIENT
Start: 2020-08-31 | End: 2021-01-06 | Stop reason: SDUPTHER

## 2020-08-31 NOTE — PROGRESS NOTES
Chief Complaint   Patient presents with    Generalized Body Aches    Cough     On and off since Dec (pt denied cough at screening)      Pt reports that she has had a cough since December, off and on, none today but did have some cough last night. Pt reports that she hurts all over. Never goes away, but worse in the mornings. Pt is concerned that she being be having memory issues, pt reports that she has been noticing it for a while, however recently family has noticed a change in her. Pt would like to come off of Paxil, no longer feels like it is helping, does not want to be on other medication. Subjective: (As above and below)     Chief Complaint   Patient presents with    Generalized Body Aches    Cough     On and off since Dec (pt denied cough at screening)      she is a 69y.o. year old female who presents for evaluation. Reviewed PmHx, RxHx, FmHx, SocHx, AllgHx and updated in chart. Review of Systems - negative except as listed above    Objective:     Vitals:    08/31/20 0954   BP: 157/81   Pulse: 66   Resp: 18   Temp: 98.3 °F (36.8 °C)   TempSrc: Temporal   SpO2: 98%   Weight: 145 lb (65.8 kg)   Height: 5' 5\" (1.651 m)     Physical Examination: General appearance - alert, well appearing, and in no distress  Mental status - normal mood, behavior, speech, dress, motor activity, and thought processes  Ears - bilateral TM's and external ear canals normal  Chest - clear to auscultation, no wheezes, rales or rhonchi, symmetric air entry  Heart - normal rate, regular rhythm, normal S1, S2, no murmurs, rubs, clicks or gallops  Musculoskeletal - no joint tenderness, deformity or swelling  Extremities - peripheral pulses normal, no pedal edema, no clubbing or cyanosis    Assessment/ Plan:   1. Cough  -change BP medication, check x-ray  - XR CHEST PA LAT; Future    2. Essential hypertension  -change medication due to possible side effect  - losartan (COZAAR) 50 mg tablet;  Take 1 Tab by mouth daily.  Dispense: 90 Tab; Refill: 3    3. Memory changes  -see below  - REFERRAL TO NEUROLOGY     4. Medicare annual wellness visit, subsequent  -see below    5. Anxiety and depression  -advised pt to drop to paxil 10mg x 1 month, then follow up      I have discussed the diagnosis with the patient and the intended plan as seen in the above orders. The patient has received an after-visit summary and questions were answered concerning future plans. Medication Side Effects and Warnings were discussed with patient: yes  Patient Labs were reviewed: yes  Patient Past Records were reviewed:  yes    Barbara Irving M.D. This is the Subsequent Medicare Annual Wellness Exam, performed 12 months or more after the Initial AWV or the last Subsequent AWV    I have reviewed the patient's medical history in detail and updated the computerized patient record. History     Patient Active Problem List   Diagnosis Code    Hyperlipidemia E78.5    CAD (coronary artery disease) I25.10    Hypertension I10    GERD (gastroesophageal reflux disease) K21.9    S/P CABG x 1 Z95.1     Past Medical History:   Diagnosis Date    Back pain     CAD (coronary artery disease)     CABG, [place]    Dry eye     Hypercholesterolemia     Hypertension       Past Surgical History:   Procedure Laterality Date    HX CORONARY ARTERY BYPASS GRAFT  2002    HX GYN      HX TOTAL VAGINAL HYSTERECTOMY  2014     Current Outpatient Medications   Medication Sig Dispense Refill    losartan (COZAAR) 50 mg tablet Take 1 Tab by mouth daily. 90 Tab 3    PARoxetine (PAXIL) 20 mg tablet TAKE 1 TABLET BY MOUTH EVERY DAY 90 Tab 1    metoprolol succinate (TOPROL-XL) 50 mg XL tablet TAKE 1 TABLET BY MOUTH EVERY DAY 90 Tab 3    pravastatin (PRAVACHOL) 80 mg tablet Take 1 Tab by mouth nightly. 90 Tab 3    vit A/vit C/vit E/zinc/copper (PRESERVISION AREDS PO) Take  by mouth.       fish oil-omega-3 fatty acids (FISH OIL) 340-1,000 mg capsule Take 1 Cap by mouth two (2) times a day.  aspirin (SINDY CHILDRENS ASPIRIN) 81 mg chewable tablet Take 81 mg by mouth daily.  cholecalciferol, vitamin d3, (VITAMIN D) 1,000 unit tablet Take 1,000 Units by mouth daily. Allergies   Allergen Reactions    Amoxicillin Rash    Amoxicillin Rash    Estrogens, Conjugated Other (comments)    Sulfa (Sulfonamide Antibiotics) Nausea Only       Family History   Problem Relation Age of Onset    Heart Attack Mother     Heart Disease Mother     Heart Disease Father     Heart Disease Sister     Emphysema Sister      Social History     Tobacco Use    Smoking status: Never Smoker    Smokeless tobacco: Never Used   Substance Use Topics    Alcohol use: Yes     Comment: occ       Depression Risk Factor Screening:     3 most recent PHQ Screens 8/31/2020   PHQ Not Done -   Little interest or pleasure in doing things Not at all   Feeling down, depressed, irritable, or hopeless Several days   Total Score PHQ 2 1       Alcohol Risk Factor Screening:   Do you average 1 drink per night or more than 7 drinks a week:  No    On any one occasion in the past three months have you have had more than 3 drinks containing alcohol:  No      Functional Ability and Level of Safety:   Hearing: Hearing is good. Activities of Daily Living: The home contains: no safety equipment. Patient does total self care     Ambulation: with no difficulty     Fall Risk:  Fall Risk Assessment, last 12 mths 8/31/2020   Able to walk? Yes   Fall in past 12 months? Yes   Fall with injury?  No   Number of falls in past 12 months 2   Fall Risk Score 2     Abuse Screen:  Patient is not abused       Cognitive Screening   Has your family/caregiver stated any concerns about your memory: yes - referred for testing     Cognitive Screening: please see scanned report    Patient Care Team   Patient Care Team:  Kem Coffman MD as PCP - General (Family Medicine)  [facility], Noreen Spurling, MD as PCP - REHABILITATION [facility] Empaneled Provider  Abby Castaneda MD (Cardiology)    Assessment/Plan   Education and counseling provided:  Are appropriate based on today's review and evaluation    Diagnoses and all orders for this visit:    1. Cough  -     XR CHEST PA LAT; Future    2. Essential hypertension  -     losartan (COZAAR) 50 mg tablet; Take 1 Tab by mouth daily. 3. Memory changes  -     REFERRAL TO NEUROLOGY    4. Medicare annual wellness visit, subsequent    5.  Anxiety and depression        Health Maintenance Due   Topic Date Due    GLAUCOMA SCREENING Q2Y  07/09/2020

## 2020-08-31 NOTE — PATIENT INSTRUCTIONS
Medicare Wellness Visit, Female The best way to live healthy is to have a lifestyle where you eat a well-balanced diet, exercise regularly, limit alcohol use, and quit all forms of tobacco/nicotine, if applicable. Regular preventive services are another way to keep healthy. Preventive services (vaccines, screening tests, monitoring & exams) can help personalize your care plan, which helps you manage your own care. Screening tests can find health problems at the earliest stages, when they are easiest to treat. [facility] follows the current, evidence-based guidelines published by the [facility] Demian Kumar ([facility]) when recommending preventive services for our patients. Because we follow these guidelines, sometimes recommendations change over time as research supports it. (For example, mammograms used to be recommended annually. Even though Medicare will still pay for an annual mammogram, the newer guidelines recommend a mammogram every two years for women of average risk). Of course, you and your doctor may decide to screen more often for some diseases, based on your risk and your co-morbidities (chronic disease you are already diagnosed with). Preventive services for you include: - Medicare offers their members a free annual wellness visit, which is time for you and your primary care provider to discuss and plan for your preventive service needs. Take advantage of this benefit every year! 
-All adults over the age of 72 should receive the recommended pneumonia vaccines. Current USPSTF guidelines recommend a series of two vaccines for the best pneumonia protection.  
-All adults should have a flu vaccine yearly and a tetanus vaccine every 10 years.  
-All adults age 48 and older should receive the shingles vaccines (series of two vaccines). -All adults age 38-68 who are overweight should have a diabetes screening test once every three years. -All adults born between [birth year] and 1963 should be screened once for Hepatitis C. 
-Other screening tests and preventive services for persons with diabetes include: an eye exam to screen for diabetic retinopathy, a kidney function test, a foot exam, and stricter control over your cholesterol.  
-Cardiovascular screening for adults with routine risk involves an electrocardiogram (ECG) at intervals determined by your doctor.  
-Colorectal cancer screenings should be done for adults age 54-65 with no increased risk factors for colorectal cancer. There are a number of acceptable methods of screening for this type of cancer. Each test has its own benefits and drawbacks. Discuss with your doctor what is most appropriate for you during your annual wellness visit. The different tests include: colonoscopy (considered the best screening method), a fecal occult blood test, a fecal DNA test, and sigmoidoscopy. 
 
-A bone mass density test is recommended when a woman turns 65 to screen for osteoporosis. This test is only recommended one time, as a screening. Some providers will use this same test as a disease monitoring tool if you already have osteoporosis. -Breast cancer screenings are recommended every other year for women of normal risk, age 54-69. 
-Cervical cancer screenings for women over age 72 are only recommended with certain risk factors. Here is a list of your current Health Maintenance items (your personalized list of preventive services) with a due date: 
Health Maintenance Due Topic Date Due  Glaucoma Screening   07/09/2020

## 2020-08-31 NOTE — PROGRESS NOTES
Chief Complaint   Patient presents with    Generalized Body Aches    Cough     On and off since Dec (pt denied cough at screening)      1. Have you been to the ER, urgent care clinic since your last visit? Hospitalized since your last visit? No    2. Have you seen or consulted any other health care providers outside of the [address] [place] [address] since your last visit? Include any pap smears or colon screening.  No    Health Maintenance Due   Topic Date Due    Shingrix Vaccine Age 49> (1 of 2) 12/11/1997    GLAUCOMA SCREENING Q2Y  07/09/2020    Medicare Yearly Exam  07/09/2020

## 2020-09-02 ENCOUNTER — HOSPITAL ENCOUNTER (OUTPATIENT)
Dept: GENERAL RADIOLOGY | Age: 75
Discharge: HOME | End: 2020-09-02
Payer: MEDICARE

## 2020-09-02 DIAGNOSIS — R05.9 COUGH: ICD-10-CM

## 2020-09-02 PROCEDURE — 71046 X-RAY EXAM CHEST 2 VIEWS: CPT

## 2020-09-16 ENCOUNTER — OFFICE VISIT (OUTPATIENT)
Dept: NEUROLOGY | Age: 75
End: 2020-09-16
Payer: MEDICARE

## 2020-09-16 DIAGNOSIS — R41.3 MEMORY DEFICITS: ICD-10-CM

## 2020-09-16 DIAGNOSIS — F41.1 GENERALIZED ANXIETY DISORDER: ICD-10-CM

## 2020-09-16 DIAGNOSIS — F33.1 MODERATE EPISODE OF RECURRENT MAJOR DEPRESSIVE DISORDER (HCC): Primary | ICD-10-CM

## 2020-09-16 PROCEDURE — 96116 NUBHVL XM PHYS/QHP 1ST HR: CPT | Performed by: PSYCHOLOGIST

## 2020-09-16 NOTE — PROGRESS NOTES
This note will not be viewable in [address] [facility] at [place] [id number] [address]    Office:  [phone]  Fax: [phone]                 Initial Office Exam    Patient Name: Rhiannon Ryan  Age: 69 y.o. Gender: female   Occupation: Retired  Handedness: left handed   Presenting Concern: Memory problems vs Depression  Primary Care Physician: Hayes Mancia MD  Referring Provider: Alec Mancia 9:  This comprehensive and medically necessary neuropsychological assessment was requested to assist with a differential diagnosis of dementia. The use and purpose of this examination, as well as the extent and limitations of confidentiality, were explained prior to obtaining permission to participate. Instructions were provided regarding the necessity to put forth optimal effort and answer questions truthfully in order to obtain reliable and accurate test results. PERTINENT HISTORY:  Ms. James Snyder presented for a neuropsychological assessment at the recommendation of her treating physician secondary to complaints of memory loss. She has reported symptoms that include decreased attention, more withdrawn, sleep difficulties,, depression, anxiety, feeling slowed down, becoming aggressive towards others, and easily frustrated. Ms. James Snyder began noticing symptoms following the death of her  in 2014. From a brief review of her medical and personal history there has not been any other significant neurological injury or illness noted or reported. She did report experiencing depression or anxiety in the past.  She also has a sister with depression and anxiety. Ms. James Snyder does not  report any problems at birth or difficulties meeting developmental milestones.  She reports that she had an adequate level of family support and was not subject to trauma or abuse as a child. Ms. Zachary Palacio does not  report being retain in school or receiving special assistance in any of she classes or subjects. Ms. Zachary Palacio completed 11 years of education. She reports that she has been  three times, and the first two were not good marriages. Ms. Zachary Palacio does not  exercise on a regular basis and does not  maintain a balanced diet. She does report problems with sleep and does  complain of pain. She does not  participate in mentally stimulating activities. Ms. Zachary Palacio does not  have concerns or stressors at this time. Ms. Zachary Palacio indicated that she is independent in her instrumental activities of daily living, including shopping, meal preparation, housekeeping, doing laundry, driving a car, managing medications, and finances. Current Outpatient Medications   Medication Sig    losartan (COZAAR) 50 mg tablet Take 1 Tab by mouth daily.  PARoxetine (PAXIL) 20 mg tablet TAKE 1 TABLET BY MOUTH EVERY DAY    metoprolol succinate (TOPROL-XL) 50 mg XL tablet TAKE 1 TABLET BY MOUTH EVERY DAY    pravastatin (PRAVACHOL) 80 mg tablet Take 1 Tab by mouth nightly.  vit A/vit C/vit E/zinc/copper (PRESERVISION AREDS PO) Take  by mouth.  fish oil-omega-3 fatty acids (FISH OIL) 340-1,000 mg capsule Take 1 Cap by mouth two (2) times a day.  aspirin (SINDY CHILDRENS ASPIRIN) 81 mg chewable tablet Take 81 mg by mouth daily.  cholecalciferol, vitamin d3, (VITAMIN D) 1,000 unit tablet Take 1,000 Units by mouth daily. No current facility-administered medications for this visit. Past Medical History:   Diagnosis Date    Back pain     CAD (coronary artery disease)     CABG, [place]    Dry eye     Hypercholesterolemia     Hypertension        No flowsheet data found.     No data recorded    Past Surgical History:   Procedure Laterality Date    HX CORONARY ARTERY BYPASS GRAFT  2002    HX GYN      HX TOTAL VAGINAL HYSTERECTOMY  2014       Social History     Socioeconomic History    Marital status:      Spouse name: Not on file    Number of children: Not on file    Years of education: Not on file    Highest education level: Not on file   Tobacco Use    Smoking status: Never Smoker    Smokeless tobacco: Never Used   Substance and Sexual Activity    Alcohol use: Yes     Comment: occ    Drug use: No    Sexual activity: Never   Social History Narrative     2014, living alone now. Family History   Problem Relation Age of Onset    Heart Attack Mother     Heart Disease Mother     Heart Disease Father     Heart Disease Sister     Emphysema Sister        CT Results (most recent):  Results from Hospital Encounter encounter on 11/19/18   CTA CHEST W OR W WO CONT    Narrative EXAM:  CTA CHEST W OR W WO CONT  INDICATION:  eval for dissection. Additional history:  COMPARISON: None. .  TECHNIQUE:   Precontrast  images were obtained to localize the volume for acquisition. Multislice helical CT arteriography was performed from the diaphragm to the  thoracic inlet during uneventful rapid bolus intravenous contrast  administration. Lung and soft tissue windows were generated. Coronal and  sagittal images were generated and 3D post processing consisting of coronal  maximum intensity images was performed. CT dose reduction was achieved through use of a standardized protocol tailored  for this examination and automatic exposure control for dose modulation. Regla Sayda FINDINGS:  CHEST:  Chest wall/thoracic inlet: Within normal limits. Thyroid: Within normal limits. Mediastinum/iris: Within normal limits. Heart/vessels: No visible pulmonary embolus. Status post CABG with a LIMA graft. Lungs/Pleura: Within normal limits.   .  INCIDENTALLY IMAGED ABDOMEN:  Low-attenuation lesion in the posterior dome of the liver, incompletely  characterized, measuring approximately 1.7 cm in diameter. .  MSK:   Degenerative changes in the spine. .    Impression IMPRESSION:   1. No visualized pulmonary embolus. 2. Incidental findings as above     MRI Results (most recent):  No results found for this or any previous visit. MENTAL STATUS:    Orientation:    Eye Contact:    Motor Behavior:     Speech: Thought Process: Thought Content:    Suicidal ideations:    Mood:     Affect:     Concentration:     Abstraction:     Insight:       On the Modified Mini-Mental Status Exam: 87/100 = (34 %ile) Average      DIAGNOSTIC IMPRESSIONS:    ICD-10-CM ICD-9-CM    1. Moderate episode of recurrent major depressive disorder (HCC)  F33.1 296.32    2. Generalized anxiety disorder  F41.1 300.02    3. Memory deficits  R41.3 780.93              PLAN:  1. Complete a comprehensive neuropsychological assessment to provide a differential diagnosis of presenting concerns as well as to assist with disposition and treatment planning as appropriate. 2. Consider psychotherapy for depression and anxiety    96116 x 1 Review of records. Face to face interview w/ patient. Determine test protocol: 60 minutes. Total 1 unit        Chace Avendaño, PhD, ABPP, LCP  Licensed Clinical Psychologist/ Neuropsychologist        This note will not be viewable in [address]

## 2020-10-15 ENCOUNTER — OFFICE VISIT (OUTPATIENT)
Dept: NEUROLOGY | Age: 75
End: 2020-10-15
Payer: MEDICARE

## 2020-10-15 DIAGNOSIS — F33.1 MODERATE EPISODE OF RECURRENT MAJOR DEPRESSIVE DISORDER (HCC): ICD-10-CM

## 2020-10-15 DIAGNOSIS — F41.1 GENERALIZED ANXIETY DISORDER: ICD-10-CM

## 2020-10-15 DIAGNOSIS — F03.90 MAJOR NEUROCOGNITIVE DISORDER DUE TO ALZHEIMER'S DISEASE, PROBABLE, WITHOUT BEHAVIORAL DISTURBANCE: Primary | ICD-10-CM

## 2020-10-15 PROCEDURE — 96133 NRPSYC TST EVAL PHYS/QHP EA: CPT | Performed by: PSYCHOLOGIST

## 2020-10-15 PROCEDURE — 96136 PSYCL/NRPSYC TST PHY/QHP 1ST: CPT | Performed by: PSYCHOLOGIST

## 2020-10-15 PROCEDURE — 96137 PSYCL/NRPSYC TST PHY/QHP EA: CPT | Performed by: PSYCHOLOGIST

## 2020-10-15 PROCEDURE — 96132 NRPSYC TST EVAL PHYS/QHP 1ST: CPT | Performed by: PSYCHOLOGIST

## 2020-10-15 PROCEDURE — 96138 PSYCL/NRPSYC TECH 1ST: CPT | Performed by: PSYCHOLOGIST

## 2020-10-15 PROCEDURE — 96139 PSYCL/NRPSYC TST TECH EA: CPT | Performed by: PSYCHOLOGIST

## 2020-10-15 NOTE — PROGRESS NOTES
This note will not be viewable in [address] [facility] at [address]    Office:  [phone]  Fax: [phone]                                               Neuropsychological Evaluation Report    Patient Name: Rachel Her  Age: 69 y.o. Gender: female   Occupation: Retired  Handedness: Left handed   Presenting Concern: Memory problems vs Depression  Primary Care Physician: Lorena Mancia MD  Referring Provider: Lorena Mancia MD       PATIENT HISTORY (OBTAINED DURING INITIAL CLINICAL EVALUATION):    REASON FOR REFERRAL:  This comprehensive and medically necessary neuropsychological assessment was requested to assist with a differential diagnosis of dementia. The use and purpose of this examination, as well as the extent and limitations of confidentiality, were explained prior to obtaining permission to participate. Instructions were provided regarding the necessity to put forth optimal effort and answer questions truthfully in order to obtain reliable and accurate test results.     PERTINENT HISTORY:  Ms. Jenelle Mcgowan presented for a neuropsychological assessment at the recommendation of her treating physician secondary to complaints of memory loss. She has reported symptoms that include decreased attention, more withdrawn, sleep difficulties,, depression, anxiety, feeling slowed down, becoming aggressive towards others, and easily frustrated. Ms. Jenelle Mcgowan began noticing symptoms following the death of her  in 2014. From a brief review of her medical and personal history there has not been any other significant neurological injury or illness noted or reported. She did report experiencing depression or anxiety in the past.  She also has a sister with depression and anxiety.     Ms. Jenelle Mcgowan does not  report any problems at birth or difficulties meeting developmental milestones.  She reports that she had an adequate level of family support and was not subject to trauma or abuse as a child. Ms. Arjun Saravia does not  report being retain in school or receiving special assistance in any of she classes or subjects. Ms. Arjun Saravia completed 11 years of education. She reports that she has been  three times, and the first two were not good marriages.     Ms. Arjun Saravia does not  exercise on a regular basis and does not  maintain a balanced diet. She does report problems with sleep and does  complain of pain. She does not  participate in mentally stimulating activities. Ms. Arjun Saravia does not  have concerns or stressors at this time. Ms. Arjun Saravia indicated that she is independent in her instrumental activities of daily living, including shopping, meal preparation, housekeeping, doing laundry, driving a car, managing medications, and finances.       METHODS OF ASSESSMENT (Current Evaluation):  Clinician Administered:  Clinical Interview  Review of Medical Records  Clock Drawing Task  Modified Mini-Mental Status Exam (3MS)  Test of Premorbid [address] Depression Inventory  Revised Memory and Behavior Checklist    Technician Administered:  [place] Dementia Rating Scale-2  NAB Judgment  RBANS-A  Reliable Digit Span  Test of Memory Malingering  Test of Practical Judgment (9-Item)  [place] Functional Living Scale  Trail Making Test    TEST OBSERVATIONS:  Ms. Arjun Saravia arrived promptly for the testing session. Dress and grooming were appropriate; physical presentation was unchanged from that observed during the clinical interview. Speech was fluent, intelligible, and goal-directed. Affect was congruent with the euthymic mood conveyed. Ms. Arjun Saravia was adequately cooperative and appeared to put forth good effort throughout this examination. Rapport with the examiner was adequately established and maintained. Minimal prompting was required. Comprehension of test instructions was not problematic. Performance motivation was objectively measured by three instruments (TOMM, Reliable Digit Span, RBANS EI), and Ms. Nilam Herrera produced a normal score on these measures. Accordingly, test findings below do not appear to be the product of disingenuous effort. Given the above observations, plus comments contained in the Mental Status section, the results of this examination are regarded as reasonably reliable and valid. TEST RESULTS:  Quantitative test results are derived from comparisons to age and education corrected cohort normative data, where applicable. Percentiles are included in these instances. Qualitative test results are determined using clinical observations. General Orientation and Awareness:       Orientation to person, year, month, day of month, day of week, state, town, and circumstance.    Awareness of deficits:  Mildly Decreased                   Cognitive performance validity testing  Valid        Attention/Concentration:      Classification:      Coding (<0.1 percentile)           Severely Impaired  Simple visuomotor tracking (2 percentile)               Moderately Impaired  Digits forward (42 percentile)                 Average  Digits backward (21 percentile)                 Low Average    Visuospatial and Constructional Praxis:     Figure copy (5 percentile)                                                   Mildly Impaired  Line orientation (<0.1 percentile)                                                  Severely Impaired     Language:         Picture naming (1 percentile)                               Severely Impaired  Semantic fluency (7 percentile)                    Mildly Impaired    Memory and Learning:       Immediate word list learning (0.3 percentile)               Severely Impaired  Delayed word list recall (34 percentile)                  Average  Delayed word list recognition (42 percentile) Average  Immediate story memory (1 percentile)                  Severely Impaired  Delayed story recall (3 percentile)                Moderately Impaired  Delayed figure recall (6 percentile)                Mildly Impaired    Cognitive Tests of Executive Functioning:     Ability to think flexibly, Trail Making B (0.1 percentile)              Severely Impaired  Simple judgment in daily decision making (58 percentile)             Average  Complex judgment in daily decision making (20 percentile)              Low Average    [place] Dementia Rating Scale - 2:        Scale                           SS                   Percentile  Attention                             11                     71                                          Average  Initiation/Perseveration        2                     <1                                          Severely Impaired  Construction                       7                      18                                          Low Average  Conceptualization               9                      40                                          Average  Memory                              10                     59                                          Average  Total                                    4                      2                                            Moderately Impaired    Adaptive Behavioral Measure of Daily Functioning:   Time:    9 %ile   Money/calculations:   25 %ile  Communication:    <2 %ile   Memory:     25 %ile    Total:     T= 30 (2%ile)    Intellectual and Basic Cognitive Functioning (WAIS-IV):  ACS Test of pre-morbid functioning reading recognition lower limit estimated WAIS-IV FSIQ score:       Estimated full scale IQ:            7 percentile     Mildly Impaired    Emotional Functioning:   HADS:    Depression = 10 Elevated Depressive mood        Anxiety  =  12 Significant Anxiety     LUDIVINA:       18    Moderate Anxiety   BDI-2:   27    Severe Depression    IMPRESSIONS:  Ms. Will Parker was seen for a comprehensive neuropsychological evaluation and administered a battery of measures assessing the cognitive domains of attention, language, memory, visual-spatial, and executive functioning. Her overall level of functioning was in the moderately impaired range, with her individual cognitive domains ranging from severely impaired to low average. There was clear indications of processing information deficits, visual-spatial/constructional praxis deficits, impaired confrontational naming, as well as immediate memory and learning difficulties. Though she appears to have retained her problem-solving capabilities, her cognitive flexibility has significantly declined. She was administered measures of basic cognitive ability and her performance overall was in the moderately impaired range. This was also true for with her performance on adaptive daily functioning measures. These findings are consistent with a decline from Ms. Edmonds estimated level of functioning. Based on her cognitive performance profile she is likely experiencing an early stage of dementia. Additionally, Ms. Hanson is experiencing significant levels of anxiety and depression. This is likely exacerbating her cognitive decline. Interventions she initially focus on increasing her mood and then possibly a referral to SLP for training in compensatory strategies. DIAGNOSTIC IMPRESSIONS:    ICD-10-CM ICD-9-CM    1.  Major neurocognitive disorder due to Alzheimer's disease, probable, without behavioral disturbance ([facility])  F01.50 331.0 NM NEUROPSYCHOLOGICAL TST EVAL PHYS/QHP 1ST HOUR     294.10 NM NEUROPSYCHOLOGICAL TST EVAL PHYS/QHP EA ADDL HR      NM PSYL/NRPSYCL TST PHYS/QHP 2+ TST 1ST 30 MIN      NM PSYCL/NRPSYCL TST PHYS/QHP 2+ TST EA ADDL 30 MIN      NM PSYCL/NRPSYCL TST TECH 2+ TST 1ST 30 MIN      NM PSYCL/NRPSYCL TST TECH 2+ TST EA ADDL 30 MIN      NM PSYCL/NRPSYCL TST TECH 2+ TST EA ADDL 30 MIN ID PSYCL/NRPSYCL TST TECH 2+ TST EA ADDL 30 MIN      ID PSYCL/NRPSYCL TST TECH 2+ TST EA ADDL 30 MIN      ID PSYCL/NRPSYCL TST TECH 2+ TST EA ADDL 30 MIN   2. Moderate episode of recurrent major depressive disorder (HCC)  F33.1 296.32 ID NEUROPSYCHOLOGICAL TST EVAL PHYS/QHP 1ST HOUR      ID NEUROPSYCHOLOGICAL TST EVAL PHYS/QHP EA ADDL HR      ID PSYL/NRPSYCL TST PHYS/QHP 2+ TST 1ST 30 MIN      ID PSYCL/NRPSYCL TST PHYS/QHP 2+ TST EA ADDL 30 MIN      ID PSYCL/NRPSYCL TST TECH 2+ TST 1ST 30 MIN   3. Generalized anxiety disorder  F41.1 300.02 ID NEUROPSYCHOLOGICAL TST EVAL PHYS/QHP 1ST HOUR      ID NEUROPSYCHOLOGICAL TST EVAL PHYS/QHP EA ADDL HR      ID PSYL/NRPSYCL TST PHYS/QHP 2+ TST 1ST 30 MIN      ID PSYCL/NRPSYCL TST PHYS/QHP 2+ TST EA ADDL 30 MIN      ID PSYCL/NRPSYCL TST TECH 2+ TST 1ST 30 MIN         RECOMMENDATIONS:   1. Findings should be reviewed with Ms. Katie Hammonds to insure her understanding and discuss the potential implications. 2. Emphasis should be placed on Ms. Katie Hammonds obtaining good sleep hygiene, adequate nutrition, and maintaining adequate physical exercise to promote good brain health. 3. Ms. Katie Hammonds may benefit from a referral to psychotherapy to address anxiety and depression and work on adequate stress management skills. 4. Ms. Katie Hammonds is encouraged to seek out various forms of mental stimulation that would help to \"exercise\" her brain. This might include learning a new skill, hobby, travel, attending lectures, or evening reading or listening to podcasts or videos on topics of her interest.  5. Consider referral to Speech/language Pathology for cognitive-linguistic interventions. Thank you for allowing me the opportunity to assist you in Ms. Botello's care. Please do not hesitate to contact me should you have additional questions that I may not have addressed.     48726 x 1  96138 x 1  96139 x 6  96132 x 1  96133 x 2          Kiel Maloney, Ph.D., ABPP  Licensed Clinical Psychologist  Neuropsychologist  Board Certified Rehabilitation Psychologist      Time Documentation:     70960 x 1: Neurobehavioral Status Exam/Clinical Interview: (1 hour, (already billed on first date of service)     44895*0 Neuropsych testing/data gathering by Neuropsychologist (35 additional minutes, see above)      96138 x 1  96139 x 6 Test Administration/Data Gathering By Technician: (3.5 hours). 30284 x 1 (first 30 minutes), 69426 x 7 (each additional 30 minutes)     96132 x 1  96133 x 1 Testing Evaluation Services by Neuropsychologist (1 hour, 50 minutes) 96132 x 1 (first hour), 96133 x 1 (50 minutes)     Definitions:       02986/42598:  Neurobehavioral Status Exam, Clinical interview. Clinical assessment of thinking, reasoning and judgment, by neuropsychologist, both face to face time with patient and time interpreting those test results and reporting, first and subsequent hours)     53677/01051: Neuropsychological Test Administration by Technician/Psychometrist, first 30 minutes and each additional 30 minutes. The above includes: Record review. Review of history provided by patient. Review of collaborative information. Testing by Clinician. Review of raw data. Scoring. Report writing of individual tests administered by Clinician. Integration of individual tests administered by psychometrist with NSE/testing by clinician, review of records/history/collaborative information, case Conceptualization, treatment planning, clinical decision making, report writing, coordination Of Care. Psychometry test codes as time spent by psychometrist administering and scoring neurocognitive/psychological tests under supervision of neuropsychologist.  Integral services including scoring of raw data, data interpretation, case conceptualization, report writing etcetera were initiated after the patient finished testing/raw data collected and was completed on the date the report was signed.     This note will not be viewable in [address]

## 2020-10-15 NOTE — LETTER
10/24/20    Patient: Svetlana Hernandez   YOB: 1945   Date of Visit: 10/15/2020     Ashkan Mancia MD  [address] [place] [address] [address] Sosa [address] [facility]    Dear Paul [place] MD,      Thank you for referring Ms. Svetlana Hernandez to [address] [place] [address] for evaluation. My notes for this consultation are attached. This note will not be viewable in [facility] [facility] at [place] [address] [address] [place] [address]    Office:  [phone]  Fax: [phone]                                               Neuropsychological Evaluation Report    Patient Name: Svetlana Hernandez  Age: 69 y.o. Gender: female   Occupation: Retired  Handedness: Left handed   Presenting Concern: Memory problems vs Depression  Primary Care Physician: Ashkan Mancia MD  Referring Provider: Ashkan Mancia MD       PATIENT HISTORY (OBTAINED DURING INITIAL CLINICAL EVALUATION):    REASON FOR REFERRAL:  This comprehensive and medically necessary neuropsychological assessment was requested to assist with a differential diagnosis of dementia. The use and purpose of this examination, as well as the extent and limitations of confidentiality, were explained prior to obtaining permission to participate. Instructions were provided regarding the necessity to put forth optimal effort and answer questions truthfully in order to obtain reliable and accurate test results.     PERTINENT HISTORY:  Ms. Margret Guadalupe presented for a neuropsychological assessment at the recommendation of her treating physician secondary to complaints of memory loss. She has reported symptoms that include decreased attention, more withdrawn, sleep difficulties,, depression, anxiety, feeling slowed down, becoming aggressive towards others, and easily frustrated. Ms. Margret Guadalupe began noticing symptoms following the death of her  in 2014.   From a brief review of her medical and personal history there has not been any other significant neurological injury or illness noted or reported. She did report experiencing depression or anxiety in the past.  She also has a sister with depression and anxiety.     Ms. Cari Bey does not  report any problems at birth or difficulties meeting developmental milestones. She reports that she had an adequate level of family support and was not subject to trauma or abuse as a child. Ms. Cari Bey does not  report being retain in school or receiving special assistance in any of she classes or subjects. Ms. Cari Bey completed 11 years of education. She reports that she has been  three times, and the first two were not good marriages.     Ms. Cari Bey does not  exercise on a regular basis and does not  maintain a balanced diet. She does report problems with sleep and does  complain of pain. She does not  participate in mentally stimulating activities. Ms. Cari Bey does not  have concerns or stressors at this time. Ms. Cari Bey indicated that she is independent in her instrumental activities of daily living, including shopping, meal preparation, housekeeping, doing laundry, driving a car, managing medications, and finances.       METHODS OF ASSESSMENT (Current Evaluation):  Clinician Administered:  Clinical Interview  Review of Medical Records  Clock Drawing Task  Modified Mini-Mental Status Exam (3MS)  Test of Premorbid [address] Depression Inventory  Revised Memory and Behavior Checklist    Technician Administered:  [place] Dementia Rating Scale-2  NAB Judgment  RBANS-A  Reliable Digit Span  Test of Memory Malingering  Test of Practical Judgment (9-Item)  [place] Functional Living Scale  Trail Making Test    TEST OBSERVATIONS:  Ms. Cari Bey arrived promptly for the testing session.   Dress and grooming were appropriate; physical presentation was unchanged from that observed during the clinical interview. Speech was fluent, intelligible, and goal-directed. Affect was congruent with the euthymic mood conveyed. Ms. Erin Rodriguez was adequately cooperative and appeared to put forth good effort throughout this examination. Rapport with the examiner was adequately established and maintained. Minimal prompting was required. Comprehension of test instructions was not problematic. Performance motivation was objectively measured by three instruments (TOMM, Reliable Digit Span, RBANS EI), and Ms. Erin Rodriguez produced a normal score on these measures. Accordingly, test findings below do not appear to be the product of disingenuous effort. Given the above observations, plus comments contained in the Mental Status section, the results of this examination are regarded as reasonably reliable and valid. TEST RESULTS:  Quantitative test results are derived from comparisons to age and education corrected cohort normative data, where applicable. Percentiles are included in these instances. Qualitative test results are determined using clinical observations. General Orientation and Awareness:       Orientation to person, year, month, day of month, day of week, state, town, and circumstance.    Awareness of deficits:  Mildly Decreased                   Cognitive performance validity testing  Valid        Attention/Concentration:      Classification:      Coding (<0.1 percentile)           Severely Impaired  Simple visuomotor tracking (2 percentile)               Moderately Impaired  Digits forward (42 percentile)                 Average  Digits backward (21 percentile)                 Low Average    Visuospatial and Constructional Praxis:     Figure copy (5 percentile)                                                   Mildly Impaired  Line orientation (<0.1 percentile)                                                  Severely Impaired     Language:         Picture naming (1 percentile)                               Severely Impaired  Semantic fluency (7 percentile)                    Mildly Impaired    Memory and Learning:       Immediate word list learning (0.3 percentile)               Severely Impaired  Delayed word list recall (34 percentile)                  Average  Delayed word list recognition (42 percentile)               Average  Immediate story memory (1 percentile)                  Severely Impaired  Delayed story recall (3 percentile)                Moderately Impaired  Delayed figure recall (6 percentile)                Mildly Impaired    Cognitive Tests of Executive Functioning:     Ability to think flexibly, Trail Making B (0.1 percentile)              Severely Impaired  Simple judgment in daily decision making (58 percentile)             Average  Complex judgment in daily decision making (20 percentile)              Low Average    [place]ago Dementia Rating Scale - 2:        Scale                           SS                   Percentile  Attention                             11                     71                                          Average  Initiation/Perseveration        2                     <1                                          Severely Impaired  Construction                       7                      18                                          Low Average  Conceptualization               9                      40                                          Average  Memory                              10                     59                                          Average  Total                                    4                      2                                            Moderately Impaired    Adaptive Behavioral Measure of Daily Functioning:   Time:    9 %ile   Money/calculations:   25 %ile  Communication:    <2 %ile   Memory:     25 %ile    Total:     T= 30 (2%ile)    Intellectual and Basic Cognitive Functioning (WAIS-IV):  ACS Test of pre-morbid functioning reading recognition lower limit estimated WAIS-IV FSIQ score:       Estimated full scale IQ:            7 percentile     Mildly Impaired    Emotional Functioning:   HADS:    Depression = 10 Elevated Depressive mood        Anxiety  =  12 Significant Anxiety     LUDIVINA:       18    Moderate Anxiety   BDI-2:   27    Severe Depression    IMPRESSIONS:  Ms. Marlene Bagley was seen for a comprehensive neuropsychological evaluation and administered a battery of measures assessing the cognitive domains of attention, language, memory, visual-spatial, and executive functioning. Her overall level of functioning was in the moderately impaired range, with her individual cognitive domains ranging from severely impaired to low average. There was clear indications of processing information deficits, visual-spatial/constructional praxis deficits, impaired confrontational naming, as well as immediate memory and learning difficulties. Though she appears to have retained her problem-solving capabilities, her cognitive flexibility has significantly declined. She was administered measures of basic cognitive ability and her performance overall was in the moderately impaired range. This was also true for with her performance on adaptive daily functioning measures. These findings are consistent with a decline from Ms. Edmonds estimated level of functioning. Based on her cognitive performance profile she is likely experiencing an early stage of dementia. Additionally, Ms. Hanson is experiencing significant levels of anxiety and depression. This is likely exacerbating her cognitive decline. Interventions she initially focus on increasing her mood and then possibly a referral to SLP for training in compensatory strategies. DIAGNOSTIC IMPRESSIONS:    ICD-10-CM ICD-9-CM    1.  Major neurocognitive disorder due to Alzheimer's disease, probable, without behavioral disturbance ([facility])  F01.50 331.0 OR NEUROPSYCHOLOGICAL TST EVAL PHYS/QHP 1ST HOUR     294.10 [place] NEUROPSYCHOLOGICAL TST EVAL PHYS/QHP EA ADDL HR      MN PSYL/NRPSYCL TST PHYS/QHP 2+ TST 1ST 30 MIN      MN PSYCL/NRPSYCL TST PHYS/QHP 2+ TST EA ADDL 30 MIN      MN PSYCL/NRPSYCL TST TECH 2+ TST 1ST 30 MIN      MN PSYCL/NRPSYCL TST TECH 2+ TST EA ADDL 30 MIN      MN PSYCL/NRPSYCL TST TECH 2+ TST EA ADDL 30 MIN      MN PSYCL/NRPSYCL TST TECH 2+ TST EA ADDL 30 MIN      MN PSYCL/NRPSYCL TST TECH 2+ TST EA ADDL 30 MIN      MN PSYCL/NRPSYCL TST TECH 2+ TST EA ADDL 30 MIN   2. Moderate episode of recurrent major depressive disorder (HCC)  F33.1 296.32 [place] NEUROPSYCHOLOGICAL TST EVAL PHYS/QHP 1ST HOUR      MN NEUROPSYCHOLOGICAL TST EVAL PHYS/QHP EA ADDL HR      MN PSYL/NRPSYCL TST PHYS/QHP 2+ TST 1ST 30 MIN      MN PSYCL/NRPSYCL TST PHYS/QHP 2+ TST EA ADDL 30 MIN      MN PSYCL/NRPSYCL TST TECH 2+ TST 1ST 30 MIN   3. Generalized anxiety disorder  F41.1 300.02 [place] NEUROPSYCHOLOGICAL TST EVAL PHYS/QHP 1ST HOUR      [place] NEUROPSYCHOLOGICAL TST EVAL PHYS/QHP EA ADDL HR      MN PSYL/NRPSYCL TST PHYS/QHP 2+ TST 1ST 30 MIN      MN PSYCL/NRPSYCL TST PHYS/QHP 2+ TST EA ADDL 30 MIN      MN PSYCL/NRPSYCL TST TECH 2+ TST 1ST 30 MIN         RECOMMENDATIONS:   1. Findings should be reviewed with Ms. Trevin Gaytan to insure her understanding and discuss the potential implications. 2. Emphasis should be placed on Ms. Trevin Gaytan obtaining good sleep hygiene, adequate nutrition, and maintaining adequate physical exercise to promote good brain health. 3. Ms. Trevin Gaytan may benefit from a referral to psychotherapy to address anxiety and depression and work on adequate stress management skills. 4. Ms. Trevin Gaytan is encouraged to seek out various forms of mental stimulation that would help to \"exercise\" her brain.  This might include learning a new skill, hobby, travel, attending lectures, or evening reading or listening to podcasts or videos on topics of her interest.  5. Consider referral to Speech/language Pathology for cognitive-linguistic interventions. Thank you for allowing me the opportunity to assist you in Ms. Botello's care. Please do not hesitate to contact me should you have additional questions that I may not have addressed. 48310 x 1  96138 x 1  96139 x 6  96132 x 1  96133 x 2          Lexy Machuca, Ph.D., ABPP  Licensed Clinical Psychologist  Neuropsychologist  Board Certified Rehabilitation Psychologist      Time Documentation:     09281 x 1: Neurobehavioral Status Exam/Clinical Interview: (1 hour, (already billed on first date of service)     13140*0 Neuropsych testing/data gathering by Neuropsychologist (35 additional minutes, see above)      96138 x 1  96139 x 6 Test Administration/Data Gathering By Technician: (3.5 hours). 47806 x 1 (first 30 minutes), 74891 x 7 (each additional 30 minutes)     96132 x 1  96133 x 1 Testing Evaluation Services by Neuropsychologist (1 hour, 50 minutes) 96132 x 1 (first hour), 96133 x 1 (50 minutes)     Definitions:       49792/96795:  Neurobehavioral Status Exam, Clinical interview. Clinical assessment of thinking, reasoning and judgment, by neuropsychologist, both face to face time with patient and time interpreting those test results and reporting, first and subsequent hours)     11184/62591: Neuropsychological Test Administration by Technician/Psychometrist, first 30 minutes and each additional 30 minutes. The above includes: Record review. Review of history provided by patient. Review of collaborative information. Testing by Clinician. Review of raw data. Scoring. Report writing of individual tests administered by Clinician. Integration of individual tests administered by psychometrist with NSE/testing by clinician, review of records/history/collaborative information, case Conceptualization, treatment planning, clinical decision making, report writing, coordination Of Care.  Psychometry test codes as time spent by psychometrist administering and scoring neurocognitive/psychological tests under supervision of neuropsychologist.  Integral services including scoring of raw data, data interpretation, case conceptualization, report writing etcetera were initiated after the patient finished testing/raw data collected and was completed on the date the report was signed. This note will not be viewable in [address] If you have questions, please do not hesitate to call me. I look forward to following your patient along with you.       Sincerely,    Janis Hill, PHD

## 2020-11-04 ENCOUNTER — OFFICE VISIT (OUTPATIENT)
Dept: NEUROLOGY | Age: 75
End: 2020-11-04
Payer: MEDICARE

## 2020-11-04 DIAGNOSIS — F33.1 MODERATE EPISODE OF RECURRENT MAJOR DEPRESSIVE DISORDER (HCC): Primary | ICD-10-CM

## 2020-11-04 DIAGNOSIS — F41.1 GENERALIZED ANXIETY DISORDER: ICD-10-CM

## 2020-11-04 DIAGNOSIS — F03.90 MAJOR NEUROCOGNITIVE DISORDER DUE TO ALZHEIMER'S DISEASE, PROBABLE, WITHOUT BEHAVIORAL DISTURBANCE: ICD-10-CM

## 2020-11-04 PROCEDURE — 90832 PSYTX W PT 30 MINUTES: CPT | Performed by: PSYCHOLOGIST

## 2020-11-04 NOTE — PROGRESS NOTES
Yaniv Kimball is a 69 y.o. female who presents today for feedback following recent neuropsychological testing. The results were reviewed of the recent neuropsychological evaluation, including discussing individual tests as well as the patient's areas of neurocognitive strength versus their weaknesses. Education was provided regarding my diagnostic impressions, and we discussed next steps for further evaluation down the road. I all also answered numerous questions related to the clinical findings, including discussing various methods to improve cognitive cognition and mood when relevant. The patient is encouraged to follow-up with the referring provider. DIAGNOSTIC IMPRESSIONS:    ICD-10-CM ICD-9-CM    1. Major neurocognitive disorder due to Alzheimer's disease, probable, without behavioral disturbance ([facility] [address]  F01.50 331.0      294.10    2. Moderate episode of recurrent major depressive disorder (HCC)  F33.1 296.32    3.  Generalized anxiety disorder  F41.1 300.02        34749 30 minutes x 1    Edu Mohamud, PHD

## 2020-11-05 ENCOUNTER — OFFICE VISIT (OUTPATIENT)
Dept: FAMILY MEDICINE CLINIC | Age: 75
End: 2020-11-05
Payer: MEDICARE

## 2020-11-05 VITALS
TEMPERATURE: 96.9 F | DIASTOLIC BLOOD PRESSURE: 75 MMHG | RESPIRATION RATE: 18 BRPM | HEART RATE: 67 BPM | OXYGEN SATURATION: 97 % | WEIGHT: 145.2 LBS | SYSTOLIC BLOOD PRESSURE: 114 MMHG | HEIGHT: 65 IN | BODY MASS INDEX: 24.19 KG/M2

## 2020-11-05 DIAGNOSIS — F32.A ANXIETY AND DEPRESSION: ICD-10-CM

## 2020-11-05 DIAGNOSIS — R41.3 MEMORY CHANGES: Primary | ICD-10-CM

## 2020-11-05 DIAGNOSIS — F41.9 ANXIETY AND DEPRESSION: ICD-10-CM

## 2020-11-05 PROCEDURE — 3017F COLORECTAL CA SCREEN DOC REV: CPT | Performed by: FAMILY MEDICINE

## 2020-11-05 PROCEDURE — G0463 HOSPITAL OUTPT CLINIC VISIT: HCPCS | Performed by: FAMILY MEDICINE

## 2020-11-05 PROCEDURE — G9717 DOC PT DX DEP/BP F/U NT REQ: HCPCS | Performed by: FAMILY MEDICINE

## 2020-11-05 PROCEDURE — G9899 SCRN MAM PERF RSLTS DOC: HCPCS | Performed by: FAMILY MEDICINE

## 2020-11-05 PROCEDURE — G8420 CALC BMI NORM PARAMETERS: HCPCS | Performed by: FAMILY MEDICINE

## 2020-11-05 PROCEDURE — G8399 PT W/DXA RESULTS DOCUMENT: HCPCS | Performed by: FAMILY MEDICINE

## 2020-11-05 PROCEDURE — 3288F FALL RISK ASSESSMENT DOCD: CPT | Performed by: FAMILY MEDICINE

## 2020-11-05 PROCEDURE — G8536 NO DOC ELDER MAL SCRN: HCPCS | Performed by: FAMILY MEDICINE

## 2020-11-05 PROCEDURE — 1100F PTFALLS ASSESS-DOCD GE2>/YR: CPT | Performed by: FAMILY MEDICINE

## 2020-11-05 PROCEDURE — 1090F PRES/ABSN URINE INCON ASSESS: CPT | Performed by: FAMILY MEDICINE

## 2020-11-05 PROCEDURE — G8427 DOCREV CUR MEDS BY ELIG CLIN: HCPCS | Performed by: FAMILY MEDICINE

## 2020-11-05 PROCEDURE — 99214 OFFICE O/P EST MOD 30 MIN: CPT | Performed by: FAMILY MEDICINE

## 2020-11-05 PROCEDURE — G8754 DIAS BP LESS 90: HCPCS | Performed by: FAMILY MEDICINE

## 2020-11-05 PROCEDURE — G8752 SYS BP LESS 140: HCPCS | Performed by: FAMILY MEDICINE

## 2020-11-05 RX ORDER — BUPROPION HYDROCHLORIDE 150 MG/1
150 TABLET ORAL
Qty: 30 TAB | Refills: 0 | Status: SHIPPED | OUTPATIENT
Start: 2020-11-05 | End: 2020-11-19 | Stop reason: SDUPTHER

## 2020-11-05 NOTE — PROGRESS NOTES
1. Have you been to the ER, urgent care clinic since your last visit? Hospitalized since your last visit? No    2. Have you seen or consulted any other health care providers outside of the [address] [place] [address] since your last visit? Include any pap smears or colon screening. No     Health Maintenance Due   Topic Date Due    GLAUCOMA SCREENING Q2Y  07/09/2020     Eye exam Dr. Cooper Flynn 2 weeks ago. Do you have an [address] in place in the event that you have a healthcare crisis that could impact your decision making as it pertains to your health? NO    Would you like information about Advance Care Planning? NO    Information given.  NO

## 2020-11-05 NOTE — PROGRESS NOTES
Chief Complaint   Patient presents with    Medication Evaluation     Pt has been followed by Dr. Aj Fitch. Pt was diagnosed with depression and anxiety, along with cognitive difficulties, likely early dementia. Pt wants to treat the depression and anxiety first.     Pt used to be on Paxil and lexapro, does not feel like either one made a difference. Pt is under a lot of stress. Pt reports that it takes her all day to get tasks done, very low energy. Worries constantly. Pt does not sleep well. Subjective: (As above and below)     Chief Complaint   Patient presents with    Medication Evaluation     she is a 69y.o. year old female who presents for evaluation. Reviewed PmHx, RxHx, FmHx, SocHx, AllgHx and updated in chart. Review of Systems - negative except as listed above    Objective:     Vitals:    11/05/20 1023   BP: 114/75   Pulse: 67   Resp: 18   Temp: 96.9 °F (36.1 °C)   TempSrc: Temporal   SpO2: 97%   Weight: 145 lb 3.2 oz (65.9 kg)   Height: 5' 5\" (1.651 m)     Physical Examination: General appearance - alert, well appearing, and in no distress  Mental status - depressed mood  Ears - bilateral TM's and external ear canals normal  Chest - clear to auscultation, no wheezes, rales or rhonchi, symmetric air entry  Heart - normal rate, regular rhythm, normal S1, S2, no murmurs, rubs, clicks or gallops  Musculoskeletal - no joint tenderness, deformity or swelling  Extremities - peripheral pulses normal, no pedal edema, no clubbing or cyanosis    Assessment/ Plan:   1. Memory changes  -pt wants to try something different than SSRI, will start on trial of wellbutrin to help with energy  -monitor memory changes  - buPROPion XL (WELLBUTRIN XL) 150 mg tablet; Take 1 Tab by mouth every morning. Dispense: 30 Tab; Refill: 0    2. Anxiety and depression  - buPROPion XL (WELLBUTRIN XL) 150 mg tablet; Take 1 Tab by mouth every morning. Dispense: 30 Tab;  Refill: 0     Follow up   I have discussed the diagnosis with the patient and the intended plan as seen in the above orders. The patient has received an after-visit summary and questions were answered concerning future plans.      Medication Side Effects and Warnings were discussed with patient: yes  Patient Labs were reviewed: yes  Patient Past Records were reviewed:  yes    Mitali Donis M.D.

## 2020-11-19 ENCOUNTER — OFFICE VISIT (OUTPATIENT)
Dept: FAMILY MEDICINE CLINIC | Age: 75
End: 2020-11-19
Payer: MEDICARE

## 2020-11-19 VITALS
HEIGHT: 65 IN | HEART RATE: 62 BPM | WEIGHT: 145 LBS | SYSTOLIC BLOOD PRESSURE: 128 MMHG | RESPIRATION RATE: 16 BRPM | OXYGEN SATURATION: 97 % | BODY MASS INDEX: 24.16 KG/M2 | DIASTOLIC BLOOD PRESSURE: 78 MMHG | TEMPERATURE: 98.1 F

## 2020-11-19 DIAGNOSIS — R41.3 MEMORY CHANGES: ICD-10-CM

## 2020-11-19 DIAGNOSIS — F41.9 ANXIETY AND DEPRESSION: ICD-10-CM

## 2020-11-19 DIAGNOSIS — F32.A ANXIETY AND DEPRESSION: ICD-10-CM

## 2020-11-19 PROCEDURE — 3017F COLORECTAL CA SCREEN DOC REV: CPT | Performed by: FAMILY MEDICINE

## 2020-11-19 PROCEDURE — G8399 PT W/DXA RESULTS DOCUMENT: HCPCS | Performed by: FAMILY MEDICINE

## 2020-11-19 PROCEDURE — G8754 DIAS BP LESS 90: HCPCS | Performed by: FAMILY MEDICINE

## 2020-11-19 PROCEDURE — G9899 SCRN MAM PERF RSLTS DOC: HCPCS | Performed by: FAMILY MEDICINE

## 2020-11-19 PROCEDURE — G0463 HOSPITAL OUTPT CLINIC VISIT: HCPCS | Performed by: FAMILY MEDICINE

## 2020-11-19 PROCEDURE — 3288F FALL RISK ASSESSMENT DOCD: CPT | Performed by: FAMILY MEDICINE

## 2020-11-19 PROCEDURE — 99213 OFFICE O/P EST LOW 20 MIN: CPT | Performed by: FAMILY MEDICINE

## 2020-11-19 PROCEDURE — 1100F PTFALLS ASSESS-DOCD GE2>/YR: CPT | Performed by: FAMILY MEDICINE

## 2020-11-19 PROCEDURE — G8427 DOCREV CUR MEDS BY ELIG CLIN: HCPCS | Performed by: FAMILY MEDICINE

## 2020-11-19 PROCEDURE — G8420 CALC BMI NORM PARAMETERS: HCPCS | Performed by: FAMILY MEDICINE

## 2020-11-19 PROCEDURE — G8752 SYS BP LESS 140: HCPCS | Performed by: FAMILY MEDICINE

## 2020-11-19 PROCEDURE — G8536 NO DOC ELDER MAL SCRN: HCPCS | Performed by: FAMILY MEDICINE

## 2020-11-19 PROCEDURE — 1090F PRES/ABSN URINE INCON ASSESS: CPT | Performed by: FAMILY MEDICINE

## 2020-11-19 PROCEDURE — G8432 DEP SCR NOT DOC, RNG: HCPCS | Performed by: FAMILY MEDICINE

## 2020-11-19 RX ORDER — BUPROPION HYDROCHLORIDE 300 MG/1
300 TABLET ORAL
Qty: 30 TAB | Refills: 5 | Status: SHIPPED | OUTPATIENT
Start: 2020-11-19 | End: 2021-01-05 | Stop reason: ALTCHOICE

## 2020-11-19 NOTE — PROGRESS NOTES
Chief Complaint   Patient presents with    Medication Evaluation     follow-up     Pt is here today to follow up on Wellbutrin, pt has been on medication almost 2 weeks, denies noticing any changes. Only possible side effect is constipation. Pt was previously on lexapro and paxil, reported no change with those medications. Pt reports that she still has very little energy, has a hard time completing tasks. Pt is taking a stool softener daily to help with side effects. Subjective: (As above and below)     Chief Complaint   Patient presents with    Medication Evaluation     follow-up     she is a 69y.o. year old female who presents for evaluation. Reviewed PmHx, RxHx, FmHx, SocHx, AllgHx and updated in chart. Review of Systems - negative except as listed above    Objective:     Vitals:    11/19/20 1014   BP: 128/78   Pulse: 62   Resp: 16   Temp: 98.1 °F (36.7 °C)   TempSrc: Temporal   SpO2: 97%   Weight: 145 lb (65.8 kg)   Height: 5' 5\" (1.651 m)     Physical Examination: General appearance - alert, well appearing, and in no distress  Mental status - depressed mood  Ears - bilateral TM's and external ear canals normal  Chest - clear to auscultation, no wheezes, rales or rhonchi, symmetric air entry  Heart - normal rate, regular rhythm, normal S1, S2, no murmurs, rubs, clicks or gallops  Musculoskeletal - no joint tenderness, deformity or swelling  Extremities - peripheral pulses normal, no pedal edema, no clubbing or cyanosis    Assessment/ Plan:   1. Memory changes  -increase from 150 to 300, no change seen with initial dosing  -continue on stool softener or miralax for side effect management  -follow up as needed  - buPROPion XL (WELLBUTRIN XL) 300 mg XL tablet; Take 1 Tab by mouth every morning. Dispense: 30 Tab; Refill: 5    2. Anxiety and depression  - buPROPion XL (WELLBUTRIN XL) 300 mg XL tablet; Take 1 Tab by mouth every morning. Dispense: 30 Tab;  Refill: 5       I have discussed the diagnosis with the patient and the intended plan as seen in the above orders. The patient has received an after-visit summary and questions were answered concerning future plans.      Medication Side Effects and Warnings were discussed with patient: yes  Patient Labs were reviewed: yes  Patient Past Records were reviewed:  yes    Marylu Jordan M.D.

## 2020-11-19 NOTE — PROGRESS NOTES
Chief Complaint   Patient presents with    Medication Evaluation     follow-up     Health Maintenance reviewed     1. Have you been to the ER, urgent care clinic since your last visit? Hospitalized since your last visit? No     2. Have you seen or consulted any other health care providers outside of the [address] [place] [address] since your last visit? Include any pap smears or colon screening.   No

## 2020-11-24 ENCOUNTER — TRANSCRIBE ORDER (OUTPATIENT)
Dept: INTERNAL MEDICINE CLINIC | Age: 75
End: 2020-11-24

## 2020-11-24 ENCOUNTER — TELEPHONE (OUTPATIENT)
Dept: FAMILY MEDICINE CLINIC | Age: 75
End: 2020-11-24

## 2020-11-24 NOTE — TELEPHONE ENCOUNTER
----- Message from Lorne Teixeira sent at 11/24/2020  9:59 AM EST -----  Regarding: Kai Mancia MD  General Message/Vendor Calls    Caller's first and last name:  Tatum Farrell [[id number]]      Reason for call: Consultation Request      Callback required yes/no and why: Yes      Best contact number(s): [phone]       Details to clarify the request: pt would like callback from Stephon Goodman MD regarding rx reaction at [address]

## 2020-11-24 NOTE — TELEPHONE ENCOUNTER
From Carrie Melendez, [address] [facility]               General Message/Vendor Calls     Caller's first and last name: Pt       Reason for call: medication reaction       Callback required yes/no and why: Yes, discuss if pt should continue medication       Best contact number(s): [phone]       Details to clarify the request: Pt was prescribed Wellbutrin 300mg on 11/19 and said since she has started taking it, it's been making her stomach upset. She said she vomited a couple times this morning. Would like to speak w/ nurse. Please advise.        Lian Joyner

## 2020-11-25 NOTE — TELEPHONE ENCOUNTER
Pt states the wellbutrin 300 mg makes her sick every time she takes it she is taking 2 150 tablets.  Its making her nausea and she did vomit one morning

## 2020-11-25 NOTE — TELEPHONE ENCOUNTER
Please advise patient to reduce back to 150 mg daily. We can try an alternate medication if patient is not getting enough benefit from the 150 mg dose.   Please advise

## 2020-12-28 ENCOUNTER — TELEPHONE (OUTPATIENT)
Dept: FAMILY MEDICINE CLINIC | Age: 75
End: 2020-12-28

## 2020-12-28 NOTE — TELEPHONE ENCOUNTER
Ramona states since pt started wellbutrin she has gone downhill. She called them around 5am stated she couldn't walk her legs were really weak. This morning when she got up she states now she can walk and her appetite has decreased they have now started her on ensure and she has moved in with them last night and they are making sure she eats and drinks. Ramona states she will no longer be by herself. She's thinking her mom is not properly taking care of her self and taking her medications incorrectly that maybe the reason she is going downhill so fast. She would like to come in to see Blanche or Chantell Lopez to talk about her mom medications and currently state. She will call back to schedule appointment after she speaks with her sister.  Offered a VV with Dr. Christel Schroeder tomorrow or in office appointment with Blanche on Monday

## 2021-01-05 ENCOUNTER — OFFICE VISIT (OUTPATIENT)
Dept: FAMILY MEDICINE CLINIC | Age: 76
End: 2021-01-05
Payer: MEDICARE

## 2021-01-05 ENCOUNTER — HOSPITAL ENCOUNTER (OUTPATIENT)
Dept: LAB | Age: 76
Discharge: HOME | End: 2021-01-05
Payer: MEDICARE

## 2021-01-05 VITALS
HEIGHT: 65 IN | DIASTOLIC BLOOD PRESSURE: 83 MMHG | WEIGHT: 136 LBS | SYSTOLIC BLOOD PRESSURE: 139 MMHG | RESPIRATION RATE: 17 BRPM | OXYGEN SATURATION: 95 % | HEART RATE: 65 BPM | BODY MASS INDEX: 22.66 KG/M2 | TEMPERATURE: 97.1 F

## 2021-01-05 DIAGNOSIS — F32.A ANXIETY AND DEPRESSION: ICD-10-CM

## 2021-01-05 DIAGNOSIS — I10 ESSENTIAL HYPERTENSION: ICD-10-CM

## 2021-01-05 DIAGNOSIS — R41.3 MEMORY CHANGES: ICD-10-CM

## 2021-01-05 DIAGNOSIS — E55.9 VITAMIN D DEFICIENCY: ICD-10-CM

## 2021-01-05 DIAGNOSIS — F41.9 ANXIETY AND DEPRESSION: ICD-10-CM

## 2021-01-05 DIAGNOSIS — E78.2 MIXED HYPERLIPIDEMIA: Primary | ICD-10-CM

## 2021-01-05 PROCEDURE — G8510 SCR DEP NEG, NO PLAN REQD: HCPCS | Performed by: FAMILY MEDICINE

## 2021-01-05 PROCEDURE — G8420 CALC BMI NORM PARAMETERS: HCPCS | Performed by: FAMILY MEDICINE

## 2021-01-05 PROCEDURE — G8536 NO DOC ELDER MAL SCRN: HCPCS | Performed by: FAMILY MEDICINE

## 2021-01-05 PROCEDURE — 80061 LIPID PANEL: CPT

## 2021-01-05 PROCEDURE — G8427 DOCREV CUR MEDS BY ELIG CLIN: HCPCS | Performed by: FAMILY MEDICINE

## 2021-01-05 PROCEDURE — 82306 VITAMIN D 25 HYDROXY: CPT

## 2021-01-05 PROCEDURE — 1090F PRES/ABSN URINE INCON ASSESS: CPT | Performed by: FAMILY MEDICINE

## 2021-01-05 PROCEDURE — 80053 COMPREHEN METABOLIC PANEL: CPT

## 2021-01-05 PROCEDURE — 99214 OFFICE O/P EST MOD 30 MIN: CPT | Performed by: FAMILY MEDICINE

## 2021-01-05 PROCEDURE — G0463 HOSPITAL OUTPT CLINIC VISIT: HCPCS | Performed by: FAMILY MEDICINE

## 2021-01-05 PROCEDURE — G8752 SYS BP LESS 140: HCPCS | Performed by: FAMILY MEDICINE

## 2021-01-05 PROCEDURE — G8754 DIAS BP LESS 90: HCPCS | Performed by: FAMILY MEDICINE

## 2021-01-05 PROCEDURE — 1100F PTFALLS ASSESS-DOCD GE2>/YR: CPT | Performed by: FAMILY MEDICINE

## 2021-01-05 PROCEDURE — G8399 PT W/DXA RESULTS DOCUMENT: HCPCS | Performed by: FAMILY MEDICINE

## 2021-01-05 PROCEDURE — G9899 SCRN MAM PERF RSLTS DOC: HCPCS | Performed by: FAMILY MEDICINE

## 2021-01-05 PROCEDURE — 3017F COLORECTAL CA SCREEN DOC REV: CPT | Performed by: FAMILY MEDICINE

## 2021-01-05 PROCEDURE — 3288F FALL RISK ASSESSMENT DOCD: CPT | Performed by: FAMILY MEDICINE

## 2021-01-05 PROCEDURE — 85027 COMPLETE CBC AUTOMATED: CPT

## 2021-01-05 PROCEDURE — 84443 ASSAY THYROID STIM HORMONE: CPT

## 2021-01-05 NOTE — PROGRESS NOTES
1. Have you been to the ER, urgent care clinic since your last visit? Hospitalized since your last visit? No    2. Have you seen or consulted any other health care providers outside of the [address] [place] [address] since your last visit? Include any pap smears or colon screening.  No    Health Maintenance Due   Topic Date Due    GLAUCOMA SCREENING Q2Y  07/09/2020       Chief Complaint   Patient presents with    Medication Evaluation

## 2021-01-05 NOTE — LETTER
1/5/2021 12:32 PM 
 
Ms. Keegan Zhong [address] Patient Active Problem List  
Diagnosis Code  Hyperlipidemia E78.5  CAD (coronary artery disease) I25.10  Hypertension I10  
 GERD (gastroesophageal reflux disease) K21.9  S/P CABG x 1 Z95.1  Memory changes R41.3  Anxiety and depression F41.9, F32.9 Sincerely, Donya Pereira MD

## 2021-01-05 NOTE — PROGRESS NOTES
Chief Complaint   Patient presents with    Medication Evaluation     Family is present with patient today. They report the patient had an episode last week where she called her family members and reported that she could not walk. When they arrived at her house they found that she had not eaten in some time and felt very weak. They are unsure if she was taking her medications properly. The family made her eat, drink some Ensure, took her to their house. Patient improved in 24 hours and after 48 was back to her normal self. She went back to her home at that time. Patient has had nausea since starting the Wellbutrin, family has noticed a further decrease in appetite since this time, patient has been cutting medication in half. Family denies seeing any improvement in mood. Subjective: (As above and below)     Chief Complaint   Patient presents with    Medication Evaluation     she is a 70y.o. year old female who presents for evaluation. Reviewed PmHx, RxHx, FmHx, SocHx, AllgHx and updated in chart. Review of Systems - negative except as listed above    Objective:     Vitals:    01/05/21 1159   BP: 139/83   Pulse: 65   Resp: 17   Temp: 97.1 °F (36.2 °C)   TempSrc: Temporal   SpO2: 95%   Weight: 136 lb (61.7 kg)   Height: 5' 5\" (1.651 m)     Physical Examination: General appearance - alert, well appearing, and in no distress  Mental status - normal mood, behavior, speech, dress, motor activity, and thought processes  Ears - bilateral TM's and external ear canals normal  Chest - clear to auscultation, no wheezes, rales or rhonchi, symmetric air entry  Heart - normal rate, regular rhythm, normal S1, S2, no murmurs, rubs, clicks or gallops  Musculoskeletal - no joint tenderness, deformity or swelling  Extremities - peripheral pulses normal, no pedal edema, no clubbing or cyanosis    Assessment/ Plan:   1. Memory changes  Family and patient are not interested in medication    2.  Anxiety and depression  -stop Wellbutrin, discussed at length need to increase exercise and daily activity     3. Mixed hyperlipidemia  -check labs  - METABOLIC PANEL, COMPREHENSIVE  - LIPID PANEL    4. Essential hypertension  - CBC W/O DIFF  - TSH 3RD GENERATION    5. Vitamin D deficiency   - VITAMIN D, 25 HYDROXY       I have discussed the diagnosis with the patient and the intended plan as seen in the above orders. The patient has received an after-visit summary and questions were answered concerning future plans.      Medication Side Effects and Warnings were discussed with patient: yes  Patient Labs were reviewed: yes  Patient Past Records were reviewed:  yes    Chu Browne M.D.

## 2021-01-06 DIAGNOSIS — I10 ESSENTIAL HYPERTENSION: ICD-10-CM

## 2021-01-06 LAB
25(OH)D3+25(OH)D2 SERPL-MCNC: 41.8 NG/ML (ref 30–100)
ALBUMIN SERPL-MCNC: 4.6 G/DL (ref 3.7–4.7)
ALBUMIN/GLOB SERPL: 2.2 {RATIO} (ref 1.2–2.2)
ALP SERPL-CCNC: 94 IU/L (ref 39–117)
ALT SERPL-CCNC: 29 IU/L (ref 0–32)
AST SERPL-CCNC: 24 IU/L (ref 0–40)
BILIRUB SERPL-MCNC: 0.2 MG/DL (ref 0–1.2)
BUN SERPL-MCNC: 21 MG/DL (ref 8–27)
BUN/CREAT SERPL: 30 (ref 12–28)
CALCIUM SERPL-MCNC: 9.8 MG/DL (ref 8.7–10.3)
CHLORIDE SERPL-SCNC: 103 MMOL/L (ref 96–106)
CHOLEST SERPL-MCNC: 152 MG/DL (ref 100–199)
CO2 SERPL-SCNC: 24 MMOL/L (ref 20–29)
CREAT SERPL-MCNC: 0.7 MG/DL (ref 0.57–1)
ERYTHROCYTE [DISTWIDTH] IN BLOOD BY AUTOMATED COUNT: 11.8 % (ref 11.7–15.4)
GLOBULIN SER CALC-MCNC: 2.1 G/DL (ref 1.5–4.5)
GLUCOSE SERPL-MCNC: 100 MG/DL (ref 65–99)
HCT VFR BLD AUTO: 37.3 % (ref 34–46.6)
HDLC SERPL-MCNC: 64 MG/DL
HGB BLD-MCNC: 12.2 G/DL (ref 11.1–15.9)
INTERPRETATION, 910389: NORMAL
LDLC SERPL CALC-MCNC: 69 MG/DL (ref 0–99)
MCH RBC QN AUTO: 30.1 PG (ref 26.6–33)
MCHC RBC AUTO-ENTMCNC: 32.7 G/DL (ref 31.5–35.7)
MCV RBC AUTO: 92 FL (ref 79–97)
PLATELET # BLD AUTO: 281 X10E3/UL (ref 150–450)
POTASSIUM SERPL-SCNC: 5.2 MMOL/L (ref 3.5–5.2)
PROT SERPL-MCNC: 6.7 G/DL (ref 6–8.5)
RBC # BLD AUTO: 4.05 X10E6/UL (ref 3.77–5.28)
SODIUM SERPL-SCNC: 139 MMOL/L (ref 134–144)
TRIGL SERPL-MCNC: 109 MG/DL (ref 0–149)
TSH SERPL DL<=0.005 MIU/L-ACNC: 1.36 UIU/ML (ref 0.45–4.5)
VLDLC SERPL CALC-MCNC: 19 MG/DL (ref 5–40)
WBC # BLD AUTO: 8.1 X10E3/UL (ref 3.4–10.8)

## 2021-01-06 RX ORDER — LOSARTAN POTASSIUM 50 MG/1
50 TABLET ORAL DAILY
Qty: 90 TAB | Refills: 3 | Status: SHIPPED | OUTPATIENT
Start: 2021-01-06 | End: 2022-02-23 | Stop reason: SDUPTHER

## 2021-01-06 RX ORDER — METOPROLOL SUCCINATE 50 MG/1
TABLET, EXTENDED RELEASE ORAL
Qty: 90 TAB | Refills: 3 | Status: SHIPPED | OUTPATIENT
Start: 2021-01-06 | End: 2021-01-07 | Stop reason: SDUPTHER

## 2021-01-06 RX ORDER — LISINOPRIL 10 MG/1
TABLET ORAL
Qty: 90 TAB | Refills: 3 | Status: CANCELLED | OUTPATIENT
Start: 2021-01-06

## 2021-01-06 RX ORDER — PRAVASTATIN SODIUM 80 MG/1
80 TABLET ORAL
Qty: 90 TAB | Refills: 3 | Status: SHIPPED | OUTPATIENT
Start: 2021-01-06 | End: 2021-12-28 | Stop reason: SDUPTHER

## 2021-01-06 NOTE — TELEPHONE ENCOUNTER
Andre is requesting a refill on med    Requested Prescriptions     Pending Prescriptions Disp Refills    metoprolol succinate (TOPROL-XL) 50 mg XL tablet 90 Tab 3     Sig: TAKE 1 TABLET BY MOUTH EVERY DAY    lisinopriL (PRINIVIL, ZESTRIL) 10 mg tablet 90 Tab 2     Sig: TAKE 1 TABLET BY MOUTH DAILY

## 2021-01-07 NOTE — TELEPHONE ENCOUNTER
kt pedraza faxed refill request    Also needs lisinopriL (PRINIVIL, ZESTRIL) 10 mg tablet [[id number]]  DISCONTINUED

## 2021-01-08 RX ORDER — METOPROLOL SUCCINATE 50 MG/1
TABLET, EXTENDED RELEASE ORAL
Qty: 90 TAB | Refills: 3 | Status: SHIPPED | OUTPATIENT
Start: 2021-01-08 | End: 2022-02-23 | Stop reason: SDUPTHER

## 2021-04-23 ENCOUNTER — HOSPITAL ENCOUNTER (EMERGENCY)
Age: 76
Discharge: HOME | End: 2021-04-23
Attending: EMERGENCY MEDICINE | Admitting: EMERGENCY MEDICINE
Payer: MEDICARE

## 2021-04-23 ENCOUNTER — TELEPHONE (OUTPATIENT)
Dept: FAMILY MEDICINE CLINIC | Age: 76
End: 2021-04-23

## 2021-04-23 ENCOUNTER — APPOINTMENT (OUTPATIENT)
Dept: GENERAL RADIOLOGY | Age: 76
End: 2021-04-23
Attending: PHYSICIAN ASSISTANT
Payer: MEDICARE

## 2021-04-23 VITALS
DIASTOLIC BLOOD PRESSURE: 57 MMHG | WEIGHT: 139.11 LBS | HEART RATE: 64 BPM | RESPIRATION RATE: 18 BRPM | TEMPERATURE: 97.8 F | OXYGEN SATURATION: 100 % | BODY MASS INDEX: 23.18 KG/M2 | SYSTOLIC BLOOD PRESSURE: 135 MMHG | HEIGHT: 65 IN

## 2021-04-23 DIAGNOSIS — R06.02 SOB (SHORTNESS OF BREATH): Primary | ICD-10-CM

## 2021-04-23 LAB
ALBUMIN SERPL-MCNC: 3.7 G/DL (ref 3.5–5)
ALBUMIN/GLOB SERPL: 1.2 {RATIO} (ref 1.1–2.2)
ALP SERPL-CCNC: 94 U/L (ref 45–117)
ALT SERPL-CCNC: 29 U/L (ref 12–78)
ANION GAP SERPL CALC-SCNC: 3 MMOL/L (ref 5–15)
AST SERPL-CCNC: 20 U/L (ref 15–37)
ATRIAL RATE: 62 BPM
BASOPHILS # BLD: 0.1 K/UL (ref 0–0.1)
BASOPHILS NFR BLD: 1 % (ref 0–1)
BILIRUB SERPL-MCNC: 0.2 MG/DL (ref 0.2–1)
BUN SERPL-MCNC: 15 MG/DL (ref 6–20)
BUN/CREAT SERPL: 17 (ref 12–20)
CALCIUM SERPL-MCNC: 8.9 MG/DL (ref 8.5–10.1)
CALCULATED P AXIS, ECG09: 8 DEGREES
CALCULATED R AXIS, ECG10: 57 DEGREES
CALCULATED T AXIS, ECG11: 57 DEGREES
CHLORIDE SERPL-SCNC: 110 MMOL/L (ref 97–108)
CO2 SERPL-SCNC: 29 MMOL/L (ref 21–32)
CREAT SERPL-MCNC: 0.87 MG/DL (ref 0.55–1.02)
DIAGNOSIS, 93000: NORMAL
DIFFERENTIAL METHOD BLD: NORMAL
EOSINOPHIL # BLD: 0.2 K/UL (ref 0–0.4)
EOSINOPHIL NFR BLD: 2 % (ref 0–7)
ERYTHROCYTE [DISTWIDTH] IN BLOOD BY AUTOMATED COUNT: 13 % (ref 11.5–14.5)
GLOBULIN SER CALC-MCNC: 3.2 G/DL (ref 2–4)
GLUCOSE SERPL-MCNC: 109 MG/DL (ref 65–100)
HCT VFR BLD AUTO: 37.4 % (ref 35–47)
HGB BLD-MCNC: 11.9 G/DL (ref 11.5–16)
IMM GRANULOCYTES # BLD AUTO: 0 K/UL (ref 0–0.04)
IMM GRANULOCYTES NFR BLD AUTO: 0 % (ref 0–0.5)
LYMPHOCYTES # BLD: 1.9 K/UL (ref 0.8–3.5)
LYMPHOCYTES NFR BLD: 27 % (ref 12–49)
MCH RBC QN AUTO: 30.2 PG (ref 26–34)
MCHC RBC AUTO-ENTMCNC: 31.8 G/DL (ref 30–36.5)
MCV RBC AUTO: 94.9 FL (ref 80–99)
MONOCYTES # BLD: 0.5 K/UL (ref 0–1)
MONOCYTES NFR BLD: 7 % (ref 5–13)
NEUTS SEG # BLD: 4.4 K/UL (ref 1.8–8)
NEUTS SEG NFR BLD: 63 % (ref 32–75)
NRBC # BLD: 0 K/UL (ref 0–0.01)
NRBC BLD-RTO: 0 PER 100 WBC
P-R INTERVAL, ECG05: 148 MS
PLATELET # BLD AUTO: 231 K/UL (ref 150–400)
PMV BLD AUTO: 10.7 FL (ref 8.9–12.9)
POTASSIUM SERPL-SCNC: 4.4 MMOL/L (ref 3.5–5.1)
PROT SERPL-MCNC: 6.9 G/DL (ref 6.4–8.2)
Q-T INTERVAL, ECG07: 424 MS
QRS DURATION, ECG06: 90 MS
QTC CALCULATION (BEZET), ECG08: 430 MS
RBC # BLD AUTO: 3.94 M/UL (ref 3.8–5.2)
SODIUM SERPL-SCNC: 142 MMOL/L (ref 136–145)
TROPONIN I SERPL-MCNC: <0.05 NG/ML
VENTRICULAR RATE, ECG03: 62 BPM
WBC # BLD AUTO: 7.1 K/UL (ref 3.6–11)

## 2021-04-23 PROCEDURE — 84484 ASSAY OF TROPONIN QUANT: CPT

## 2021-04-23 PROCEDURE — 93005 ELECTROCARDIOGRAM TRACING: CPT

## 2021-04-23 PROCEDURE — 99284 EMERGENCY DEPT VISIT MOD MDM: CPT

## 2021-04-23 PROCEDURE — 85025 COMPLETE CBC W/AUTO DIFF WBC: CPT

## 2021-04-23 PROCEDURE — 36415 COLL VENOUS BLD VENIPUNCTURE: CPT

## 2021-04-23 PROCEDURE — 71046 X-RAY EXAM CHEST 2 VIEWS: CPT

## 2021-04-23 PROCEDURE — 80053 COMPREHEN METABOLIC PANEL: CPT

## 2021-04-23 RX ORDER — ALBUTEROL SULFATE 90 UG/1
2 AEROSOL, METERED RESPIRATORY (INHALATION)
Qty: 1 INHALER | Refills: 0 | Status: SHIPPED | OUTPATIENT
Start: 2021-04-23

## 2021-04-23 NOTE — DISCHARGE INSTRUCTIONS
It was a pleasure taking care of you in our Emergency Department today. We know that when you come to [facility] you are entrusting us with your health, comfort, and safety. Our physicians and nurses honor that trust, and truly appreciate the opportunity to care for you and your loved ones. We also value your feedback. If you receive a survey about your Emergency Department experience today, please fill it out. We care about our patients' feedback, and we listen to what you have to say. Thank you!

## 2021-04-23 NOTE — ED NOTES
Patient asking about discharge since \"she's been here since 1230. \" Advised patient that were waiting on chest xray results. Denies any SOB or chest pain at this time. MD at bedside now.

## 2021-04-23 NOTE — TELEPHONE ENCOUNTER
Was called to the front lobby, this patient walked in saying she wants to see Dr. Liliana Palacio. I got her vital signs as follows. /70 02 was  98 % she showed no signs of SOB at this time. Pulse 64. Second BP was 163/71. Dr. Liliana Palacio came to check on her and advised her to go too ER as her BP is elevated x 2 readings.  She needs  further evaluation

## 2021-04-23 NOTE — ED PROVIDER NOTES
EMERGENCY DEPARTMENT HISTORY AND PHYSICAL EXAM      Date: 4/23/2021  Patient Name: Adam Sweeney  Patient Age and Sex: 70 y.o. female     History of Presenting Illness     Chief Complaint   Patient presents with    Shortness of Breath     short of breath wednesday and today; on wednesday it lasted 30 minutes. denies pain. History Provided By: Patient    HPI: Adam Sweeney is a 82-year-old female with a history of CAD, CABG, presenting with shortness of breath. Patient states that last Wednesday she had an episode where she was short of breath and it lasted for several minutes and then resolved. Today she had been cleaning the house and sat down and then had shortness of breath again for about 20 minutes. Denies any cough, chest pain, nausea, vomiting, fevers, myalgias. Was just worried about the shortness of breath so came in. Currently not feeling short of breath. There are no other complaints, changes, or physical findings at this time. PCP: Brannon Mancia MD    No current facility-administered medications on file prior to encounter. Current Outpatient Medications on File Prior to Encounter   Medication Sig Dispense Refill    metoprolol succinate (TOPROL-XL) 50 mg XL tablet TAKE 1 TABLET BY MOUTH EVERY DAY 90 Tab 3    losartan (COZAAR) 50 mg tablet Take 1 Tab by mouth daily. 90 Tab 3    pravastatin (PRAVACHOL) 80 mg tablet Take 1 Tab by mouth nightly. 90 Tab 3    vit A/vit C/vit E/zinc/copper (PRESERVISION AREDS PO) Take  by mouth.  aspirin (SINDY CHILDRENS ASPIRIN) 81 mg chewable tablet Take 81 mg by mouth daily.  cholecalciferol, vitamin d3, (VITAMIN D) 1,000 unit tablet Take 1,000 Units by mouth daily.          Past History     Past Medical History:  Past Medical History:   Diagnosis Date    Back pain     CAD (coronary artery disease)     CABG, [place]    Dry eye     Hypercholesterolemia     Hypertension        Past Surgical History:  Past Surgical History: Procedure Laterality Date    HX CORONARY ARTERY BYPASS GRAFT  2002    HX GYN      HX TOTAL VAGINAL HYSTERECTOMY  2014       Family History:  Family History   Problem Relation Age of Onset    Heart Attack Mother     Heart Disease Mother     Heart Disease Father     Heart Disease Sister     Emphysema Sister        Social History:  Social History     Tobacco Use    Smoking status: Never Smoker    Smokeless tobacco: Never Used   Substance Use Topics    Alcohol use: Yes     Comment: occ    Drug use: No       Allergies: Allergies   Allergen Reactions    Amoxicillin Rash    Amoxicillin Rash    Estrogens, Conjugated Other (comments)    Sulfa (Sulfonamide Antibiotics) Nausea Only         Review of Systems   Review of Systems   Constitutional: Negative for chills and fever. Respiratory: Positive for shortness of breath. Negative for cough. Cardiovascular: Negative for chest pain. Gastrointestinal: Negative for abdominal pain, constipation, diarrhea, nausea and vomiting. Genitourinary: Negative for dysuria, frequency and hematuria. Neurological: Negative for weakness and numbness. All other systems reviewed and are negative. Physical Exam   Physical Exam  Vitals signs and nursing note reviewed. Constitutional:       Appearance: She is well-developed. HENT:      Head: Normocephalic and atraumatic. Nose: Nose normal.      Mouth/Throat:      Mouth: Mucous membranes are moist.   Eyes:      Extraocular Movements: Extraocular movements intact. Conjunctiva/sclera: Conjunctivae normal.   Neck:      Musculoskeletal: Normal range of motion and neck supple. Cardiovascular:      Rate and Rhythm: Normal rate and regular rhythm. Pulmonary:      Effort: Pulmonary effort is normal. No respiratory distress. Breath sounds: Normal breath sounds. Abdominal:      General: There is no distension. Palpations: Abdomen is soft. Tenderness: There is no abdominal tenderness. Musculoskeletal: Normal range of motion. Skin:     General: Skin is warm and dry. Neurological:      General: No focal deficit present. Mental Status: She is alert and oriented to person, place, and time. Mental status is at baseline. Psychiatric:         Mood and Affect: Mood normal.          Diagnostic Study Results     Labs -     Recent Results (from the past 12 hour(s))   EKG, 12 LEAD, INITIAL    Collection Time: 04/23/21 12:57 PM   Result Value Ref Range    Ventricular Rate 62 BPM    Atrial Rate 62 BPM    P-R Interval 148 ms    QRS Duration 90 ms    Q-T Interval 424 ms    QTC Calculation (Bezet) 430 ms    Calculated P Axis 8 degrees    Calculated R Axis 57 degrees    Calculated T Axis 57 degrees    Diagnosis       Normal sinus rhythm  RSR' or QR pattern in V1 suggests right ventricular conduction delay  Nonspecific T wave abnormality  When compared with ECG of 19-NOV-2018 18:55,  No significant change was found     CBC WITH AUTOMATED DIFF    Collection Time: 04/23/21  1:47 PM   Result Value Ref Range    WBC 7.1 3.6 - 11.0 K/uL    RBC 3.94 3.80 - 5.20 M/uL    HGB 11.9 11.5 - 16.0 g/dL    HCT 37.4 35.0 - 47.0 %    MCV 94.9 80.0 - 99.0 FL    MCH 30.2 26.0 - 34.0 PG    MCHC 31.8 30.0 - 36.5 g/dL    RDW 13.0 11.5 - 14.5 %    PLATELET 333 074 - 032 K/uL    MPV 10.7 8.9 - 12.9 FL    NRBC 0.0 0  WBC    ABSOLUTE NRBC 0.00 0.00 - 0.01 K/uL    NEUTROPHILS 63 32 - 75 %    LYMPHOCYTES 27 12 - 49 %    MONOCYTES 7 5 - 13 %    EOSINOPHILS 2 0 - 7 %    BASOPHILS 1 0 - 1 %    IMMATURE GRANULOCYTES 0 0.0 - 0.5 %    ABS. NEUTROPHILS 4.4 1.8 - 8.0 K/UL    ABS. LYMPHOCYTES 1.9 0.8 - 3.5 K/UL    ABS. MONOCYTES 0.5 0.0 - 1.0 K/UL    ABS. EOSINOPHILS 0.2 0.0 - 0.4 K/UL    ABS. BASOPHILS 0.1 0.0 - 0.1 K/UL    ABS. IMM.  GRANS. 0.0 0.00 - 0.04 K/UL    DF AUTOMATED     METABOLIC PANEL, COMPREHENSIVE    Collection Time: 04/23/21  1:47 PM   Result Value Ref Range    Sodium 142 136 - 145 mmol/L    Potassium 4.4 3.5 - 5.1 mmol/L    Chloride 110 (H) 97 - 108 mmol/L    CO2 29 21 - 32 mmol/L    Anion gap 3 (L) 5 - 15 mmol/L    Glucose 109 (H) 65 - 100 mg/dL    BUN 15 6 - 20 MG/DL    Creatinine 0.87 0.55 - 1.02 MG/DL    BUN/Creatinine ratio 17 12 - 20      GFR est AA >60 >60 ml/min/1.73m2    GFR est non-AA >60 >60 ml/min/1.73m2    Calcium 8.9 8.5 - 10.1 MG/DL    Bilirubin, total 0.2 0.2 - 1.0 MG/DL    ALT (SGPT) 29 12 - 78 U/L    AST (SGOT) 20 15 - 37 U/L    Alk. phosphatase 94 45 - 117 U/L    Protein, total 6.9 6.4 - 8.2 g/dL    Albumin 3.7 3.5 - 5.0 g/dL    Globulin 3.2 2.0 - 4.0 g/dL    A-G Ratio 1.2 1.1 - 2.2     TROPONIN I    Collection Time: 04/23/21  1:47 PM   Result Value Ref Range    Troponin-I, Qt. <0.05 <0.05 ng/mL       Radiologic Studies -   XR CHEST PA LAT   Final Result   No acute findings. COPD. Status post CABG. CT Results  (Last 48 hours)    None        CXR Results  (Last 48 hours)               04/23/21 1525  XR CHEST PA LAT Final result    Impression:  No acute findings. COPD. Status post CABG. Narrative:  EXAM: XR CHEST PA LAT       INDICATION: SOB       COMPARISON: Chest x-ray 9/2/2020 and CT thorax 11/19/2018. FINDINGS: PA and lateral radiographs of the chest demonstrate hyperinflated but   otherwise clear lungs. There are median sternotomy wires and surgical clips   compatible with prior CABG. The cardiac and mediastinal contours and pulmonary   vascularity are normal. The bones and soft tissues are within normal limits. Medical Decision Making   I am the first provider for this patient. I reviewed the vital signs, available nursing notes, past medical history, past surgical history, family history and social history. Vital Signs-Reviewed the patient's vital signs.   Patient Vitals for the past 12 hrs:   Temp Pulse Resp BP SpO2   04/23/21 1255 97.8 °F (36.6 °C) 64 18 (!) 135/57 100 %       Records Reviewed: Nursing Notes and Old Medical Records    Provider Notes (Medical Decision Making):   Patient presenting with shortness of breath here and very benign physical exam and in no distress. Her EKG and lab work came back normal with no signs of ischemia. I have a low suspicion for PE, pneumonia, pneumothorax, ACS. Could be bronchitis or could be exertional dyspnea. She does have a cardiologist that she can follow-up with for echo and possible stress test.  Her chest x-ray did show signs of COPD but not a smoker no wheezing on exam.  We will try albuterol if she ever has symptoms. ED Course:   Initial assessment performed. The patients presenting problems have been discussed, and they are in agreement with the care plan formulated and outlined with them. I have encouraged them to ask questions as they arise throughout their visit. Critical Care Time:   0    Disposition:  Discharge Note:  The patient has been re-evaluated and is ready for discharge. Reviewed available results with patient. Counseled patient on diagnosis and care plan. Patient has expressed understanding, and all questions have been answered. Patient agrees with plan and agrees to follow up as recommended, or to return to the ED if their symptoms worsen. Discharge instructions have been provided and explained to the patient, along with reasons to return to the ED. PLAN:  Current Discharge Medication List      START taking these medications    Details   albuterol (PROVENTIL HFA, VENTOLIN HFA, PROAIR HFA) 90 mcg/actuation inhaler Take 2 Puffs by inhalation every six (6) hours as needed for Wheezing.   Qty: 1 Inhaler, Refills: 0         1.   2.   Follow-up Information     Follow up With Specialties Details Why Contact Info    Cordell Mancia MD Family Medicine  As needed [address]  [address] [place] [address] [phone]      Gopi Alcaraz MD Cardiology Schedule an appointment as soon as possible for a visit   [address]  [id number]  [facility] [phone].  Return to ED if worse     Diagnosis     Clinical Impression:   1. SOB (shortness of breath)        Attestations:    Akash Noland M.D. Please note that this dictation was completed with IPextreme, the computer voice recognition software. Quite often unanticipated grammatical, syntax, homophones, and other interpretive errors are inadvertently transcribed by the computer software. Please disregard these errors. Please excuse any errors that have escaped final proofreading. Thank you.

## 2021-04-24 ENCOUNTER — IMMUNIZATION (OUTPATIENT)
Dept: FAMILY MEDICINE CLINIC | Age: 76
End: 2021-04-24
Payer: MEDICARE

## 2021-04-24 DIAGNOSIS — Z23 ENCOUNTER FOR IMMUNIZATION: Primary | ICD-10-CM

## 2021-04-24 PROCEDURE — 91300 COVID-19, MRNA, LNP-S, PF, 30MCG/0.3ML DOSE(PFIZER): CPT | Performed by: FAMILY MEDICINE

## 2021-05-15 ENCOUNTER — IMMUNIZATION (OUTPATIENT)
Dept: FAMILY MEDICINE CLINIC | Age: 76
End: 2021-05-15

## 2021-05-15 DIAGNOSIS — Z23 ENCOUNTER FOR IMMUNIZATION: Primary | ICD-10-CM

## 2021-07-09 ENCOUNTER — TELEPHONE (OUTPATIENT)
Dept: FAMILY MEDICINE CLINIC | Age: 76
End: 2021-07-09

## 2021-07-09 DIAGNOSIS — Z12.11 SCREEN FOR COLON CANCER: Primary | ICD-10-CM

## 2021-08-02 ENCOUNTER — TELEPHONE (OUTPATIENT)
Dept: FAMILY MEDICINE CLINIC | Age: 76
End: 2021-08-02

## 2021-08-02 NOTE — TELEPHONE ENCOUNTER
Patient trying to be seen this week, has been having \"pain all over and is getting worse\".  She says that she has had this pain for a few years, but seems like it is increasing the past few days

## 2021-08-12 ENCOUNTER — TELEPHONE (OUTPATIENT)
Dept: FAMILY MEDICINE CLINIC | Age: 76
End: 2021-08-12

## 2021-08-12 NOTE — TELEPHONE ENCOUNTER
----- Message from Chetan Mckenzie sent at 8/12/2021  4:09 PM EDT -----  Regarding: Dr. Hudson Cancel: [phone]  General Message/Vendor Calls    Caller's first and last name: Pt      Reason for call: Pt's dentist is requesting to speak to you regarding the pt's allergies. She has an oral infection, and is needing to be prescribed an antibiotic, but the pt states she is allergic to Amoxicillin. Callback required yes/no and why: Yes      Best contact number(s):[phone] pt      Details to clarify the request: [facility] phone: [phone].       Chetan Mckenzie

## 2021-08-13 ENCOUNTER — TRANSCRIBE ORDER (OUTPATIENT)
Dept: NEUROLOGY | Age: 76
End: 2021-08-13

## 2021-08-19 ENCOUNTER — OFFICE VISIT (OUTPATIENT)
Dept: FAMILY MEDICINE CLINIC | Age: 76
End: 2021-08-19
Payer: MEDICARE

## 2021-08-19 VITALS
TEMPERATURE: 98 F | HEART RATE: 71 BPM | DIASTOLIC BLOOD PRESSURE: 76 MMHG | SYSTOLIC BLOOD PRESSURE: 128 MMHG | WEIGHT: 133.2 LBS | RESPIRATION RATE: 16 BRPM | BODY MASS INDEX: 22.19 KG/M2 | OXYGEN SATURATION: 97 % | HEIGHT: 65 IN

## 2021-08-19 DIAGNOSIS — I10 ESSENTIAL HYPERTENSION: Primary | ICD-10-CM

## 2021-08-19 DIAGNOSIS — R79.9 ABNORMAL FINDING OF BLOOD CHEMISTRY, UNSPECIFIED: ICD-10-CM

## 2021-08-19 DIAGNOSIS — E55.9 VITAMIN D DEFICIENCY: ICD-10-CM

## 2021-08-19 DIAGNOSIS — M79.10 MYALGIA: ICD-10-CM

## 2021-08-19 DIAGNOSIS — E78.2 MIXED HYPERLIPIDEMIA: ICD-10-CM

## 2021-08-19 DIAGNOSIS — Z00.00 MEDICARE ANNUAL WELLNESS VISIT, SUBSEQUENT: ICD-10-CM

## 2021-08-19 DIAGNOSIS — F33.1 MAJOR DEPRESSIVE DISORDER, RECURRENT, MODERATE (HCC): ICD-10-CM

## 2021-08-19 DIAGNOSIS — F03.90 MAJOR NEUROCOGNITIVE DISORDER DUE TO ALZHEIMER'S DISEASE, PROBABLE, WITHOUT BEHAVIORAL DISTURBANCE: ICD-10-CM

## 2021-08-19 PROCEDURE — 1100F PTFALLS ASSESS-DOCD GE2>/YR: CPT | Performed by: FAMILY MEDICINE

## 2021-08-19 PROCEDURE — G8754 DIAS BP LESS 90: HCPCS | Performed by: FAMILY MEDICINE

## 2021-08-19 PROCEDURE — 3017F COLORECTAL CA SCREEN DOC REV: CPT | Performed by: FAMILY MEDICINE

## 2021-08-19 PROCEDURE — G9717 DOC PT DX DEP/BP F/U NT REQ: HCPCS | Performed by: FAMILY MEDICINE

## 2021-08-19 PROCEDURE — 99213 OFFICE O/P EST LOW 20 MIN: CPT | Performed by: FAMILY MEDICINE

## 2021-08-19 PROCEDURE — G8427 DOCREV CUR MEDS BY ELIG CLIN: HCPCS | Performed by: FAMILY MEDICINE

## 2021-08-19 PROCEDURE — G8752 SYS BP LESS 140: HCPCS | Performed by: FAMILY MEDICINE

## 2021-08-19 PROCEDURE — G8420 CALC BMI NORM PARAMETERS: HCPCS | Performed by: FAMILY MEDICINE

## 2021-08-19 PROCEDURE — G8536 NO DOC ELDER MAL SCRN: HCPCS | Performed by: FAMILY MEDICINE

## 2021-08-19 PROCEDURE — G9899 SCRN MAM PERF RSLTS DOC: HCPCS | Performed by: FAMILY MEDICINE

## 2021-08-19 PROCEDURE — G8399 PT W/DXA RESULTS DOCUMENT: HCPCS | Performed by: FAMILY MEDICINE

## 2021-08-19 PROCEDURE — G0463 HOSPITAL OUTPT CLINIC VISIT: HCPCS | Performed by: FAMILY MEDICINE

## 2021-08-19 PROCEDURE — G0439 PPPS, SUBSEQ VISIT: HCPCS | Performed by: FAMILY MEDICINE

## 2021-08-19 PROCEDURE — 1090F PRES/ABSN URINE INCON ASSESS: CPT | Performed by: FAMILY MEDICINE

## 2021-08-19 PROCEDURE — 3288F FALL RISK ASSESSMENT DOCD: CPT | Performed by: FAMILY MEDICINE

## 2021-08-19 NOTE — PROGRESS NOTES
Chief Complaint   Patient presents with   [facility] Annual Wellness Visit     Pt states every morning she wakes up in pain but when she's up and going the pain goes away. Pt reports that pain feels like it is in her muscles. Pt does not want medication. Pt also wanting lab work done. Pt reports that she her mod has been about the same. Subjective: (As above and below)     Chief Complaint   Patient presents with    Annual Wellness Visit     she is a 70y.o. year old female who presents for evaluation. Reviewed PmHx, RxHx, FmHx, SocHx, AllgHx and updated in chart. Review of Systems - negative except as listed above    Objective:     Vitals:    08/19/21 1302   BP: 128/76   Pulse: 71   Resp: 16   Temp: 98 °F (36.7 °C)   TempSrc: Oral   SpO2: 97%   Weight: 133 lb 3.2 oz (60.4 kg)   Height: 5' 5\" (1.651 m)     Physical Examination: General appearance - alert, well appearing, and in no distress  Mental status - normal mood, behavior, speech, dress, motor activity, and thought processes  Ears - bilateral TM's and external ear canals normal  Chest - clear to auscultation, no wheezes, rales or rhonchi, symmetric air entry  Heart - normal rate, regular rhythm, normal S1, S2, no murmurs, rubs, clicks or gallops  Musculoskeletal - no joint tenderness, deformity or swelling    Assessment/ Plan:   1. Major neurocognitive disorder due to Alzheimer's disease, probable, without behavioral disturbance ([facility] [address]  -stable, not on medcation    2. Major depressive disorder, recurrent, moderate (HCC)  Not currently on medication, feels well controlled off of medication    3. Essential hypertension  -well controlled  - CBC W/O DIFF; Future  - TSH 3RD GENERATION; Future    4. Mixed hyperlipidemia  - METABOLIC PANEL, COMPREHENSIVE; Future  - LIPID PANEL; Future  - HEMOGLOBIN A1C WITH EAG; Future    5. Myalgia  -discussed arthritis and early morning stiffness, encouraged daily stretching    6.  Vitamin D deficiency  - VITAMIN D, 25 HYDROXY; Future    7. Abnormal finding of blood chemistry, unspecified   - HEMOGLOBIN A1C WITH EAG; Future       I have discussed the diagnosis with the patient and the intended plan as seen in the above orders. The patient has received an after-visit summary and questions were answered concerning future plans. Medication Side Effects and Warnings were discussed with patient: yes  Patient Labs were reviewed: yes  Patient Past Records were reviewed:  yes    Filemon Tyler M.D. This is the Subsequent Medicare Annual Wellness Exam, performed 12 months or more after the Initial AWV or the last Subsequent AWV    I have reviewed the patient's medical history in detail and updated the computerized patient record. Assessment/Plan   Education and counseling provided:  Are appropriate based on today's review and evaluation    1. Essential hypertension  -     CBC W/O DIFF; Future  -     TSH 3RD GENERATION; Future  2. Major neurocognitive disorder due to Alzheimer's disease, probable, without behavioral disturbance ([facility] [address]  3. Major depressive disorder, recurrent, moderate (HCC)  4. Mixed hyperlipidemia  -     METABOLIC PANEL, COMPREHENSIVE; Future  -     LIPID PANEL; Future  -     HEMOGLOBIN A1C WITH EAG; Future  5. Myalgia  6. Vitamin D deficiency  -     VITAMIN D, 25 HYDROXY; Future  7. Abnormal finding of blood chemistry, unspecified   -     HEMOGLOBIN A1C WITH EAG; Future  8.  Medicare annual wellness visit, subsequent       Depression Risk Factor Screening     3 most recent PHQ Screens 1/5/2021   PHQ Not Done -   Little interest or pleasure in doing things Not at all   Feeling down, depressed, irritable, or hopeless Several days   Total Score PHQ 2 1       Alcohol Risk Screen    Do you average more than 1 drink per night or more than 7 drinks a week:  No    On any one occasion in the past three months have you have had more than 3 drinks containing alcohol:  No        Functional Ability and Level of Safety Hearing: Hearing is good. Activities of Daily Living: The home contains: handrails and grab bars  Patient does total self care      Ambulation: with no difficulty     Fall Risk:  Fall Risk Assessment, last 12 mths 8/19/2021   Able to walk? Yes   Fall in past 12 months? 1   Do you feel unsteady? 0   Are you worried about falling 0   Is TUG test greater than 12 seconds? -   Is the gait abnormal? -   Number of falls in past 12 months 1   Fall with injury? 0      Abuse Screen:  Patient is not abused       Cognitive Screening    Has your family/caregiver stated any concerns about your memory: yes - pt has diagnosed memory impairment       Health Maintenance Due   There are no preventive care reminders to display for this patient. Patient Care Team   Patient Care Team:  Lionel Mckeon MD as PCP - General (Family Medicine)  [facility] Empaneled Provider  Rosemarie Mariee MD (Cardiology)    History     Patient Active Problem List   Diagnosis Code    Hyperlipidemia E78.5    CAD (coronary artery disease) I25.10    Hypertension I10    GERD (gastroesophageal reflux disease) K21.9    S/P CABG x 1 Z95.1    Memory changes R41.3    Anxiety and depression F41.9, F32.9     Past Medical History:   Diagnosis Date    Back pain     CAD (coronary artery disease)     CABG, [place]    Dry eye     Hypercholesterolemia     Hypertension       Past Surgical History:   Procedure Laterality Date    HX CORONARY ARTERY BYPASS GRAFT  2002    HX GYN      HX TOTAL VAGINAL HYSTERECTOMY  2014     Current Outpatient Medications   Medication Sig Dispense Refill    albuterol (PROVENTIL HFA, VENTOLIN HFA, PROAIR HFA) 90 mcg/actuation inhaler Take 2 Puffs by inhalation every six (6) hours as needed for Wheezing. 1 Inhaler 0    metoprolol succinate (TOPROL-XL) 50 mg XL tablet TAKE 1 TABLET BY MOUTH EVERY DAY 90 Tab 3    losartan (COZAAR) 50 mg tablet Take 1 Tab by mouth daily.  90 Tab 3  pravastatin (PRAVACHOL) 80 mg tablet Take 1 Tab by mouth nightly. 90 Tab 3    vit A/vit C/vit E/zinc/copper (PRESERVISION AREDS PO) Take  by mouth.  aspirin (SINDY CHILDRENS ASPIRIN) 81 mg chewable tablet Take 81 mg by mouth daily.  cholecalciferol, vitamin d3, (VITAMIN D) 1,000 unit tablet Take 1,000 Units by mouth daily. Allergies   Allergen Reactions    Amoxicillin Rash    Amoxicillin Rash    Estrogens, Conjugated Other (comments)     Pt states she doesn't remember being allergic to.     Sulfa (Sulfonamide Antibiotics) Nausea Only       Family History   Problem Relation Age of Onset    Heart Attack Mother     Heart Disease Mother     Heart Disease Father     Heart Disease Sister     Emphysema Sister      Social History     Tobacco Use    Smoking status: Never Smoker    Smokeless tobacco: Never Used   Substance Use Topics    Alcohol use: Yes     Comment: chiki Sarabia MD

## 2021-08-19 NOTE — PROGRESS NOTES
1. Have you been to the ER, urgent care clinic since your last visit? Hospitalized since your last visit? Yes, on file. 2. Have you seen or consulted any other health care providers outside of the [address] [place] [address] since your last visit? Include any pap smears or colon screening. No    Health Maintenance Due   Topic Date Due    Medicare Yearly Exam  09/01/2021     Chief Complaint   Patient presents with   [facility] Annual Wellness Visit     Pt states every morning she wakes up in pain but when she's up and going the pain goes away. Pt also wanting lab work done.

## 2021-08-19 NOTE — PATIENT INSTRUCTIONS
Medicare Wellness Visit, Female     The best way to live healthy is to have a lifestyle where you eat a well-balanced diet, exercise regularly, limit alcohol use, and quit all forms of tobacco/nicotine, if applicable. Regular preventive services are another way to keep healthy. Preventive services (vaccines, screening tests, monitoring & exams) can help personalize your care plan, which helps you manage your own care. Screening tests can find health problems at the earliest stages, when they are easiest to treat. [facility] follows the current, evidence-based guidelines published by the [facility] Demian Kumar ([facility]) when recommending preventive services for our patients. Because we follow these guidelines, sometimes recommendations change over time as research supports it. (For example, mammograms used to be recommended annually. Even though Medicare will still pay for an annual mammogram, the newer guidelines recommend a mammogram every two years for women of average risk). Of course, you and your doctor may decide to screen more often for some diseases, based on your risk and your co-morbidities (chronic disease you are already diagnosed with). Preventive services for you include:  - Medicare offers their members a free annual wellness visit, which is time for you and your primary care provider to discuss and plan for your preventive service needs. Take advantage of this benefit every year!  -All adults over the age of 72 should receive the recommended pneumonia vaccines. Current USPSTF guidelines recommend a series of two vaccines for the best pneumonia protection.   -All adults should have a flu vaccine yearly and a tetanus vaccine every 10 years.   -All adults age 48 and older should receive the shingles vaccines (series of two vaccines).       -All adults age 38-68 who are overweight should have a diabetes screening test once every three years.   -All adults born between [birth year] and 1965 should be screened once for Hepatitis C.  -Other screening tests and preventive services for persons with diabetes include: an eye exam to screen for diabetic retinopathy, a kidney function test, a foot exam, and stricter control over your cholesterol.   -Cardiovascular screening for adults with routine risk involves an electrocardiogram (ECG) at intervals determined by your doctor.   -Colorectal cancer screenings should be done for adults age 54-65 with no increased risk factors for colorectal cancer. There are a number of acceptable methods of screening for this type of cancer. Each test has its own benefits and drawbacks. Discuss with your doctor what is most appropriate for you during your annual wellness visit. The different tests include: colonoscopy (considered the best screening method), a fecal occult blood test, a fecal DNA test, and sigmoidoscopy.    -A bone mass density test is recommended when a woman turns 65 to screen for osteoporosis. This test is only recommended one time, as a screening. Some providers will use this same test as a disease monitoring tool if you already have osteoporosis. -Breast cancer screenings are recommended every other year for women of normal risk, age 54-69.  -Cervical cancer screenings for women over age 72 are only recommended with certain risk factors. Here is a list of your current Health Maintenance items (your personalized list of preventive services) with a due date: There are no preventive care reminders to display for this patient.

## 2021-08-20 LAB
25(OH)D3 SERPL-MCNC: 46.7 NG/ML (ref 30–100)
ALBUMIN SERPL-MCNC: 4.1 G/DL (ref 3.5–5)
ALBUMIN/GLOB SERPL: 1.5 {RATIO} (ref 1.1–2.2)
ALP SERPL-CCNC: 97 U/L (ref 45–117)
ALT SERPL-CCNC: 26 U/L (ref 12–78)
ANION GAP SERPL CALC-SCNC: 4 MMOL/L (ref 5–15)
AST SERPL-CCNC: 20 U/L (ref 15–37)
BILIRUB SERPL-MCNC: 0.3 MG/DL (ref 0.2–1)
BUN SERPL-MCNC: 14 MG/DL (ref 6–20)
BUN/CREAT SERPL: 19 (ref 12–20)
CALCIUM SERPL-MCNC: 9.3 MG/DL (ref 8.5–10.1)
CHLORIDE SERPL-SCNC: 110 MMOL/L (ref 97–108)
CHOLEST SERPL-MCNC: 176 MG/DL
CO2 SERPL-SCNC: 29 MMOL/L (ref 21–32)
CREAT SERPL-MCNC: 0.74 MG/DL (ref 0.55–1.02)
ERYTHROCYTE [DISTWIDTH] IN BLOOD BY AUTOMATED COUNT: 12.5 % (ref 11.5–14.5)
EST. AVERAGE GLUCOSE BLD GHB EST-MCNC: 108 MG/DL
GLOBULIN SER CALC-MCNC: 2.7 G/DL (ref 2–4)
GLUCOSE SERPL-MCNC: 94 MG/DL (ref 65–100)
HBA1C MFR BLD: 5.4 % (ref 4–5.6)
HCT VFR BLD AUTO: 39.5 % (ref 35–47)
HDLC SERPL-MCNC: 84 MG/DL
HDLC SERPL: 2.1 {RATIO} (ref 0–5)
HGB BLD-MCNC: 12.2 G/DL (ref 11.5–16)
LDLC SERPL CALC-MCNC: 78.6 MG/DL (ref 0–100)
MCH RBC QN AUTO: 29.9 PG (ref 26–34)
MCHC RBC AUTO-ENTMCNC: 30.9 G/DL (ref 30–36.5)
MCV RBC AUTO: 96.8 FL (ref 80–99)
NRBC # BLD: 0 K/UL (ref 0–0.01)
NRBC BLD-RTO: 0 PER 100 WBC
PLATELET # BLD AUTO: 288 K/UL (ref 150–400)
PMV BLD AUTO: 10.9 FL (ref 8.9–12.9)
POTASSIUM SERPL-SCNC: 4.6 MMOL/L (ref 3.5–5.1)
PROT SERPL-MCNC: 6.8 G/DL (ref 6.4–8.2)
RBC # BLD AUTO: 4.08 M/UL (ref 3.8–5.2)
SODIUM SERPL-SCNC: 143 MMOL/L (ref 136–145)
TRIGL SERPL-MCNC: 67 MG/DL (ref ?–150)
TSH SERPL DL<=0.05 MIU/L-ACNC: 0.98 UIU/ML (ref 0.36–3.74)
VLDLC SERPL CALC-MCNC: 13.4 MG/DL
WBC # BLD AUTO: 6.4 K/UL (ref 3.6–11)

## 2021-09-01 ENCOUNTER — TELEPHONE (OUTPATIENT)
Dept: FAMILY MEDICINE CLINIC | Age: 76
End: 2021-09-01

## 2021-12-29 RX ORDER — PRAVASTATIN SODIUM 80 MG/1
80 TABLET ORAL
Qty: 90 TABLET | Refills: 3 | Status: SHIPPED | OUTPATIENT
Start: 2021-12-29 | End: 2022-04-12 | Stop reason: SDUPTHER

## 2022-02-23 DIAGNOSIS — I10 ESSENTIAL HYPERTENSION: ICD-10-CM

## 2022-02-23 NOTE — TELEPHONE ENCOUNTER
Refill Request (kt sent fax)     Requested Prescriptions     Pending Prescriptions Disp Refills    losartan (COZAAR) 50 mg tablet 90 Tablet 3     Sig: Take 1 Tablet by mouth daily.     metoprolol succinate (TOPROL-XL) 50 mg XL tablet 90 Tablet 3     Sig: TAKE 1 TABLET BY MOUTH EVERY DAY     Thank You

## 2022-02-24 RX ORDER — METOPROLOL SUCCINATE 50 MG/1
TABLET, EXTENDED RELEASE ORAL
Qty: 90 TABLET | Refills: 3 | Status: SHIPPED | OUTPATIENT
Start: 2022-02-24

## 2022-02-24 RX ORDER — LOSARTAN POTASSIUM 50 MG/1
50 TABLET ORAL DAILY
Qty: 90 TABLET | Refills: 3 | Status: SHIPPED | OUTPATIENT
Start: 2022-02-24

## 2022-03-18 PROBLEM — Z95.1 S/P CABG X 1: Status: ACTIVE | Noted: 2017-02-07

## 2022-03-18 PROBLEM — R41.3 MEMORY CHANGES: Status: ACTIVE | Noted: 2021-01-05

## 2022-03-19 PROBLEM — F41.9 ANXIETY AND DEPRESSION: Status: ACTIVE | Noted: 2021-01-05

## 2022-03-19 PROBLEM — F32.A ANXIETY AND DEPRESSION: Status: ACTIVE | Noted: 2021-01-05

## 2022-04-12 ENCOUNTER — OFFICE VISIT (OUTPATIENT)
Dept: FAMILY MEDICINE CLINIC | Age: 77
End: 2022-04-12
Payer: MEDICARE

## 2022-04-12 VITALS
HEART RATE: 78 BPM | HEIGHT: 65 IN | SYSTOLIC BLOOD PRESSURE: 90 MMHG | OXYGEN SATURATION: 96 % | TEMPERATURE: 98.2 F | RESPIRATION RATE: 16 BRPM | BODY MASS INDEX: 22.46 KG/M2 | DIASTOLIC BLOOD PRESSURE: 61 MMHG | WEIGHT: 134.8 LBS

## 2022-04-12 DIAGNOSIS — F03.90 MAJOR NEUROCOGNITIVE DISORDER DUE TO PROBABLE ALZHEIMER'S DISEASE, WITHOUT BEHAVIORAL DISTURBANCE (HCC): ICD-10-CM

## 2022-04-12 DIAGNOSIS — F33.1 MAJOR DEPRESSIVE DISORDER, RECURRENT, MODERATE (HCC): ICD-10-CM

## 2022-04-12 DIAGNOSIS — M67.432 GANGLION OF LEFT WRIST: ICD-10-CM

## 2022-04-12 DIAGNOSIS — Z12.11 SCREENING FOR COLON CANCER: Primary | ICD-10-CM

## 2022-04-12 DIAGNOSIS — E78.2 MIXED HYPERLIPIDEMIA: ICD-10-CM

## 2022-04-12 PROCEDURE — G0463 HOSPITAL OUTPT CLINIC VISIT: HCPCS | Performed by: FAMILY MEDICINE

## 2022-04-12 PROCEDURE — G8399 PT W/DXA RESULTS DOCUMENT: HCPCS | Performed by: FAMILY MEDICINE

## 2022-04-12 PROCEDURE — G9717 DOC PT DX DEP/BP F/U NT REQ: HCPCS | Performed by: FAMILY MEDICINE

## 2022-04-12 PROCEDURE — G8427 DOCREV CUR MEDS BY ELIG CLIN: HCPCS | Performed by: FAMILY MEDICINE

## 2022-04-12 PROCEDURE — G8420 CALC BMI NORM PARAMETERS: HCPCS | Performed by: FAMILY MEDICINE

## 2022-04-12 PROCEDURE — G8752 SYS BP LESS 140: HCPCS | Performed by: FAMILY MEDICINE

## 2022-04-12 PROCEDURE — 1090F PRES/ABSN URINE INCON ASSESS: CPT | Performed by: FAMILY MEDICINE

## 2022-04-12 PROCEDURE — G8754 DIAS BP LESS 90: HCPCS | Performed by: FAMILY MEDICINE

## 2022-04-12 PROCEDURE — 99214 OFFICE O/P EST MOD 30 MIN: CPT | Performed by: FAMILY MEDICINE

## 2022-04-12 PROCEDURE — 3017F COLORECTAL CA SCREEN DOC REV: CPT | Performed by: FAMILY MEDICINE

## 2022-04-12 PROCEDURE — 1101F PT FALLS ASSESS-DOCD LE1/YR: CPT | Performed by: FAMILY MEDICINE

## 2022-04-12 PROCEDURE — G8536 NO DOC ELDER MAL SCRN: HCPCS | Performed by: FAMILY MEDICINE

## 2022-04-12 RX ORDER — PRAVASTATIN SODIUM 80 MG/1
80 TABLET ORAL
Qty: 90 TABLET | Refills: 3 | Status: SHIPPED | OUTPATIENT
Start: 2022-04-12

## 2022-04-12 NOTE — PATIENT INSTRUCTIONS
Ganglions: Care Instructions  Your Care Instructions     A ganglion is a small sac, or cyst, filled with a clear fluid that is like jelly. A ganglion may look like a bump on the hand or wrist. It also can appear on your feet, ankles, knees, or shoulders. It is not cancer. A ganglion can grow out of the protective area, or capsule, around a joint. It also can grow on a tendon sheath, which covers the ropelike tendons that connect muscle to bone. A ganglion may hurt or cause numbness if it presses on a nerve. Many ganglions do not need treatment, and they often go away on their own. But if a ganglion hurts, becomes larger, causes numbness, or limits your activity, your doctor may want to drain it with a needle and syringe or remove it with minor surgery. Follow-up care is a key part of your treatment and safety. Be sure to make and go to all appointments, and call your doctor if you are having problems. It's also a good idea to know your test results and keep a list of the medicines you take. How can you care for yourself at home? · Wear a wrist or finger splint as directed by your doctor. It will keep your wrist or hand from moving and help reduce the fluid in the cyst. This may be all you need for the ganglion to shrink and go away. · Do not smash a ganglion with a book or other heavy object. You may break a bone or otherwise injure your wrist by trying this folk remedy, and the ganglion may return anyway. · Do not try to drain the fluid by poking the ganglion with a pin or any other sharp object. You could cause an infection. When should you call for help? Call your doctor now or seek immediate medical care if:    · You have signs of infection, such as:  ? Increased pain, swelling, warmth, or redness. ? Red streaks leading from the cyst.  ? Pus draining from the cyst.  ? A fever.    Watch closely for changes in your health, and be sure to contact your doctor if:    · You have increasing pain.     · Your ganglion is getting larger.     · You still have pain or numbness from a ganglion. Where can you learn more? Go to http://www.gray.com/  Enter [id number] in the search box to learn more about \"Ganglions: Care Instructions. \"  Current as of: July 1, 2021               Content Version: 13.2  © 2006-2022 [employer]. Care instructions adapted under license by [employer] (which disclaims liability or warranty for this information). If you have questions about a medical condition or this instruction, always ask your healthcare professional. [place] [address] any warranty or liability for your use of this information.

## 2022-04-12 NOTE — PROGRESS NOTES
Chief Complaint   Patient presents with    Hand Swelling     (L) hand swelling      Patient reports that she noted 2 swollen nodules at the base of her left wrist about a week ago, they are nontender. Patient reports that she does not think they have changed in size since they popped up. She was concerned that they were going to get bigger or worse sign of another problem. Patient is due for follow-up colonoscopy, needs an updated referral since her prior provider retired. Subjective: (As above and below)     Chief Complaint   Patient presents with    Hand Swelling     (L) hand swelling      she is a 78y.o. year old female who presents for evaluation. Reviewed PmHx, RxHx, FmHx, SocHx, AllgHx and updated in chart. Review of Systems - negative except as listed above    Objective:     Vitals:    04/12/22 1356   BP: 90/61   Pulse: 78   Resp: 16   Temp: 98.2 °F (36.8 °C)   TempSrc: Oral   SpO2: 96%   Weight: 134 lb 12.8 oz (61.1 kg)   Height: 5' 5\" (1.651 m)     Physical Examination: General appearance - alert, well appearing, and in no distress  Mental status - normal mood, behavior, speech, dress, motor activity, and thought processes  Ears - bilateral TM's and external ear canals normal  Chest - clear to auscultation, no wheezes, rales or rhonchi, symmetric air entry  Heart - normal rate, regular rhythm, normal S1, S2, no murmurs, rubs, clicks or gallops  Musculoskeletal - two small ganglion cysts base of left wrist  Extremities - peripheral pulses normal, no pedal edema, no clubbing or cyanosis    Assessment/ Plan:   1. Major neurocognitive disorder due to probable Alzheimer's disease, without behavioral disturbance ([facility] [address]  Currently stable    2. Major depressive disorder, recurrent, moderate (HCC)  Stable off of medication    3.  Screening for colon cancer  Referred to an alternate provider within the same group patient had previously seen  - REFERRAL TO GASTROENTEROLOGY    4. Mixed hyperlipidemia  - pravastatin (PRAVACHOL) 80 mg tablet; Take 1 Tablet by mouth nightly. Dispense: 90 Tablet; Refill: 3    5. Ganglion of left wrist  Advised patient that we can monitor at this time, no immediate intervention needed, follow-up with orthopedics if they become larger or painful  - REFERRAL TO ORTHOPEDICS       I have discussed the diagnosis with the patient and the intended plan as seen in the above orders. The patient has received an after-visit summary and questions were answered concerning future plans.      Medication Side Effects and Warnings were discussed with patient: yes  Patient Labs were reviewed: yes  Patient Past Records were reviewed:  yes    Jaylene Bello M.D.

## 2022-04-12 NOTE — PROGRESS NOTES
1. Have you been to the ER, urgent care clinic since your last visit? Hospitalized since your last visit? No    2. Have you seen or consulted any other health care providers outside of the [address] [place] [address] since your last visit? Include any pap smears or colon screening.  No    Health Maintenance Due   Topic Date Due    Shingrix Vaccine Age 49> (1 of 2) Never done    COVID-19 Vaccine (3 - Booster for Pfizer series) 10/15/2021    Depression Monitoring  01/05/2022    Colorectal Cancer Screening Combo  03/15/2022     Chief Complaint   Patient presents with    Hand Swelling     (L) hand swelling

## 2023-03-09 NOTE — PROGRESS NOTES
Lab work drawn by lab tech as ordered by Marie Chandler NP · Hyponatremia resolved with IV fluids      Results from last 7 days   Lab Units 03/09/23  0441 03/08/23  0444 03/07/23  0546 03/06/23  1738   SODIUM mmol/L 134* 135 132* 128*

## 2023-09-15 RX ORDER — PRAVASTATIN SODIUM 80 MG/1
TABLET ORAL
Qty: 90 TABLET | Refills: 0 | OUTPATIENT
Start: 2023-09-15

## 2023-11-02 ENCOUNTER — HOSPITAL ENCOUNTER (EMERGENCY)
Facility: HOSPITAL | Age: 78
Discharge: HOME | End: 2023-11-02

## 2023-11-02 VITALS
HEART RATE: 78 BPM | HEIGHT: 65 IN | DIASTOLIC BLOOD PRESSURE: 75 MMHG | RESPIRATION RATE: 18 BRPM | OXYGEN SATURATION: 79 % | TEMPERATURE: 98.1 F | SYSTOLIC BLOOD PRESSURE: 141 MMHG | WEIGHT: 125 LBS | BODY MASS INDEX: 20.83 KG/M2

## 2023-11-02 ASSESSMENT — PAIN SCALES - GENERAL: PAINLEVEL_OUTOF10: 8

## 2023-11-02 ASSESSMENT — PAIN - FUNCTIONAL ASSESSMENT: PAIN_FUNCTIONAL_ASSESSMENT: 0-10

## 2023-11-02 ASSESSMENT — PAIN DESCRIPTION - ORIENTATION: ORIENTATION: LOWER

## 2023-11-02 ASSESSMENT — PAIN DESCRIPTION - LOCATION: LOCATION: BACK

## 2023-11-20 RX ORDER — PRAVASTATIN SODIUM 80 MG/1
TABLET ORAL
Qty: 30 TABLET | Refills: 0 | Status: SHIPPED | OUTPATIENT
Start: 2023-11-20

## 2023-12-19 RX ORDER — PRAVASTATIN SODIUM 80 MG/1
TABLET ORAL
Qty: 30 TABLET | Refills: 0 | OUTPATIENT
Start: 2023-12-19

## 2024-02-04 ENCOUNTER — APPOINTMENT (OUTPATIENT)
Facility: HOSPITAL | Age: 79
DRG: 641 | End: 2024-02-04
Payer: MEDICARE

## 2024-02-04 ENCOUNTER — HOSPITAL ENCOUNTER (EMERGENCY)
Facility: HOSPITAL | Age: 79
Discharge: HOME | DRG: 641 | End: 2024-02-04
Payer: MEDICARE

## 2024-02-04 VITALS
HEART RATE: 62 BPM | RESPIRATION RATE: 18 BRPM | OXYGEN SATURATION: 99 % | TEMPERATURE: 97.3 F | SYSTOLIC BLOOD PRESSURE: 124 MMHG | DIASTOLIC BLOOD PRESSURE: 69 MMHG

## 2024-02-04 DIAGNOSIS — Z86.59 HISTORY OF DEMENTIA: ICD-10-CM
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
## 2024-02-04 PROCEDURE — 70450 CT HEAD/BRAIN W/O DYE: CPT

## 2024-02-04 PROCEDURE — 99284 EMERGENCY DEPT VISIT MOD MDM: CPT

## 2024-02-04 PROCEDURE — 73030 X-RAY EXAM OF SHOULDER: CPT

## 2024-02-04 NOTE — ED PROVIDER NOTES
[facility] EMERGENCY DEPT  EMERGENCY DEPARTMENT ENCOUNTER       Pt Name: Joana Colon  MRN: [id number]  Birthdate 1945  Date of evaluation: 2/4/2024  Provider: JOYCE Webster   PCP: Gavi Meredith MD  Note Started: 6:09 PM EST 2/4/24     CHIEF COMPLAINT       Chief Complaint   Patient presents with    Fall     Pt arrives via EMS, non ambulatory at baseline, pt was in day room at [place] at [facility] and found slid out of wheelchair on floor, anywhere between 5-20 minutes, no injuries or deformities per EMS, at baseline neuro status per staff at facility        HISTORY OF PRESENT ILLNESS: 1 or more elements      History From: Patient, EMS, and Patient's Son  Dementia     Joana Colon is a 78 y.o. female who presents to the ED from [facility] for evaluation for fall.  Per EMS, patient was in the dayroom at [place] [facility] and was found to have slid out of her wheelchair onto the floor.  States she was only down for 15 to 20 minutes.  States she is not ambulatory and in wheelchair baseline.  Denies any known head strike or loss of consciousness.  States she is not complaining of any pain and is otherwise in her usual state of health.       Nursing Notes were all reviewed and agreed with or any disagreements were addressed in the HPI.     REVIEW OF SYSTEMS      Review of Systems   Reason unable to perform ROS: dementia.        Positives and Pertinent negatives as per HPI.    PAST HISTORY     Past Medical History:  Past Medical History:   Diagnosis Date    Back pain     CAD (coronary artery disease)     CABG, [place]    Dry eye     Hypercholesterolemia     Hypertension        Past Surgical History:  Past Surgical History:   Procedure Laterality Date    CORONARY ARTERY BYPASS GRAFT  2002    GYN      HYSTERECTOMY, VAGINAL  2014       Family History:  Family History   Problem Relation Age of Onset    Heart Attack Mother     Heart Disease Mother     Heart Disease Father     Emphysema

## 2024-02-05 ENCOUNTER — APPOINTMENT (OUTPATIENT)
Facility: HOSPITAL | Age: 79
DRG: 641 | End: 2024-02-05
Payer: MEDICARE

## 2024-02-05 ENCOUNTER — HOSPITAL ENCOUNTER (INPATIENT)
Facility: HOSPITAL | Age: 79
LOS: 9 days | Discharge: NURSING, REHAB OR LONG-TERM CARE | DRG: 641 | End: 2024-02-14
Attending: STUDENT IN AN ORGANIZED HEALTH CARE EDUCATION/TRAINING PROGRAM | Admitting: INTERNAL MEDICINE
Payer: MEDICARE

## 2024-02-05 DIAGNOSIS — R29.898 BILATERAL LEG WEAKNESS: ICD-10-CM

## 2024-02-05 DIAGNOSIS — S09.90XA CLOSED HEAD INJURY, INITIAL ENCOUNTER: Primary | ICD-10-CM

## 2024-02-05 PROBLEM — R29.6 FALLS FREQUENTLY: Status: ACTIVE | Noted: 2024-02-05

## 2024-02-05 LAB
ALBUMIN SERPL-MCNC: 2.8 G/DL (ref 3.5–5)
ALBUMIN/GLOB SERPL: 0.8 (ref 1.1–2.2)
ALP SERPL-CCNC: 84 U/L (ref 45–117)
ALT SERPL-CCNC: 26 U/L (ref 12–78)
ANION GAP SERPL CALC-SCNC: 3 MMOL/L (ref 5–15)
APPEARANCE UR: CLEAR
AST SERPL-CCNC: 23 U/L (ref 15–37)
BACTERIA URNS QL MICRO: NEGATIVE /HPF
BASOPHILS # BLD: 0 K/UL (ref 0–0.1)
BASOPHILS NFR BLD: 0 % (ref 0–1)
BILIRUB SERPL-MCNC: 0.2 MG/DL (ref 0.2–1)
BILIRUB UR QL: NEGATIVE
BUN SERPL-MCNC: 24 MG/DL (ref 6–20)
BUN/CREAT SERPL: 29 (ref 12–20)
CALCIUM SERPL-MCNC: 8.6 MG/DL (ref 8.5–10.1)
CHLORIDE SERPL-SCNC: 113 MMOL/L (ref 97–108)
CO2 SERPL-SCNC: 30 MMOL/L (ref 21–32)
COLOR UR: ABNORMAL
CREAT SERPL-MCNC: 0.82 MG/DL (ref 0.55–1.02)
DIFFERENTIAL METHOD BLD: NORMAL
EOSINOPHIL # BLD: 0 K/UL (ref 0–0.4)
EOSINOPHIL NFR BLD: 0 % (ref 0–7)
EPITH CASTS URNS QL MICRO: ABNORMAL /LPF
ERYTHROCYTE [DISTWIDTH] IN BLOOD BY AUTOMATED COUNT: 12.6 % (ref 11.5–14.5)
GLOBULIN SER CALC-MCNC: 3.6 G/DL (ref 2–4)
GLUCOSE SERPL-MCNC: 131 MG/DL (ref 65–100)
GLUCOSE UR STRIP.AUTO-MCNC: NEGATIVE MG/DL
HCT VFR BLD AUTO: 37.1 % (ref 35–47)
HGB BLD-MCNC: 11.9 G/DL (ref 11.5–16)
HGB UR QL STRIP: NEGATIVE
HYALINE CASTS URNS QL MICRO: ABNORMAL /LPF (ref 0–2)
IMM GRANULOCYTES # BLD AUTO: 0 K/UL (ref 0–0.04)
IMM GRANULOCYTES NFR BLD AUTO: 0 % (ref 0–0.5)
KETONES UR QL STRIP.AUTO: NEGATIVE MG/DL
LEUKOCYTE ESTERASE UR QL STRIP.AUTO: NEGATIVE
LYMPHOCYTES # BLD: 1.4 K/UL (ref 0.8–3.5)
LYMPHOCYTES NFR BLD: 25 % (ref 12–49)
MAGNESIUM SERPL-MCNC: 1.9 MG/DL (ref 1.6–2.4)
MCH RBC QN AUTO: 29.8 PG (ref 26–34)
MCHC RBC AUTO-ENTMCNC: 32.1 G/DL (ref 30–36.5)
MCV RBC AUTO: 92.8 FL (ref 80–99)
MONOCYTES # BLD: 0.4 K/UL (ref 0–1)
MONOCYTES NFR BLD: 7 % (ref 5–13)
NEUTS BAND NFR BLD MANUAL: 4 %
NEUTS SEG # BLD: 3.7 K/UL (ref 1.8–8)
NEUTS SEG NFR BLD: 64 % (ref 32–75)
NITRITE UR QL STRIP.AUTO: NEGATIVE
NRBC # BLD: 0 K/UL (ref 0–0.01)
NRBC BLD-RTO: 0 PER 100 WBC
PH UR STRIP: 6 (ref 5–8)
PLATELET # BLD AUTO: 268 K/UL (ref 150–400)
PMV BLD AUTO: 10.2 FL (ref 8.9–12.9)
POTASSIUM SERPL-SCNC: 3.3 MMOL/L (ref 3.5–5.1)
PROT SERPL-MCNC: 6.4 G/DL (ref 6.4–8.2)
PROT UR STRIP-MCNC: ABNORMAL MG/DL
RBC # BLD AUTO: 4 M/UL (ref 3.8–5.2)
RBC #/AREA URNS HPF: ABNORMAL /HPF (ref 0–5)
RBC MORPH BLD: NORMAL
SODIUM SERPL-SCNC: 146 MMOL/L (ref 136–145)
SP GR UR REFRACTOMETRY: 1.02
URINE CULTURE IF INDICATED: ABNORMAL
UROBILINOGEN UR QL STRIP.AUTO: 0.2 EU/DL (ref 0.2–1)
WBC # BLD AUTO: 5.5 K/UL (ref 3.6–11)
WBC MORPH BLD: NORMAL
WBC URNS QL MICRO: ABNORMAL /HPF (ref 0–4)

## 2024-02-05 PROCEDURE — 6360000002 HC RX W HCPCS: Performed by: INTERNAL MEDICINE

## 2024-02-05 PROCEDURE — 6370000000 HC RX 637 (ALT 250 FOR IP): Performed by: INTERNAL MEDICINE

## 2024-02-05 PROCEDURE — 2580000003 HC RX 258: Performed by: STUDENT IN AN ORGANIZED HEALTH CARE EDUCATION/TRAINING PROGRAM

## 2024-02-05 PROCEDURE — 70450 CT HEAD/BRAIN W/O DYE: CPT

## 2024-02-05 PROCEDURE — 80053 COMPREHEN METABOLIC PANEL: CPT

## 2024-02-05 PROCEDURE — 36415 COLL VENOUS BLD VENIPUNCTURE: CPT

## 2024-02-05 PROCEDURE — 83735 ASSAY OF MAGNESIUM: CPT

## 2024-02-05 PROCEDURE — 81001 URINALYSIS AUTO W/SCOPE: CPT

## 2024-02-05 PROCEDURE — 87506 IADNA-DNA/RNA PROBE TQ 6-11: CPT

## 2024-02-05 PROCEDURE — 99285 EMERGENCY DEPT VISIT HI MDM: CPT

## 2024-02-05 PROCEDURE — 85025 COMPLETE CBC W/AUTO DIFF WBC: CPT

## 2024-02-05 PROCEDURE — 1100000003 HC PRIVATE W/ TELEMETRY

## 2024-02-05 RX ORDER — MIRTAZAPINE 15 MG/1
15 TABLET, FILM COATED ORAL NIGHTLY
COMMUNITY

## 2024-02-05 RX ORDER — SODIUM CHLORIDE 9 MG/ML
INJECTION, SOLUTION INTRAVENOUS CONTINUOUS
Status: DISCONTINUED | OUTPATIENT
Start: 2024-02-05 | End: 2024-02-05

## 2024-02-05 RX ORDER — VITAMIN B COMPLEX
1000 TABLET ORAL DAILY
Status: DISCONTINUED | OUTPATIENT
Start: 2024-02-05 | End: 2024-02-14 | Stop reason: HOSPADM

## 2024-02-05 RX ORDER — LOSARTAN POTASSIUM 25 MG/1
50 TABLET ORAL DAILY
Status: DISCONTINUED | OUTPATIENT
Start: 2024-02-06 | End: 2024-02-14 | Stop reason: HOSPADM

## 2024-02-05 RX ORDER — ACETAMINOPHEN 650 MG/1
650 SUPPOSITORY RECTAL EVERY 6 HOURS PRN
Status: DISCONTINUED | OUTPATIENT
Start: 2024-02-05 | End: 2024-02-14 | Stop reason: HOSPADM

## 2024-02-05 RX ORDER — HALOPERIDOL 5 MG/ML
1 INJECTION INTRAMUSCULAR
Status: DISCONTINUED | OUTPATIENT
Start: 2024-02-05 | End: 2024-02-05

## 2024-02-05 RX ORDER — POLYETHYLENE GLYCOL 3350 17 G/17G
17 POWDER, FOR SOLUTION ORAL DAILY PRN
Status: DISCONTINUED | OUTPATIENT
Start: 2024-02-05 | End: 2024-02-09

## 2024-02-05 RX ORDER — LORAZEPAM 0.5 MG/1
0.5 TABLET ORAL 2 TIMES DAILY
COMMUNITY

## 2024-02-05 RX ORDER — METOPROLOL SUCCINATE 50 MG/1
50 TABLET, EXTENDED RELEASE ORAL DAILY
Status: DISCONTINUED | OUTPATIENT
Start: 2024-02-06 | End: 2024-02-14 | Stop reason: HOSPADM

## 2024-02-05 RX ORDER — BUSPIRONE HYDROCHLORIDE 15 MG/1
15 TABLET ORAL 2 TIMES DAILY
COMMUNITY

## 2024-02-05 RX ORDER — SODIUM CHLORIDE AND POTASSIUM CHLORIDE 150; 450 MG/100ML; MG/100ML
INJECTION, SOLUTION INTRAVENOUS CONTINUOUS
Status: DISCONTINUED | OUTPATIENT
Start: 2024-02-05 | End: 2024-02-07

## 2024-02-05 RX ORDER — LORAZEPAM 0.5 MG/1
0.5 TABLET ORAL 2 TIMES DAILY PRN
Status: DISCONTINUED | OUTPATIENT
Start: 2024-02-05 | End: 2024-02-14 | Stop reason: HOSPADM

## 2024-02-05 RX ORDER — ASPIRIN 81 MG/1
81 TABLET, CHEWABLE ORAL DAILY
Status: DISCONTINUED | OUTPATIENT
Start: 2024-02-06 | End: 2024-02-14 | Stop reason: HOSPADM

## 2024-02-05 RX ORDER — HALOPERIDOL 5 MG/ML
1 INJECTION INTRAMUSCULAR EVERY 6 HOURS PRN
Status: DISCONTINUED | OUTPATIENT
Start: 2024-02-05 | End: 2024-02-14 | Stop reason: HOSPADM

## 2024-02-05 RX ORDER — ONDANSETRON 4 MG/1
4 TABLET, ORALLY DISINTEGRATING ORAL EVERY 8 HOURS PRN
Status: DISCONTINUED | OUTPATIENT
Start: 2024-02-05 | End: 2024-02-14 | Stop reason: HOSPADM

## 2024-02-05 RX ORDER — SODIUM CHLORIDE, SODIUM LACTATE, POTASSIUM CHLORIDE, AND CALCIUM CHLORIDE .6; .31; .03; .02 G/100ML; G/100ML; G/100ML; G/100ML
500 INJECTION, SOLUTION INTRAVENOUS ONCE
Status: COMPLETED | OUTPATIENT
Start: 2024-02-05 | End: 2024-02-05

## 2024-02-05 RX ORDER — PRAVASTATIN SODIUM 40 MG
80 TABLET ORAL NIGHTLY
Status: DISCONTINUED | OUTPATIENT
Start: 2024-02-05 | End: 2024-02-14 | Stop reason: HOSPADM

## 2024-02-05 RX ORDER — ACETAMINOPHEN 325 MG/1
650 TABLET ORAL EVERY 6 HOURS PRN
Status: DISCONTINUED | OUTPATIENT
Start: 2024-02-05 | End: 2024-02-14 | Stop reason: HOSPADM

## 2024-02-05 RX ORDER — ONDANSETRON 2 MG/ML
4 INJECTION INTRAMUSCULAR; INTRAVENOUS EVERY 6 HOURS PRN
Status: DISCONTINUED | OUTPATIENT
Start: 2024-02-05 | End: 2024-02-14 | Stop reason: HOSPADM

## 2024-02-05 RX ORDER — MIRTAZAPINE 15 MG/1
15 TABLET, FILM COATED ORAL NIGHTLY
Status: DISCONTINUED | OUTPATIENT
Start: 2024-02-05 | End: 2024-02-14 | Stop reason: HOSPADM

## 2024-02-05 RX ORDER — LORAZEPAM 0.5 MG/1
0.5 TABLET ORAL 2 TIMES DAILY
Status: DISCONTINUED | OUTPATIENT
Start: 2024-02-05 | End: 2024-02-05

## 2024-02-05 RX ORDER — ALBUTEROL SULFATE 2.5 MG/3ML
2.5 SOLUTION RESPIRATORY (INHALATION) EVERY 6 HOURS PRN
Status: DISCONTINUED | OUTPATIENT
Start: 2024-02-05 | End: 2024-02-14 | Stop reason: HOSPADM

## 2024-02-05 RX ORDER — ENOXAPARIN SODIUM 100 MG/ML
40 INJECTION SUBCUTANEOUS DAILY
Status: DISCONTINUED | OUTPATIENT
Start: 2024-02-05 | End: 2024-02-14 | Stop reason: HOSPADM

## 2024-02-05 RX ADMIN — HALOPERIDOL LACTATE 1 MG: 5 INJECTION, SOLUTION INTRAMUSCULAR at 18:17

## 2024-02-05 RX ADMIN — POTASSIUM CHLORIDE AND SODIUM CHLORIDE: 450; 150 INJECTION, SOLUTION INTRAVENOUS at 18:21

## 2024-02-05 RX ADMIN — ACETAMINOPHEN 650 MG: 325 TABLET ORAL at 20:34

## 2024-02-05 RX ADMIN — LORAZEPAM 0.5 MG: 0.5 TABLET ORAL at 20:34

## 2024-02-05 RX ADMIN — SODIUM CHLORIDE, POTASSIUM CHLORIDE, SODIUM LACTATE AND CALCIUM CHLORIDE 500 ML: 600; 310; 30; 20 INJECTION, SOLUTION INTRAVENOUS at 16:36

## 2024-02-05 RX ADMIN — PRAVASTATIN SODIUM 80 MG: 40 TABLET ORAL at 20:34

## 2024-02-05 RX ADMIN — BUSPIRONE HYDROCHLORIDE 15 MG: 10 TABLET ORAL at 20:33

## 2024-02-05 RX ADMIN — MIRTAZAPINE 15 MG: 15 TABLET, FILM COATED ORAL at 20:34

## 2024-02-05 NOTE — ED NOTES
Verbal shift change report given to Sofía RN (oncoming nurse) by SHANNON Wu (offgoing nurse). Report included the following information Recent Results.      2002- H2H ETA 2030.

## 2024-02-05 NOTE — ED NOTES
Pt keeps having loose-mucus like bowel movements. Pt has had approx. 5 since she has been in ED. Informed [place] SHELTON that there is a concern for C-Diff.

## 2024-02-05 NOTE — ED NOTES
Verbal shift change report given to SHANNON Gore (oncoming nurse) by SHANNON Wu (offgoing nurse). Report included the following information Nurse Handoff Report, ED Encounter Summary, ED SBAR, Recent Results, and Neuro Assessment.

## 2024-02-05 NOTE — ED PROVIDER NOTES
[facility] EMERGENCY DEPT  EMERGENCY DEPARTMENT ENCOUNTER       Pt Name: Joana Colon  MRN: [id number]  Birthdate 1945  Date of evaluation: 2/5/2024  Provider: René Mcbride MD   PCP: Gavi Meredith MD  Note Started: 2:48 PM EST 2/5/24     CHIEF COMPLAINT       Chief Complaint   Patient presents with    Fall     Pt BIB EMS coming from [facility] memory care. Pt had a witnessed GLF by staff. Pt did hit her head no LOC. Pt is c/o HA. It took 3 people to get pt to stand to get into ER bed. Pt has hx of dementia and is currently A&Ox2. Pt family and staff \"are concerned pt is not at baseline\". EMS could not elaborate on what that statement meant.     Head Injury        HISTORY OF PRESENT ILLNESS: 1 or more elements      History From: ems, patient, History limited by: dementia     Joana Colon is a 78 y.o. female presenting with head trauma after a fall.  Notes she is in a memory care facility and was trying to push something and fell over and struck her head.  She does note pain to the right side of her head.  Denies any chest pain or back pain or arm pain or leg pain.  She notes some chronic abdominal pain that is not new.  Denies LOC.       Please See MDM for Additional Details of the HPI/PMH  Nursing Notes were all reviewed and agreed with or any disagreements were addressed in the HPI.     REVIEW OF SYSTEMS        Positives and Pertinent negatives as per HPI.    PAST HISTORY     Past Medical History:  Past Medical History:   Diagnosis Date    Back pain     CAD (coronary artery disease)     CABG, [place]    Dry eye     Hypercholesterolemia     Hypertension        Past Surgical History:  Past Surgical History:   Procedure Laterality Date    CORONARY ARTERY BYPASS GRAFT  2002    GYN      HYSTERECTOMY, VAGINAL  2014       Family History:  Family History   Problem Relation Age of Onset    Heart Attack Mother     Heart Disease Mother     Heart Disease Father     Emphysema Sister     Heart Disease Sister

## 2024-02-05 NOTE — DISCHARGE INSTRUCTIONS
Thank You!    It was a pleasure taking care of you in our Emergency Department today. We know that when you come to [facility], you are entrusting us with your health, comfort, and safety. Our clinicians honor that trust, and truly appreciate the opportunity to care for you and your loved ones.    If you receive a survey about your Emergency Department experience today, please fill it out.  We value your feedback. Thank you.      Nu Richardson PA-C    ___________________________________  I have included a copy of your lab results and/or radiologic studies from today's visit so you can have them easily available at your follow-up visit.   No results found for this or any previous visit (from the past 12 hour(s)).    CT HEAD WO CONTRAST   Final Result   1. No evidence of acute infarct or intracranial hemorrhage.   2. Periventricular white matter disease is likely secondary to chronic small   vessel ischemic changes.   3. Generalized ventriculomegaly.          XR SHOULDER RIGHT (MIN 2 VIEWS)   Final Result   No acute abnormality.        [unfilled]

## 2024-02-06 PROBLEM — G30.9 ALZHEIMER'S DEMENTIA WITHOUT BEHAVIORAL DISTURBANCE, PSYCHOTIC DISTURBANCE, MOOD DISTURBANCE, OR ANXIETY (HCC): Status: ACTIVE | Noted: 2024-02-06

## 2024-02-06 PROBLEM — F02.80 ALZHEIMER'S DEMENTIA WITHOUT BEHAVIORAL DISTURBANCE, PSYCHOTIC DISTURBANCE, MOOD DISTURBANCE, OR ANXIETY (HCC): Status: ACTIVE | Noted: 2024-02-06

## 2024-02-06 LAB
ANION GAP SERPL CALC-SCNC: 3 MMOL/L (ref 5–15)
BASOPHILS # BLD: 0.1 K/UL (ref 0–0.1)
BASOPHILS NFR BLD: 1 % (ref 0–1)
BUN SERPL-MCNC: 15 MG/DL (ref 6–20)
BUN/CREAT SERPL: 26 (ref 12–20)
C COLI+JEJUNI TUF STL QL NAA+PROBE: NEGATIVE
C DIFF GDH STL QL: NEGATIVE
C DIFF TOX A+B STL QL IA: NEGATIVE
C DIFF TOXIN INTERPRETATION: NORMAL
CALCIUM SERPL-MCNC: 8.1 MG/DL (ref 8.5–10.1)
CHLORIDE SERPL-SCNC: 115 MMOL/L (ref 97–108)
CO2 SERPL-SCNC: 26 MMOL/L (ref 21–32)
CREAT SERPL-MCNC: 0.58 MG/DL (ref 0.55–1.02)
DIFFERENTIAL METHOD BLD: ABNORMAL
EC STX1+STX2 GENES STL QL NAA+PROBE: NEGATIVE
EOSINOPHIL # BLD: 0 K/UL (ref 0–0.4)
EOSINOPHIL NFR BLD: 0 % (ref 0–7)
ERYTHROCYTE [DISTWIDTH] IN BLOOD BY AUTOMATED COUNT: 12.7 % (ref 11.5–14.5)
ETEC ELTA+ESTB GENES STL QL NAA+PROBE: NEGATIVE
GLUCOSE BLD STRIP.AUTO-MCNC: 85 MG/DL (ref 65–117)
GLUCOSE SERPL-MCNC: 86 MG/DL (ref 65–100)
HCT VFR BLD AUTO: 34.2 % (ref 35–47)
HGB BLD-MCNC: 11 G/DL (ref 11.5–16)
IMM GRANULOCYTES # BLD AUTO: 0 K/UL (ref 0–0.04)
IMM GRANULOCYTES NFR BLD AUTO: 0 % (ref 0–0.5)
LYMPHOCYTES # BLD: 1.8 K/UL (ref 0.8–3.5)
LYMPHOCYTES NFR BLD: 35 % (ref 12–49)
MAGNESIUM SERPL-MCNC: 1.8 MG/DL (ref 1.6–2.4)
MCH RBC QN AUTO: 29.6 PG (ref 26–34)
MCHC RBC AUTO-ENTMCNC: 32.2 G/DL (ref 30–36.5)
MCV RBC AUTO: 92.2 FL (ref 80–99)
MONOCYTES # BLD: 0.6 K/UL (ref 0–1)
MONOCYTES NFR BLD: 12 % (ref 5–13)
NEUTS SEG # BLD: 2.6 K/UL (ref 1.8–8)
NEUTS SEG NFR BLD: 52 % (ref 32–75)
NRBC # BLD: 0 K/UL (ref 0–0.01)
NRBC BLD-RTO: 0 PER 100 WBC
P SHIGELLOIDES DNA STL QL NAA+PROBE: NEGATIVE
PLATELET # BLD AUTO: 241 K/UL (ref 150–400)
PMV BLD AUTO: 10.1 FL (ref 8.9–12.9)
POTASSIUM SERPL-SCNC: 3.4 MMOL/L (ref 3.5–5.1)
RBC # BLD AUTO: 3.71 M/UL (ref 3.8–5.2)
RBC MORPH BLD: ABNORMAL
SALMONELLA SP SPAO STL QL NAA+PROBE: NEGATIVE
SERVICE CMNT-IMP: NORMAL
SHIGELLA SP+EIEC IPAH STL QL NAA+PROBE: NEGATIVE
SODIUM SERPL-SCNC: 144 MMOL/L (ref 136–145)
V CHOL+PARA+VUL DNA STL QL NAA+NON-PROBE: NEGATIVE
WBC # BLD AUTO: 5.1 K/UL (ref 3.6–11)
Y ENTEROCOL DNA STL QL NAA+NON-PROBE: NEGATIVE

## 2024-02-06 PROCEDURE — 80048 BASIC METABOLIC PNL TOTAL CA: CPT

## 2024-02-06 PROCEDURE — 97535 SELF CARE MNGMENT TRAINING: CPT

## 2024-02-06 PROCEDURE — 97165 OT EVAL LOW COMPLEX 30 MIN: CPT

## 2024-02-06 PROCEDURE — 36415 COLL VENOUS BLD VENIPUNCTURE: CPT

## 2024-02-06 PROCEDURE — 97116 GAIT TRAINING THERAPY: CPT

## 2024-02-06 PROCEDURE — 83735 ASSAY OF MAGNESIUM: CPT

## 2024-02-06 PROCEDURE — 92610 EVALUATE SWALLOWING FUNCTION: CPT

## 2024-02-06 PROCEDURE — 6370000000 HC RX 637 (ALT 250 FOR IP): Performed by: INTERNAL MEDICINE

## 2024-02-06 PROCEDURE — 1100000000 HC RM PRIVATE

## 2024-02-06 PROCEDURE — 87449 NOS EACH ORGANISM AG IA: CPT

## 2024-02-06 PROCEDURE — 6360000002 HC RX W HCPCS: Performed by: INTERNAL MEDICINE

## 2024-02-06 PROCEDURE — 82962 GLUCOSE BLOOD TEST: CPT

## 2024-02-06 PROCEDURE — 99223 1ST HOSP IP/OBS HIGH 75: CPT | Performed by: PHYSICAL MEDICINE & REHABILITATION

## 2024-02-06 PROCEDURE — 87324 CLOSTRIDIUM AG IA: CPT

## 2024-02-06 PROCEDURE — 99221 1ST HOSP IP/OBS SF/LOW 40: CPT | Performed by: PHYSICAL MEDICINE & REHABILITATION

## 2024-02-06 PROCEDURE — 85025 COMPLETE CBC W/AUTO DIFF WBC: CPT

## 2024-02-06 PROCEDURE — 97161 PT EVAL LOW COMPLEX 20 MIN: CPT

## 2024-02-06 RX ADMIN — POTASSIUM CHLORIDE AND SODIUM CHLORIDE: 450; 150 INJECTION, SOLUTION INTRAVENOUS at 22:09

## 2024-02-06 RX ADMIN — BUSPIRONE HYDROCHLORIDE 15 MG: 10 TABLET ORAL at 08:06

## 2024-02-06 RX ADMIN — ENOXAPARIN SODIUM 40 MG: 100 INJECTION SUBCUTANEOUS at 08:06

## 2024-02-06 RX ADMIN — Medication 1000 UNITS: at 08:06

## 2024-02-06 RX ADMIN — HALOPERIDOL LACTATE 1 MG: 5 INJECTION, SOLUTION INTRAMUSCULAR at 21:03

## 2024-02-06 RX ADMIN — MIRTAZAPINE 15 MG: 15 TABLET, FILM COATED ORAL at 21:02

## 2024-02-06 RX ADMIN — LORAZEPAM 0.5 MG: 0.5 TABLET ORAL at 14:40

## 2024-02-06 RX ADMIN — POTASSIUM CHLORIDE AND SODIUM CHLORIDE: 450; 150 INJECTION, SOLUTION INTRAVENOUS at 23:37

## 2024-02-06 RX ADMIN — BUSPIRONE HYDROCHLORIDE 15 MG: 10 TABLET ORAL at 21:02

## 2024-02-06 RX ADMIN — ASPIRIN 81 MG: 81 TABLET, CHEWABLE ORAL at 08:06

## 2024-02-06 RX ADMIN — POTASSIUM CHLORIDE AND SODIUM CHLORIDE: 450; 150 INJECTION, SOLUTION INTRAVENOUS at 08:08

## 2024-02-06 RX ADMIN — PRAVASTATIN SODIUM 80 MG: 40 TABLET ORAL at 21:02

## 2024-02-06 NOTE — ACP (ADVANCE CARE PLANNING)
Advance Care Planning      Daughter in law Ellen is mPOA- she will bring in copy of advance medical directive. Pt's 2 sons Js and Cooper, as well as both dtrs in law are very involved and communicate well.     Goals of care: Recovery from acute condition.   Code status: Full code, but family to talk more- recognizing that pt would be in a much worse state if required resuscitation.           Primary Decision Maker: Ellen Wahl - Child - [phone]

## 2024-02-06 NOTE — H&P
Hospitalist Admission Note    NAME:   Joana Colon   : 1945   MRN: [id number]     Date/Time: 2024 7:19 PM    Patient PCP: Gavi Meredith MD    ______________________________________________________________________  Given the patient's current clinical presentation, I have a high level of concern for decompensation if discharged from the emergency department.  Complex decision making was performed, which includes reviewing the patient's available past medical records, laboratory results, and x-ray films.       My assessment of this patient's clinical condition and my plan of care is as follows.    Assessment / Plan:    Ground-level fall  Ambulatory dysfunction  History of diarrhea concerning for C. difficile infection  Hypokalemia  Mild hypernatremia    Admit to telemetry , start IV fluid with half-normal saline with potassium, stool cultures and stain and C. difficile testing  Patient lives in the memory care unit  Repeat BMP    Severe dementia with behavioral disturbances  Will resume home meds including Remeron 15 mg at bedtime, BuSpar  As needed Haldol    Hypertension  BP soft, hold Cozaar metoprolol    Hyperlipidemia  Resume pravastatin    Medical Decision Making:   I personally reviewed labs: CBC BMP  I personally reviewed imaging: CT head, agree with radiology reading  I personally reviewed EKG: Yes, no acute ST changes  Toxic drug monitoring: CBC while on Lovenox  Discussed case with: ED provider. After discussion I am in agreement that acuity of patient's medical condition necessitates hospital stay.      Code Status: Full code per nursing home papers   DVT Prophylaxis: Lovenox  Baseline: Lives in a memory care unit, wheelchair-bound    Subjective:   CHIEF COMPLAINT: Ground-level fall    HISTORY OF PRESENT ILLNESS:     Joana Colon is a 78 y.o.  female with PMHx significant for severe dementia, CAD status post CABG, hypertension who presented to the ED after ground-level fall at the

## 2024-02-07 LAB
ANION GAP SERPL CALC-SCNC: 4 MMOL/L (ref 5–15)
BASOPHILS # BLD: 0 K/UL (ref 0–0.1)
BASOPHILS NFR BLD: 0 % (ref 0–1)
BUN SERPL-MCNC: 11 MG/DL (ref 6–20)
BUN/CREAT SERPL: 19 (ref 12–20)
CALCIUM SERPL-MCNC: 8.7 MG/DL (ref 8.5–10.1)
CHLORIDE SERPL-SCNC: 114 MMOL/L (ref 97–108)
CO2 SERPL-SCNC: 26 MMOL/L (ref 21–32)
CREAT SERPL-MCNC: 0.58 MG/DL (ref 0.55–1.02)
DIFFERENTIAL METHOD BLD: NORMAL
EOSINOPHIL # BLD: 0.1 K/UL (ref 0–0.4)
EOSINOPHIL NFR BLD: 1 % (ref 0–7)
ERYTHROCYTE [DISTWIDTH] IN BLOOD BY AUTOMATED COUNT: 12.3 % (ref 11.5–14.5)
GLUCOSE SERPL-MCNC: 91 MG/DL (ref 65–100)
HCT VFR BLD AUTO: 37.3 % (ref 35–47)
HGB BLD-MCNC: 12 G/DL (ref 11.5–16)
IMM GRANULOCYTES # BLD AUTO: 0 K/UL (ref 0–0.04)
IMM GRANULOCYTES NFR BLD AUTO: 0 % (ref 0–0.5)
LYMPHOCYTES # BLD: 2.1 K/UL (ref 0.8–3.5)
LYMPHOCYTES NFR BLD: 29 % (ref 12–49)
MCH RBC QN AUTO: 29.3 PG (ref 26–34)
MCHC RBC AUTO-ENTMCNC: 32.2 G/DL (ref 30–36.5)
MCV RBC AUTO: 91.2 FL (ref 80–99)
MONOCYTES # BLD: 0.6 K/UL (ref 0–1)
MONOCYTES NFR BLD: 8 % (ref 5–13)
NEUTS SEG # BLD: 4.4 K/UL (ref 1.8–8)
NEUTS SEG NFR BLD: 62 % (ref 32–75)
NRBC # BLD: 0 K/UL (ref 0–0.01)
NRBC BLD-RTO: 0 PER 100 WBC
PLATELET # BLD AUTO: 281 K/UL (ref 150–400)
PMV BLD AUTO: 9.8 FL (ref 8.9–12.9)
POTASSIUM SERPL-SCNC: 3.9 MMOL/L (ref 3.5–5.1)
RBC # BLD AUTO: 4.09 M/UL (ref 3.8–5.2)
SODIUM SERPL-SCNC: 144 MMOL/L (ref 136–145)
WBC # BLD AUTO: 7.2 K/UL (ref 3.6–11)

## 2024-02-07 PROCEDURE — 51701 INSERT BLADDER CATHETER: CPT

## 2024-02-07 PROCEDURE — 51798 US URINE CAPACITY MEASURE: CPT

## 2024-02-07 PROCEDURE — 36415 COLL VENOUS BLD VENIPUNCTURE: CPT

## 2024-02-07 PROCEDURE — 97530 THERAPEUTIC ACTIVITIES: CPT

## 2024-02-07 PROCEDURE — 6360000002 HC RX W HCPCS: Performed by: INTERNAL MEDICINE

## 2024-02-07 PROCEDURE — 80048 BASIC METABOLIC PNL TOTAL CA: CPT

## 2024-02-07 PROCEDURE — 6370000000 HC RX 637 (ALT 250 FOR IP): Performed by: INTERNAL MEDICINE

## 2024-02-07 PROCEDURE — 1100000000 HC RM PRIVATE

## 2024-02-07 PROCEDURE — 85025 COMPLETE CBC W/AUTO DIFF WBC: CPT

## 2024-02-07 PROCEDURE — 92526 ORAL FUNCTION THERAPY: CPT

## 2024-02-07 RX ORDER — CASTOR OIL AND BALSAM, PERU 788; 87 MG/G; MG/G
OINTMENT TOPICAL 2 TIMES DAILY
Status: DISCONTINUED | OUTPATIENT
Start: 2024-02-07 | End: 2024-02-14 | Stop reason: HOSPADM

## 2024-02-07 RX ADMIN — Medication: at 21:09

## 2024-02-07 RX ADMIN — Medication: at 09:53

## 2024-02-07 RX ADMIN — Medication 1000 UNITS: at 12:26

## 2024-02-07 RX ADMIN — ASPIRIN 81 MG: 81 TABLET, CHEWABLE ORAL at 12:25

## 2024-02-07 RX ADMIN — LORAZEPAM 0.5 MG: 0.5 TABLET ORAL at 21:09

## 2024-02-07 RX ADMIN — MIRTAZAPINE 15 MG: 15 TABLET, FILM COATED ORAL at 21:09

## 2024-02-07 RX ADMIN — PRAVASTATIN SODIUM 80 MG: 40 TABLET ORAL at 21:09

## 2024-02-07 RX ADMIN — BUSPIRONE HYDROCHLORIDE 15 MG: 10 TABLET ORAL at 12:26

## 2024-02-07 RX ADMIN — BUSPIRONE HYDROCHLORIDE 15 MG: 10 TABLET ORAL at 21:08

## 2024-02-07 RX ADMIN — ENOXAPARIN SODIUM 40 MG: 100 INJECTION SUBCUTANEOUS at 10:57

## 2024-02-07 NOTE — CARE COORDINATION
Care Management Initial Assessment       RUR: 11% (low RUR)  Readmission? No  1st IM letter given? Yes - Pt access  1st  letter given: No    CM noted pt confused, CM contacted pt's DTR/mPOA. CM introduced self and role and completed initial assessment. CM verified demographic and clinical information.     CM noted multiple insurances showing in chart. Pt's DTR reports pt has Medicare A&B and BCBS Supplement.     Pt recently moved into memory care unit at [facility]. LUAN. Family reports that facility has not LUAN, pt uses elevator. DTR denied O2/CPAP, but did report rollator and wheel chair. DTR reports [facility] staff assist pt with ADLs/iADLs. Patient is not an active . DTR denies history of HH/IPR/SNF.     CM discussed therapy rec for SNF, DTR is agreeable. DTR requested SNF list to be sent to email [email or web address]. CM requested 4-5 choices. Pt will likely need BLS transport.        02/07/24 1336   Service Assessment   Patient Orientation Other (see comment)  (Pt A/Ox2, this is baseline)   Cognition Dementia / Early Alzheimer's   History Provided By Child/Family   Primary Caregiver Other (Comment)  ([facility] staff)   Support Systems None   Patient's Healthcare Decision Maker is: Legal Next of Kin  (DTR has POA docs, CM requested to bring them in)   PCP Verified by CM Yes   Last Visit to PCP Within last 3 months  (Followed by provider at [facility])   Prior Functional Level Assistance with the following:;Bathing;Dressing;Mobility;Shopping;Housework;Cooking   Current Functional Level Assistance with the following:;Bathing;Dressing;Toileting;Feeding;Cooking;Housework;Shopping;Mobility   Can patient return to prior living arrangement No   Ability to make needs known: Poor   Family able to assist with home care needs: No  ([facility] staff assists)   Would you like for me to discuss the discharge plan with any other family members/significant others, and if so, who? Yes   Financial Resources Medicare   Community Resources

## 2024-02-08 LAB
ANION GAP SERPL CALC-SCNC: 7 MMOL/L (ref 5–15)
BASOPHILS # BLD: 0 K/UL (ref 0–0.1)
BASOPHILS NFR BLD: 0 % (ref 0–1)
BUN SERPL-MCNC: 14 MG/DL (ref 6–20)
BUN/CREAT SERPL: 21 (ref 12–20)
CALCIUM SERPL-MCNC: 9.2 MG/DL (ref 8.5–10.1)
CHLORIDE SERPL-SCNC: 109 MMOL/L (ref 97–108)
CO2 SERPL-SCNC: 27 MMOL/L (ref 21–32)
CREAT SERPL-MCNC: 0.68 MG/DL (ref 0.55–1.02)
DIFFERENTIAL METHOD BLD: ABNORMAL
EOSINOPHIL # BLD: 0 K/UL (ref 0–0.4)
EOSINOPHIL NFR BLD: 0 % (ref 0–7)
ERYTHROCYTE [DISTWIDTH] IN BLOOD BY AUTOMATED COUNT: 12 % (ref 11.5–14.5)
GLUCOSE SERPL-MCNC: 112 MG/DL (ref 65–100)
HCT VFR BLD AUTO: 40.4 % (ref 35–47)
HGB BLD-MCNC: 13.2 G/DL (ref 11.5–16)
IMM GRANULOCYTES # BLD AUTO: 0.1 K/UL (ref 0–0.04)
IMM GRANULOCYTES NFR BLD AUTO: 1 % (ref 0–0.5)
LYMPHOCYTES # BLD: 1.5 K/UL (ref 0.8–3.5)
LYMPHOCYTES NFR BLD: 11 % (ref 12–49)
MCH RBC QN AUTO: 30.1 PG (ref 26–34)
MCHC RBC AUTO-ENTMCNC: 32.7 G/DL (ref 30–36.5)
MCV RBC AUTO: 92 FL (ref 80–99)
MONOCYTES # BLD: 0.8 K/UL (ref 0–1)
MONOCYTES NFR BLD: 6 % (ref 5–13)
NEUTS SEG # BLD: 11.1 K/UL (ref 1.8–8)
NEUTS SEG NFR BLD: 82 % (ref 32–75)
NRBC # BLD: 0 K/UL (ref 0–0.01)
NRBC BLD-RTO: 0 PER 100 WBC
PLATELET # BLD AUTO: 326 K/UL (ref 150–400)
PMV BLD AUTO: 10 FL (ref 8.9–12.9)
POTASSIUM SERPL-SCNC: 3.8 MMOL/L (ref 3.5–5.1)
RBC # BLD AUTO: 4.39 M/UL (ref 3.8–5.2)
SODIUM SERPL-SCNC: 143 MMOL/L (ref 136–145)
TSH SERPL DL<=0.05 MIU/L-ACNC: 1.87 UIU/ML (ref 0.36–3.74)
VIT B12 SERPL-MCNC: 351 PG/ML (ref 193–986)
WBC # BLD AUTO: 13.6 K/UL (ref 3.6–11)

## 2024-02-08 PROCEDURE — 85025 COMPLETE CBC W/AUTO DIFF WBC: CPT

## 2024-02-08 PROCEDURE — 82607 VITAMIN B-12: CPT

## 2024-02-08 PROCEDURE — 36415 COLL VENOUS BLD VENIPUNCTURE: CPT

## 2024-02-08 PROCEDURE — 97116 GAIT TRAINING THERAPY: CPT

## 2024-02-08 PROCEDURE — 6370000000 HC RX 637 (ALT 250 FOR IP): Performed by: INTERNAL MEDICINE

## 2024-02-08 PROCEDURE — 6360000002 HC RX W HCPCS: Performed by: INTERNAL MEDICINE

## 2024-02-08 PROCEDURE — 84443 ASSAY THYROID STIM HORMONE: CPT

## 2024-02-08 PROCEDURE — 80048 BASIC METABOLIC PNL TOTAL CA: CPT

## 2024-02-08 PROCEDURE — 51798 US URINE CAPACITY MEASURE: CPT

## 2024-02-08 PROCEDURE — 97530 THERAPEUTIC ACTIVITIES: CPT

## 2024-02-08 PROCEDURE — 1100000000 HC RM PRIVATE

## 2024-02-08 PROCEDURE — 97535 SELF CARE MNGMENT TRAINING: CPT

## 2024-02-08 PROCEDURE — 51701 INSERT BLADDER CATHETER: CPT

## 2024-02-08 RX ORDER — TAMSULOSIN HYDROCHLORIDE 0.4 MG/1
0.4 CAPSULE ORAL DAILY
Status: DISCONTINUED | OUTPATIENT
Start: 2024-02-08 | End: 2024-02-14 | Stop reason: HOSPADM

## 2024-02-08 RX ADMIN — BUSPIRONE HYDROCHLORIDE 15 MG: 10 TABLET ORAL at 09:42

## 2024-02-08 RX ADMIN — ASPIRIN 81 MG: 81 TABLET, CHEWABLE ORAL at 09:42

## 2024-02-08 RX ADMIN — Medication: at 09:45

## 2024-02-08 RX ADMIN — MIRTAZAPINE 15 MG: 15 TABLET, FILM COATED ORAL at 21:34

## 2024-02-08 RX ADMIN — Medication 1000 UNITS: at 09:42

## 2024-02-08 RX ADMIN — PRAVASTATIN SODIUM 80 MG: 40 TABLET ORAL at 21:34

## 2024-02-08 RX ADMIN — Medication: at 21:34

## 2024-02-08 RX ADMIN — LORAZEPAM 0.5 MG: 0.5 TABLET ORAL at 21:34

## 2024-02-08 RX ADMIN — HALOPERIDOL LACTATE 1 MG: 5 INJECTION, SOLUTION INTRAMUSCULAR at 17:38

## 2024-02-08 RX ADMIN — ENOXAPARIN SODIUM 40 MG: 100 INJECTION SUBCUTANEOUS at 09:42

## 2024-02-08 RX ADMIN — TAMSULOSIN HYDROCHLORIDE 0.4 MG: 0.4 CAPSULE ORAL at 13:46

## 2024-02-08 RX ADMIN — BUSPIRONE HYDROCHLORIDE 15 MG: 10 TABLET ORAL at 21:34

## 2024-02-08 NOTE — CARE COORDINATION
Transition of Care Plan:    RUR: 12% (low RUR)  Prior Level of Functioning: Needing some assistance  Disposition: SNF prior to returning to [facility]  If SNF or IPR: Date FOC offered: 02/07/24  Date FOC received: 02/07/24  Accepting facility: Referrals pending  Date authorization started with reference number:   Date authorization received and expires:   Follow up appointments: defer to SNF vs to be arranged  DME needed: defer to SNF vs none  Transportation at discharge: BLS likely needed  IM/IMM Medicare/ letter given: 2nd needed prior to d/c  Is patient a  and connected with VA? N/a   If yes, was  transfer form completed and VA notified? N/a  Caregiver Contact: Ellen Wahl; child; [phone]  Discharge Caregiver contacted prior to discharge? CM to contact  Care Conference needed? Not at this time  Barriers to discharge: Placement, clinical improvement    0814 - Family provided choice for 1) [facility] 2) [facility] 3) [facility] 4) [place] [facility]). [facility]     1154 - CM approcahed by pt's louis in [facility]. Louis states their preference is is [facility] and they would like her to transition to LTC there. CM notes she does not have Medicaid on file currently, CM will follow-up with family. CM contacted facility to discuss case. Not able to make contact, not able to leave voicemail.     1530 - CM able to make contact with [facility] admission coordinator Clinton @ O: [phone] M:[phone]. They have spoken with family re: admission and may have a bed available tomorrow, pending discharges. Admissions coordinator will be able to provide better estimation after their morning bed change meeting at 1030a. Facility unable to see referral in [facility], requested clinicals to be faxed to [phone]. CM will fax referral.       MARLY Bruce  Care Management

## 2024-02-08 NOTE — CARE COORDINATION
CM verified patient has a qualifying hospital stay for Skilled Nursing Facility.  .     MARLY Colmenares

## 2024-02-09 ENCOUNTER — APPOINTMENT (OUTPATIENT)
Facility: HOSPITAL | Age: 79
DRG: 641 | End: 2024-02-09
Payer: MEDICARE

## 2024-02-09 LAB
ANION GAP SERPL CALC-SCNC: 6 MMOL/L (ref 5–15)
APPEARANCE UR: ABNORMAL
BACTERIA URNS QL MICRO: ABNORMAL /HPF
BASOPHILS # BLD: 0 K/UL (ref 0–0.1)
BASOPHILS NFR BLD: 0 % (ref 0–1)
BILIRUB UR QL: NEGATIVE
BUN SERPL-MCNC: 23 MG/DL (ref 6–20)
BUN/CREAT SERPL: 34 (ref 12–20)
CALCIUM SERPL-MCNC: 9.1 MG/DL (ref 8.5–10.1)
CHLORIDE SERPL-SCNC: 110 MMOL/L (ref 97–108)
CO2 SERPL-SCNC: 26 MMOL/L (ref 21–32)
COLOR UR: ABNORMAL
CREAT SERPL-MCNC: 0.67 MG/DL (ref 0.55–1.02)
DIFFERENTIAL METHOD BLD: ABNORMAL
EOSINOPHIL # BLD: 0.1 K/UL (ref 0–0.4)
EOSINOPHIL NFR BLD: 1 % (ref 0–7)
EPITH CASTS URNS QL MICRO: ABNORMAL /LPF
ERYTHROCYTE [DISTWIDTH] IN BLOOD BY AUTOMATED COUNT: 12.4 % (ref 11.5–14.5)
GLUCOSE SERPL-MCNC: 101 MG/DL (ref 65–100)
GLUCOSE UR STRIP.AUTO-MCNC: NEGATIVE MG/DL
GRAN CASTS URNS QL MICRO: ABNORMAL /LPF
HCT VFR BLD AUTO: 38.7 % (ref 35–47)
HGB BLD-MCNC: 12.6 G/DL (ref 11.5–16)
HGB UR QL STRIP: ABNORMAL
IMM GRANULOCYTES # BLD AUTO: 0.1 K/UL (ref 0–0.04)
IMM GRANULOCYTES NFR BLD AUTO: 1 % (ref 0–0.5)
KETONES UR QL STRIP.AUTO: ABNORMAL MG/DL
LEUKOCYTE ESTERASE UR QL STRIP.AUTO: ABNORMAL
LYMPHOCYTES # BLD: 2 K/UL (ref 0.8–3.5)
LYMPHOCYTES NFR BLD: 20 % (ref 12–49)
MCH RBC QN AUTO: 30.2 PG (ref 26–34)
MCHC RBC AUTO-ENTMCNC: 32.6 G/DL (ref 30–36.5)
MCV RBC AUTO: 92.8 FL (ref 80–99)
MONOCYTES # BLD: 0.8 K/UL (ref 0–1)
MONOCYTES NFR BLD: 8 % (ref 5–13)
NEUTS SEG # BLD: 6.8 K/UL (ref 1.8–8)
NEUTS SEG NFR BLD: 70 % (ref 32–75)
NITRITE UR QL STRIP.AUTO: NEGATIVE
NRBC # BLD: 0 K/UL (ref 0–0.01)
NRBC BLD-RTO: 0 PER 100 WBC
PH UR STRIP: 6.5 (ref 5–8)
PLATELET # BLD AUTO: 326 K/UL (ref 150–400)
PMV BLD AUTO: 9.7 FL (ref 8.9–12.9)
POTASSIUM SERPL-SCNC: 4.1 MMOL/L (ref 3.5–5.1)
PROT UR STRIP-MCNC: 30 MG/DL
RBC # BLD AUTO: 4.17 M/UL (ref 3.8–5.2)
RBC #/AREA URNS HPF: ABNORMAL /HPF (ref 0–5)
SODIUM SERPL-SCNC: 142 MMOL/L (ref 136–145)
SP GR UR REFRACTOMETRY: 1.02
URINE CULTURE IF INDICATED: ABNORMAL
UROBILINOGEN UR QL STRIP.AUTO: 1 EU/DL (ref 0.2–1)
WBC # BLD AUTO: 9.9 K/UL (ref 3.6–11)
WBC URNS QL MICRO: >100 /HPF (ref 0–4)

## 2024-02-09 PROCEDURE — 2580000003 HC RX 258: Performed by: INTERNAL MEDICINE

## 2024-02-09 PROCEDURE — 6370000000 HC RX 637 (ALT 250 FOR IP): Performed by: INTERNAL MEDICINE

## 2024-02-09 PROCEDURE — 6360000002 HC RX W HCPCS: Performed by: INTERNAL MEDICINE

## 2024-02-09 PROCEDURE — 87086 URINE CULTURE/COLONY COUNT: CPT

## 2024-02-09 PROCEDURE — 36415 COLL VENOUS BLD VENIPUNCTURE: CPT

## 2024-02-09 PROCEDURE — 51798 US URINE CAPACITY MEASURE: CPT

## 2024-02-09 PROCEDURE — 51701 INSERT BLADDER CATHETER: CPT

## 2024-02-09 PROCEDURE — 80048 BASIC METABOLIC PNL TOTAL CA: CPT

## 2024-02-09 PROCEDURE — 81001 URINALYSIS AUTO W/SCOPE: CPT

## 2024-02-09 PROCEDURE — 1100000000 HC RM PRIVATE

## 2024-02-09 PROCEDURE — 85025 COMPLETE CBC W/AUTO DIFF WBC: CPT

## 2024-02-09 RX ORDER — POLYETHYLENE GLYCOL 3350 17 G/17G
17 POWDER, FOR SOLUTION ORAL DAILY
Status: DISCONTINUED | OUTPATIENT
Start: 2024-02-10 | End: 2024-02-14 | Stop reason: HOSPADM

## 2024-02-09 RX ADMIN — BUSPIRONE HYDROCHLORIDE 15 MG: 10 TABLET ORAL at 08:30

## 2024-02-09 RX ADMIN — MIRTAZAPINE 15 MG: 15 TABLET, FILM COATED ORAL at 21:04

## 2024-02-09 RX ADMIN — PRAVASTATIN SODIUM 80 MG: 40 TABLET ORAL at 21:04

## 2024-02-09 RX ADMIN — Medication: at 08:30

## 2024-02-09 RX ADMIN — BUSPIRONE HYDROCHLORIDE 15 MG: 10 TABLET ORAL at 21:04

## 2024-02-09 RX ADMIN — Medication: at 21:03

## 2024-02-09 RX ADMIN — SODIUM CHLORIDE 1000 MG: 900 INJECTION INTRAVENOUS at 09:23

## 2024-02-09 RX ADMIN — ASPIRIN 81 MG: 81 TABLET, CHEWABLE ORAL at 08:30

## 2024-02-09 RX ADMIN — ENOXAPARIN SODIUM 40 MG: 100 INJECTION SUBCUTANEOUS at 08:29

## 2024-02-09 RX ADMIN — POLYETHYLENE GLYCOL 3350 17 G: 17 POWDER, FOR SOLUTION ORAL at 12:25

## 2024-02-09 RX ADMIN — TAMSULOSIN HYDROCHLORIDE 0.4 MG: 0.4 CAPSULE ORAL at 08:30

## 2024-02-09 RX ADMIN — Medication 1000 UNITS: at 08:30

## 2024-02-09 NOTE — CARE COORDINATION
Transition of Care Plan:     RUR: 12% (low RUR)  Prior Level of Functioning: Needing some assistance  Disposition: SNF prior to returning to [facility]  If SNF or IPR: Date FOC offered: 02/07/24  Date FOC received: 02/07/24  Accepting facility: Referrals pending  Date authorization started with reference number:   Date authorization received and expires:   Follow up appointments: defer to SNF vs to be arranged  DME needed: defer to SNF vs none  Transportation at discharge: BLS likely needed  IM/IMM Medicare/ letter given: 2nd needed prior to d/c  Is patient a  and connected with VA? N/a              If yes, was [place] transfer form completed and VA notified? N/a  Caregiver Contact: Ellen Wahl; child; [phone]  Discharge Caregiver contacted prior to discharge? CM to contact  Care Conference needed? Not at this time  Barriers to discharge: Placement, clinical improvement    RADHA left  for [facility] and no return call per Corey he visited [place] and said they do not have any beds - CM never received a call back - CM got permission from Ellen lo to talk to Corey myers to assist with plans for placement - Ellen daughter is ill right now and can not visit at the hospital  CM sent medicals via CC link to [facility] in [place] and spoke with Michelle at [phone] to review referral.    staff to follow-up on Monday.  JARROD YOU, MSW  [phone]

## 2024-02-09 NOTE — CONSULTS
Palliative Medicine  Patient Name: Joana Colon  YOB: 1945  MRN: [id number]  Age: 78 y.o.  Gender: female    Date of Initial Consult: 24  Date of Service: 2024  Time: 12:14 PM  Provider: Rissa Yin MD  Hospital Day: 2  Admit Date: 2024  Referring Provider: Dr BIANKA Ziegler        Reasons for Consultation:  Goals of Care    HISTORY OF PRESENT ILLNESS (HPI):   Joana Colon is a 78 y.o. female with a past medical history of dementia, CAD s/p CABG  30 years ago who was admitted on 2024 from [facility] after slid out of her wheelchair onto floor- down for approximately 15-20 min. Head CT w/out acute findings. At baseline is alert/oriented x 2 and can communicate. Overnight tried to remove IV lines, get out of bed  and did require Haldol and Ativan. C diff pending.     Psychosocial: Pt -   about  years ago- has been very lonely since then. 2 sons Js and Cooper. Has always been very active- yard work, shopping etc. Big decline 2023- and just moved into Memory care one week ago. Had been walking (limited due to leg pain/weakness), started using w/c one week ago.        PALLIATIVE DIAGNOSES:    Dementia  Falls  Diarrhea   Palliative care encounter     ASSESSMENT AND PLAN:   Meet w/ pt who is alert to person and can make needs/wants known. Does not know situation. Her main concern is the IV beeping. Very agitated overnight, but calm now.   Speak to dtr in law Ellen- she is mPOA (will bring in AMD). Pt has 2 sons Js (Ellen's  ) and Cooper. Both dtr in laws are very involved. Learn that pt dx w/ dementia 10/2022 and was doing okay up until she had a UTI 2023 and declined steeply- at that time family had to do shifts so that someone would be w/ her . She moved into [facility] Care one week ago- family thought that the social aspect and activities would help.   Pt has not had any recent hospital stays, but family recognizes that this could be the start of 
me this has been minimally beneficial right now. Patient does not appear to be in any apparent discomfort at this time.  ml on my rounds this AM.     Labs 2024 note creatinine 0.67, WBC 9.9 down from 13.6 yesterday, hemoglobin stable.  UA 2024 positive for bacteria, large leuks, moderate epithelial cells, greater than 100 WBC, 2-5 granular casts, trace ketones,  RBC.  Questionable contamination.    Urine culture is pending.  No  imaging to review at this time.  She is now on IV Rocephin, first dose 2024 at 0845.      Temp (24hrs), Av.6 °F (36.4 °C), Min:97.3 °F (36.3 °C), Max:98.1 °F (36.7 °C)       No results found for: \"CREA\"  Anti-infectives Orders (From now, onward)      Start     Dose/Rate Route Frequency Ordered Stop    24 0845  cefTRIAXone (ROCEPHIN) 1,000 mg in sodium chloride 0.9 % 50 mL IVPB (mini-bag)        Question Answer Comment   Antimicrobial Indications Urinary Tract Infection    UTI duration of therapy 3 days        1,000 mg  100 mL/hr over 30 Minutes IntraVENous EVERY 24 HOURS 24 0815 24 0844          enoxaparin - 40 MG/0.4ML  Diet: ADULT DIET; Easy to Chew -       Labs     Lab Results   Component Value Date/Time    WBC 9.9 2024 05:02 AM    HCT 38.7 2024 05:02 AM     2024 05:02 AM     2024 05:02 AM    K 4.1 2024 05:02 AM     2024 05:02 AM    CO2 26 2024 05:02 AM    BUN 23 2024 05:02 AM    MG 1.8 2024 03:41 AM     UA: No components found for: \"COLOR\", \"APPRN\", \"SPGRU\", \"REFSG\", \"LAVELLE\", \"PROTU\", \"GLUCU\", \"KETU\", \"BILU\", \"UROU\", \"ANDRE\", \"LEUKU\", \"GLUKE\", \"EPSU\", \"BACTU\", \"WBCU\", \"RBCU\", \"CASTS\", \"UCRY\"  Imaging     === 24 ===    CT HEAD WO CONTRAST    - Narrative -  HEAD CT WITHOUT CONTRAST: 2024 2:30 PM    INDICATION: Fall, head trauma    COMPARISON: 2024.    PROCEDURE: Axial images of the head were obtained without contrast. Coronal and  sagittal reformats were

## 2024-02-10 LAB
ANION GAP SERPL CALC-SCNC: 5 MMOL/L (ref 5–15)
BASOPHILS # BLD: 0 K/UL (ref 0–0.1)
BASOPHILS NFR BLD: 0 % (ref 0–1)
BUN SERPL-MCNC: 22 MG/DL (ref 6–20)
BUN/CREAT SERPL: 34 (ref 12–20)
CALCIUM SERPL-MCNC: 9.2 MG/DL (ref 8.5–10.1)
CHLORIDE SERPL-SCNC: 108 MMOL/L (ref 97–108)
CO2 SERPL-SCNC: 28 MMOL/L (ref 21–32)
CREAT SERPL-MCNC: 0.64 MG/DL (ref 0.55–1.02)
DIFFERENTIAL METHOD BLD: ABNORMAL
EOSINOPHIL # BLD: 0.1 K/UL (ref 0–0.4)
EOSINOPHIL NFR BLD: 1 % (ref 0–7)
ERYTHROCYTE [DISTWIDTH] IN BLOOD BY AUTOMATED COUNT: 12.3 % (ref 11.5–14.5)
GLUCOSE SERPL-MCNC: 96 MG/DL (ref 65–100)
HCT VFR BLD AUTO: 39.3 % (ref 35–47)
HGB BLD-MCNC: 12.5 G/DL (ref 11.5–16)
IMM GRANULOCYTES # BLD AUTO: 0.1 K/UL (ref 0–0.04)
IMM GRANULOCYTES NFR BLD AUTO: 1 % (ref 0–0.5)
LYMPHOCYTES # BLD: 1.9 K/UL (ref 0.8–3.5)
LYMPHOCYTES NFR BLD: 17 % (ref 12–49)
MCH RBC QN AUTO: 29.6 PG (ref 26–34)
MCHC RBC AUTO-ENTMCNC: 31.8 G/DL (ref 30–36.5)
MCV RBC AUTO: 93.1 FL (ref 80–99)
MONOCYTES # BLD: 0.9 K/UL (ref 0–1)
MONOCYTES NFR BLD: 8 % (ref 5–13)
NEUTS SEG # BLD: 8 K/UL (ref 1.8–8)
NEUTS SEG NFR BLD: 73 % (ref 32–75)
NRBC # BLD: 0 K/UL (ref 0–0.01)
NRBC BLD-RTO: 0 PER 100 WBC
PLATELET # BLD AUTO: 365 K/UL (ref 150–400)
PMV BLD AUTO: 10 FL (ref 8.9–12.9)
POTASSIUM SERPL-SCNC: 3.8 MMOL/L (ref 3.5–5.1)
RBC # BLD AUTO: 4.22 M/UL (ref 3.8–5.2)
SODIUM SERPL-SCNC: 141 MMOL/L (ref 136–145)
WBC # BLD AUTO: 11 K/UL (ref 3.6–11)

## 2024-02-10 PROCEDURE — 6360000002 HC RX W HCPCS: Performed by: INTERNAL MEDICINE

## 2024-02-10 PROCEDURE — 1100000000 HC RM PRIVATE

## 2024-02-10 PROCEDURE — 36415 COLL VENOUS BLD VENIPUNCTURE: CPT

## 2024-02-10 PROCEDURE — 51701 INSERT BLADDER CATHETER: CPT

## 2024-02-10 PROCEDURE — 80048 BASIC METABOLIC PNL TOTAL CA: CPT

## 2024-02-10 PROCEDURE — 85025 COMPLETE CBC W/AUTO DIFF WBC: CPT

## 2024-02-10 PROCEDURE — 6370000000 HC RX 637 (ALT 250 FOR IP): Performed by: INTERNAL MEDICINE

## 2024-02-10 PROCEDURE — 51798 US URINE CAPACITY MEASURE: CPT

## 2024-02-10 PROCEDURE — 2580000003 HC RX 258: Performed by: INTERNAL MEDICINE

## 2024-02-10 RX ADMIN — SODIUM CHLORIDE 1000 MG: 900 INJECTION INTRAVENOUS at 08:58

## 2024-02-10 RX ADMIN — ENOXAPARIN SODIUM 40 MG: 100 INJECTION SUBCUTANEOUS at 08:58

## 2024-02-10 RX ADMIN — MIRTAZAPINE 15 MG: 15 TABLET, FILM COATED ORAL at 20:48

## 2024-02-10 RX ADMIN — ASPIRIN 81 MG: 81 TABLET, CHEWABLE ORAL at 08:59

## 2024-02-10 RX ADMIN — BUSPIRONE HYDROCHLORIDE 15 MG: 10 TABLET ORAL at 08:59

## 2024-02-10 RX ADMIN — Medication: at 08:59

## 2024-02-10 RX ADMIN — Medication: at 20:48

## 2024-02-10 RX ADMIN — POLYETHYLENE GLYCOL 3350 17 G: 17 POWDER, FOR SOLUTION ORAL at 08:59

## 2024-02-10 RX ADMIN — TAMSULOSIN HYDROCHLORIDE 0.4 MG: 0.4 CAPSULE ORAL at 08:59

## 2024-02-10 RX ADMIN — Medication 1000 UNITS: at 08:59

## 2024-02-10 RX ADMIN — PRAVASTATIN SODIUM 80 MG: 40 TABLET ORAL at 20:48

## 2024-02-10 RX ADMIN — BUSPIRONE HYDROCHLORIDE 15 MG: 10 TABLET ORAL at 20:48

## 2024-02-11 LAB
ANION GAP SERPL CALC-SCNC: 5 MMOL/L (ref 5–15)
BACTERIA SPEC CULT: NORMAL
BUN SERPL-MCNC: 20 MG/DL (ref 6–20)
BUN/CREAT SERPL: 24 (ref 12–20)
CALCIUM SERPL-MCNC: 9.4 MG/DL (ref 8.5–10.1)
CC UR VC: NORMAL
CHLORIDE SERPL-SCNC: 110 MMOL/L (ref 97–108)
CO2 SERPL-SCNC: 27 MMOL/L (ref 21–32)
CREAT SERPL-MCNC: 0.82 MG/DL (ref 0.55–1.02)
ERYTHROCYTE [DISTWIDTH] IN BLOOD BY AUTOMATED COUNT: 12.4 % (ref 11.5–14.5)
GLUCOSE SERPL-MCNC: 143 MG/DL (ref 65–100)
HCT VFR BLD AUTO: 39.6 % (ref 35–47)
HGB BLD-MCNC: 12.5 G/DL (ref 11.5–16)
MCH RBC QN AUTO: 29.2 PG (ref 26–34)
MCHC RBC AUTO-ENTMCNC: 31.6 G/DL (ref 30–36.5)
MCV RBC AUTO: 92.5 FL (ref 80–99)
NRBC # BLD: 0 K/UL (ref 0–0.01)
NRBC BLD-RTO: 0 PER 100 WBC
PLATELET # BLD AUTO: 379 K/UL (ref 150–400)
PMV BLD AUTO: 9.7 FL (ref 8.9–12.9)
POTASSIUM SERPL-SCNC: 4.3 MMOL/L (ref 3.5–5.1)
RBC # BLD AUTO: 4.28 M/UL (ref 3.8–5.2)
SERVICE CMNT-IMP: NORMAL
SODIUM SERPL-SCNC: 142 MMOL/L (ref 136–145)
WBC # BLD AUTO: 9.4 K/UL (ref 3.6–11)

## 2024-02-11 PROCEDURE — 6360000002 HC RX W HCPCS: Performed by: INTERNAL MEDICINE

## 2024-02-11 PROCEDURE — 6370000000 HC RX 637 (ALT 250 FOR IP): Performed by: INTERNAL MEDICINE

## 2024-02-11 PROCEDURE — 80048 BASIC METABOLIC PNL TOTAL CA: CPT

## 2024-02-11 PROCEDURE — 51701 INSERT BLADDER CATHETER: CPT

## 2024-02-11 PROCEDURE — 2580000003 HC RX 258: Performed by: INTERNAL MEDICINE

## 2024-02-11 PROCEDURE — 1100000000 HC RM PRIVATE

## 2024-02-11 PROCEDURE — 85027 COMPLETE CBC AUTOMATED: CPT

## 2024-02-11 PROCEDURE — 36415 COLL VENOUS BLD VENIPUNCTURE: CPT

## 2024-02-11 PROCEDURE — 51798 US URINE CAPACITY MEASURE: CPT

## 2024-02-11 RX ADMIN — PRAVASTATIN SODIUM 80 MG: 40 TABLET ORAL at 20:35

## 2024-02-11 RX ADMIN — LORAZEPAM 0.5 MG: 0.5 TABLET ORAL at 18:25

## 2024-02-11 RX ADMIN — Medication: at 09:00

## 2024-02-11 RX ADMIN — BUSPIRONE HYDROCHLORIDE 15 MG: 10 TABLET ORAL at 20:35

## 2024-02-11 RX ADMIN — SODIUM CHLORIDE 1000 MG: 900 INJECTION INTRAVENOUS at 08:59

## 2024-02-11 RX ADMIN — HALOPERIDOL LACTATE 1 MG: 5 INJECTION, SOLUTION INTRAMUSCULAR at 09:16

## 2024-02-11 RX ADMIN — Medication: at 20:35

## 2024-02-11 RX ADMIN — MIRTAZAPINE 15 MG: 15 TABLET, FILM COATED ORAL at 20:35

## 2024-02-12 LAB
ANION GAP SERPL CALC-SCNC: 3 MMOL/L (ref 5–15)
BUN SERPL-MCNC: 19 MG/DL (ref 6–20)
BUN/CREAT SERPL: 30 (ref 12–20)
CALCIUM SERPL-MCNC: 9.4 MG/DL (ref 8.5–10.1)
CHLORIDE SERPL-SCNC: 108 MMOL/L (ref 97–108)
CO2 SERPL-SCNC: 29 MMOL/L (ref 21–32)
CREAT SERPL-MCNC: 0.63 MG/DL (ref 0.55–1.02)
ERYTHROCYTE [DISTWIDTH] IN BLOOD BY AUTOMATED COUNT: 12.2 % (ref 11.5–14.5)
GLUCOSE SERPL-MCNC: 104 MG/DL (ref 65–100)
HCT VFR BLD AUTO: 37.8 % (ref 35–47)
HGB BLD-MCNC: 12.1 G/DL (ref 11.5–16)
MCH RBC QN AUTO: 29.2 PG (ref 26–34)
MCHC RBC AUTO-ENTMCNC: 32 G/DL (ref 30–36.5)
MCV RBC AUTO: 91.1 FL (ref 80–99)
NRBC # BLD: 0 K/UL (ref 0–0.01)
NRBC BLD-RTO: 0 PER 100 WBC
PLATELET # BLD AUTO: 410 K/UL (ref 150–400)
PMV BLD AUTO: 9.4 FL (ref 8.9–12.9)
POTASSIUM SERPL-SCNC: 3.7 MMOL/L (ref 3.5–5.1)
RBC # BLD AUTO: 4.15 M/UL (ref 3.8–5.2)
SODIUM SERPL-SCNC: 140 MMOL/L (ref 136–145)
WBC # BLD AUTO: 7.2 K/UL (ref 3.6–11)

## 2024-02-12 PROCEDURE — 6360000002 HC RX W HCPCS: Performed by: INTERNAL MEDICINE

## 2024-02-12 PROCEDURE — 51798 US URINE CAPACITY MEASURE: CPT

## 2024-02-12 PROCEDURE — 51701 INSERT BLADDER CATHETER: CPT

## 2024-02-12 PROCEDURE — 6370000000 HC RX 637 (ALT 250 FOR IP): Performed by: INTERNAL MEDICINE

## 2024-02-12 PROCEDURE — 1100000000 HC RM PRIVATE

## 2024-02-12 PROCEDURE — 97535 SELF CARE MNGMENT TRAINING: CPT

## 2024-02-12 PROCEDURE — 36415 COLL VENOUS BLD VENIPUNCTURE: CPT

## 2024-02-12 PROCEDURE — 85027 COMPLETE CBC AUTOMATED: CPT

## 2024-02-12 PROCEDURE — 80048 BASIC METABOLIC PNL TOTAL CA: CPT

## 2024-02-12 PROCEDURE — 97530 THERAPEUTIC ACTIVITIES: CPT

## 2024-02-12 RX ADMIN — POLYETHYLENE GLYCOL 3350 17 G: 17 POWDER, FOR SOLUTION ORAL at 08:48

## 2024-02-12 RX ADMIN — Medication: at 20:27

## 2024-02-12 RX ADMIN — PRAVASTATIN SODIUM 80 MG: 40 TABLET ORAL at 20:26

## 2024-02-12 RX ADMIN — ENOXAPARIN SODIUM 40 MG: 100 INJECTION SUBCUTANEOUS at 08:16

## 2024-02-12 RX ADMIN — TAMSULOSIN HYDROCHLORIDE 0.4 MG: 0.4 CAPSULE ORAL at 08:51

## 2024-02-12 RX ADMIN — BUSPIRONE HYDROCHLORIDE 15 MG: 10 TABLET ORAL at 20:26

## 2024-02-12 RX ADMIN — BUSPIRONE HYDROCHLORIDE 15 MG: 10 TABLET ORAL at 08:51

## 2024-02-12 RX ADMIN — MIRTAZAPINE 15 MG: 15 TABLET, FILM COATED ORAL at 20:26

## 2024-02-12 RX ADMIN — Medication: at 08:51

## 2024-02-12 RX ADMIN — ASPIRIN 81 MG: 81 TABLET, CHEWABLE ORAL at 08:51

## 2024-02-12 RX ADMIN — Medication 1000 UNITS: at 08:51

## 2024-02-12 RX ADMIN — HALOPERIDOL LACTATE 1 MG: 5 INJECTION, SOLUTION INTRAMUSCULAR at 20:39

## 2024-02-12 RX ADMIN — LORAZEPAM 0.5 MG: 0.5 TABLET ORAL at 20:26

## 2024-02-12 NOTE — CARE COORDINATION
Transition of Care Plan:    RUR: 12% (low RUR)  Prior Level of Functioning: Needing some assistance   Disposition: SNF with possible transition to LTC OR return to [facility]  If SNF or IPR: Date FOC offered: 02/07/24  Date FOC received: 02/07/24  Accepting facility: Referrals pending  Date authorization started with reference number:   Date authorization received and expires:   Follow up appointments: defer to SNF vs to be arranged  DME needed: defer to SNF vs none  Transportation at discharge: BLS likely needed  IM/IMM Medicare/ letter given: 2nd needed prior to d/c  Is patient a [place] and connected with [facility]? N/a   If yes, was  transfer form completed and VA notified? N/a  Caregiver Contact: Ellen Wahl; child; [phone]   Discharge Caregiver contacted prior to discharge? CM to cntact  Care Conference needed? Not at this time  Barriers to discharge: Placement    0845 - CM to follow up on referrals to [facility] ([phone]) and [facility] ([phone]; [phone]) today.     1318 - Per [facility], they are awaiting financial information from the patient. Per [place], they will not have a female bed available for the foreseeable future. CM followed-up with family and requested they follow-up with SNF for the financial info.     1540 - CM contacted by pt's grandson, CM relayed information previously shared with pt's DTR. CM provide info for [facility]. Per admissions coordinator, they are ready to move forward once financial info requested has been received.    MARLY Bruce  Care Management

## 2024-02-13 LAB
ANION GAP SERPL CALC-SCNC: 5 MMOL/L (ref 5–15)
BUN SERPL-MCNC: 19 MG/DL (ref 6–20)
BUN/CREAT SERPL: 26 (ref 12–20)
CALCIUM SERPL-MCNC: 9.1 MG/DL (ref 8.5–10.1)
CHLORIDE SERPL-SCNC: 111 MMOL/L (ref 97–108)
CO2 SERPL-SCNC: 24 MMOL/L (ref 21–32)
CREAT SERPL-MCNC: 0.74 MG/DL (ref 0.55–1.02)
ERYTHROCYTE [DISTWIDTH] IN BLOOD BY AUTOMATED COUNT: 12.2 % (ref 11.5–14.5)
GLUCOSE SERPL-MCNC: 85 MG/DL (ref 65–100)
HCT VFR BLD AUTO: 39.2 % (ref 35–47)
HGB BLD-MCNC: 12.5 G/DL (ref 11.5–16)
MCH RBC QN AUTO: 29.9 PG (ref 26–34)
MCHC RBC AUTO-ENTMCNC: 31.9 G/DL (ref 30–36.5)
MCV RBC AUTO: 93.8 FL (ref 80–99)
NRBC # BLD: 0 K/UL (ref 0–0.01)
NRBC BLD-RTO: 0 PER 100 WBC
PLATELET # BLD AUTO: 389 K/UL (ref 150–400)
PMV BLD AUTO: 9.4 FL (ref 8.9–12.9)
POTASSIUM SERPL-SCNC: 4.3 MMOL/L (ref 3.5–5.1)
RBC # BLD AUTO: 4.18 M/UL (ref 3.8–5.2)
SODIUM SERPL-SCNC: 140 MMOL/L (ref 136–145)
WBC # BLD AUTO: 8.6 K/UL (ref 3.6–11)

## 2024-02-13 PROCEDURE — 1100000000 HC RM PRIVATE

## 2024-02-13 PROCEDURE — 6370000000 HC RX 637 (ALT 250 FOR IP): Performed by: STUDENT IN AN ORGANIZED HEALTH CARE EDUCATION/TRAINING PROGRAM

## 2024-02-13 PROCEDURE — 6370000000 HC RX 637 (ALT 250 FOR IP): Performed by: INTERNAL MEDICINE

## 2024-02-13 PROCEDURE — 51798 US URINE CAPACITY MEASURE: CPT

## 2024-02-13 PROCEDURE — 80048 BASIC METABOLIC PNL TOTAL CA: CPT

## 2024-02-13 PROCEDURE — 97535 SELF CARE MNGMENT TRAINING: CPT

## 2024-02-13 PROCEDURE — 36415 COLL VENOUS BLD VENIPUNCTURE: CPT

## 2024-02-13 PROCEDURE — 51701 INSERT BLADDER CATHETER: CPT

## 2024-02-13 PROCEDURE — 85027 COMPLETE CBC AUTOMATED: CPT

## 2024-02-13 PROCEDURE — 6360000002 HC RX W HCPCS: Performed by: INTERNAL MEDICINE

## 2024-02-13 RX ORDER — LACTULOSE 10 G/15ML
20 SOLUTION ORAL ONCE
Status: COMPLETED | OUTPATIENT
Start: 2024-02-13 | End: 2024-02-13

## 2024-02-13 RX ADMIN — Medication 1000 UNITS: at 08:56

## 2024-02-13 RX ADMIN — PRAVASTATIN SODIUM 80 MG: 40 TABLET ORAL at 20:36

## 2024-02-13 RX ADMIN — POLYETHYLENE GLYCOL 3350 17 G: 17 POWDER, FOR SOLUTION ORAL at 08:56

## 2024-02-13 RX ADMIN — HALOPERIDOL LACTATE 1 MG: 5 INJECTION, SOLUTION INTRAMUSCULAR at 22:13

## 2024-02-13 RX ADMIN — ASPIRIN 81 MG: 81 TABLET, CHEWABLE ORAL at 08:56

## 2024-02-13 RX ADMIN — BUSPIRONE HYDROCHLORIDE 15 MG: 10 TABLET ORAL at 20:36

## 2024-02-13 RX ADMIN — MIRTAZAPINE 15 MG: 15 TABLET, FILM COATED ORAL at 20:36

## 2024-02-13 RX ADMIN — ENOXAPARIN SODIUM 40 MG: 100 INJECTION SUBCUTANEOUS at 08:56

## 2024-02-13 RX ADMIN — LACTULOSE 20 G: 20 SOLUTION ORAL at 09:50

## 2024-02-13 RX ADMIN — BUSPIRONE HYDROCHLORIDE 15 MG: 10 TABLET ORAL at 08:56

## 2024-02-13 RX ADMIN — Medication: at 08:56

## 2024-02-13 RX ADMIN — Medication: at 20:36

## 2024-02-13 RX ADMIN — TAMSULOSIN HYDROCHLORIDE 0.4 MG: 0.4 CAPSULE ORAL at 08:56

## 2024-02-13 NOTE — CARE COORDINATION
Transition of Care Plan:     RUR: 12% (low RUR)  Prior Level of Functioning: Needing some assistance         Disposition: SNF with possible transition to LTC OR return to [facility]  If SNF or IPR: Date FOC offered: 02/07/24  Date FOC received: 02/07/24  Accepting facility: Referrals pending  Date authorization started with reference number:   Date authorization received and expires:   Follow up appointments: defer to SNF vs to be arranged  DME needed: defer to SNF vs none  Transportation at discharge: BLS likely needed  IM/IMM Medicare/ letter given: 2nd needed prior to d/c  Is patient a [place] and connected with [facility]? N/a              If yes, was  transfer form completed and VA notified? N/a  Caregiver Contact: Ellen Wahl; child; [phone]   Discharge Caregiver contacted prior to discharge? CM to [facility]  Care Conference needed? Not at this time  Barriers to discharge: Placement, urine cx    0808 - Placement still awaiting financial info.     1144 - Placement working with family to obtain clinicals from jail in order to have better picture of pt's clinical course. CM following for any support.    1334 - Per request of facility, CM contacted [facility] liaison Carlos A to confirm pt's dates of stay at [facility]. Unable to make contact, unable to leave message. CM escalated to ED will Donya, he will have liaison call CM back. Carlos A called CM back, verified that pt moved into jail 01/29-02/04. Pt admitted to ED at [facility] on 02/04/24.    MARLY Bruec  Care Management

## 2024-02-14 LAB
ANION GAP SERPL CALC-SCNC: 6 MMOL/L (ref 5–15)
BUN SERPL-MCNC: 19 MG/DL (ref 6–20)
BUN/CREAT SERPL: 32 (ref 12–20)
CALCIUM SERPL-MCNC: 9 MG/DL (ref 8.5–10.1)
CHLORIDE SERPL-SCNC: 110 MMOL/L (ref 97–108)
CO2 SERPL-SCNC: 25 MMOL/L (ref 21–32)
CREAT SERPL-MCNC: 0.59 MG/DL (ref 0.55–1.02)
ERYTHROCYTE [DISTWIDTH] IN BLOOD BY AUTOMATED COUNT: 12.3 % (ref 11.5–14.5)
GLUCOSE SERPL-MCNC: 95 MG/DL (ref 65–100)
HCT VFR BLD AUTO: 37.5 % (ref 35–47)
HGB BLD-MCNC: 11.8 G/DL (ref 11.5–16)
MCH RBC QN AUTO: 28.9 PG (ref 26–34)
MCHC RBC AUTO-ENTMCNC: 31.5 G/DL (ref 30–36.5)
MCV RBC AUTO: 91.9 FL (ref 80–99)
NRBC # BLD: 0 K/UL (ref 0–0.01)
NRBC BLD-RTO: 0 PER 100 WBC
PLATELET # BLD AUTO: 409 K/UL (ref 150–400)
PMV BLD AUTO: 9.4 FL (ref 8.9–12.9)
POTASSIUM SERPL-SCNC: 4.4 MMOL/L (ref 3.5–5.1)
RBC # BLD AUTO: 4.08 M/UL (ref 3.8–5.2)
SODIUM SERPL-SCNC: 141 MMOL/L (ref 136–145)
WBC # BLD AUTO: 8.8 K/UL (ref 3.6–11)

## 2024-02-14 PROCEDURE — 85027 COMPLETE CBC AUTOMATED: CPT

## 2024-02-14 PROCEDURE — 51798 US URINE CAPACITY MEASURE: CPT

## 2024-02-14 PROCEDURE — 6370000000 HC RX 637 (ALT 250 FOR IP): Performed by: INTERNAL MEDICINE

## 2024-02-14 PROCEDURE — 80048 BASIC METABOLIC PNL TOTAL CA: CPT

## 2024-02-14 PROCEDURE — 6360000002 HC RX W HCPCS: Performed by: INTERNAL MEDICINE

## 2024-02-14 PROCEDURE — 36415 COLL VENOUS BLD VENIPUNCTURE: CPT

## 2024-02-14 RX ADMIN — BUSPIRONE HYDROCHLORIDE 15 MG: 10 TABLET ORAL at 10:03

## 2024-02-14 RX ADMIN — TAMSULOSIN HYDROCHLORIDE 0.4 MG: 0.4 CAPSULE ORAL at 10:03

## 2024-02-14 RX ADMIN — ENOXAPARIN SODIUM 40 MG: 100 INJECTION SUBCUTANEOUS at 10:10

## 2024-02-14 RX ADMIN — ASPIRIN 81 MG: 81 TABLET, CHEWABLE ORAL at 10:03

## 2024-02-14 RX ADMIN — POLYETHYLENE GLYCOL 3350 17 G: 17 POWDER, FOR SOLUTION ORAL at 10:04

## 2024-02-14 RX ADMIN — Medication: at 10:14

## 2024-02-14 RX ADMIN — Medication 1000 UNITS: at 10:04

## 2024-02-14 NOTE — PROGRESS NOTES
Hospitalist Progress Note    NAME:   Joana Colon   : 1945   MRN: [id number]     Date/Time: 2024 12:46 PM  Patient PCP: Gavi Meredith MD    Estimated discharge date:   Barriers: SNF placement, B12, TSH, improvement of confusion      Assessment / Plan    Update   -C. difficile is negative.  Enteric bacterial panel was not sent.  Diarrhea is better.  She is not on any antibiotics.  -Blood pressure is stable and her home antihypertensives are being held due to borderline low blood pressure.  Continue to hold  -Discussed with son who told me that her confusion has been lately getting worse in the last few months and she does have dementia at home but since he went into the nursing home, her confusion is much worse.  UA is within normal limits.  She is currently alert awake.  Check vitamin B12 and TSH to rule out reversible causes.  Urine culture negative.  -She was evaluated by PT and recommended SNF placement.  Case management consulted for SNF placement.  Updated son at bedside      Ground-level fall  Ambulatory dysfunction  History of diarrhea concerning for C. difficile infection  Hypokalemia  Mild hypernatremia     C/w IV fluid with half-normal saline with potassium, stool cultures and stain and C. difficile testing  Patient lives in the memory care unit  Repeat BMP  Na back to wnl      Severe dementia with behavioral disturbances  Will resume home meds including Remeron 15 mg at bedtime, BuSpar  As needed Haldol     Hypertension  BP soft, hold Cozaar metoprolol     Hyperlipidemia  Resume pravastatin           Medical Decision Making:   I personally reviewed labs: CBC BMP  I personally reviewed imaging:  I personally reviewed EKG: Yes, no acute ST changes  Toxic drug monitoring: CBC while on Lovenox  Discussed case with: palliative care regarding need to address goals of care    Code Status: Full code per nursing home papers   DVT Prophylaxis: Lovenox  Baseline: Lives in a memory care 
      Hospitalist Progress Note    NAME:   Joana Colon   : 1945   MRN: [id number]     Date/Time: 2024 11:47 AM  Patient PCP: Gavi Meredith MD    Estimated discharge date:   Barriers: Urology clearance, urinary retention urine culture      Assessment / Plan    Acute UTI  Urinary retention  Constipation.  -continue ceftriaxone 1gm iv daily  -appreciate urology consult  -continue flomax  -PVR Bladder Scans q 6 hrs with intermittent straight catheterization for PVRs >300 ml   -will treat constipation with miralax once daily- will change to BID if doesn't have BM      Ground-level fall  Ambulatory dysfunction  -CT head shows no acute process.  X-ray of the right shoulder is within normal limits.  -Evaluated by PT OT and recommended SNF placement.  Pending SNF placement  -Vitamin B12 is 351.  TSH is normal.  -Patient will need comprehensive evaluation including vision and also hearing assessment to prevent further falls.  -She does not report any back pain        Diarrhea  -C. difficile negative.  Diarrhea resolved.  Electrolytes are normal.       Severe dementia with behavioral disturbances  -Continue PTA BuSpar, Remeron     Hypertension  -Holding home metoprolol and Cozaar due to borderline low blood pressure.     Hyperlipidemia  -Continue PTA statin           Medical Decision Making:   I personally reviewed labs: CBC and BMP  I personally reviewed imaging:  I personally reviewed EKG:   Toxic drug monitoring: Monitor hemoglobin while on Lovenox  Discussed case with: Pharmacy,  and nursing  Code Status: Full code   DVT Prophylaxis: Lovenox    Baseline: Lives in a memory care unit,   Subjective:     Chief Complaint / Reason for Physician Visit  Patient seen and examined this morning, trying to have her breakfast, she is awake alert, but she is confused, does not want to stay in the hospital anymore, explained to her the plan of management so far        Objective:     VITALS:   Last 
      Hospitalist Progress Note    NAME:   Joana Colon   : 1945   MRN: [id number]     Date/Time: 2024 1:06 PM  Patient PCP: Gavi Meredith MD    Estimated discharge date:   Barriers: urine culture      Assessment / Plan    Acute UTI  Urinary retention  Constipation.  -start ceftriaxone 1gm iv daily  -appreciate urology consult  -continue flomax  -PVR Bladder Scans q 6 hrs with intermittent straight catheterization for PVRs >300 ml   -will treat constipation with miralax once daily- will change to BID if doesn't have BM      Ground-level fall  Ambulatory dysfunction  -CT head shows no acute process.  X-ray of the right shoulder is within normal limits.  -Evaluated by PT OT and recommended SNF placement.  Pending SNF placement  -Vitamin B12 is 351.  TSH is normal.  -Patient will need comprehensive evaluation including vision and also hearing assessment to prevent further falls.  -She does not report any back pain        Diarrhea  -C. difficile negative.  Diarrhea resolved.  Electrolytes are normal.       Severe dementia with behavioral disturbances  -Continue PTA BuSpar, Remeron     Hypertension  -Holding home metoprolol and Cozaar due to borderline low blood pressure.     Hyperlipidemia  -Continue PTA statin           Medical Decision Making:   I personally reviewed labs: CBC and BMP  I personally reviewed imaging:  I personally reviewed EKG:   Toxic drug monitoring: Monitor hemoglobin while on Lovenox  Discussed case with: Pharmacy,  and nursing  Code Status: Full code   DVT Prophylaxis: Lovenox    Baseline: Lives in a memory care unit,   Subjective:     Chief Complaint / Reason for Physician Visit  She is alert, awake and confused  Was in restroom attempting to urinate without success.  Reports that he wants to poop, not able to.        Objective:     VITALS:   Last 24hrs VS reviewed since prior progress note. Most recent are:  Patient Vitals for the past 24 hrs:   BP Temp Temp src 
      Hospitalist Progress Note    NAME:   Joana Colon   : 1945   MRN: [id number]     Date/Time: 2024 3:05 PM  Patient PCP: Gavi Meredith MD    Estimated discharge date:   Barriers:urine culture      Assessment / Plan    Acute UTI  Urinary retention  Constipation.  -continue ceftriaxone 1gm iv daily  -appreciate urology consult  -continue flomax  -PVR Bladder Scans q 6 hrs with intermittent straight catheterization for PVRs >300 ml   -will treat constipation with miralax once daily- will change to BID if doesn't have BM  Urine culture final result:  >2 organisms - likely contaminated specimen.          Ground-level fall  Ambulatory dysfunction  -CT head shows no acute process.  X-ray of the right shoulder is within normal limits.  -Evaluated by PT OT and recommended SNF placement.  Pending SNF placement  -Vitamin B12 is 351.  TSH is normal.  -Patient will need comprehensive evaluation including vision and also hearing assessment to prevent further falls.  -She does not report any back pain        Diarrhea  -C. difficile negative.  Diarrhea resolved.  Electrolytes are normal.       Severe dementia with behavioral disturbances  -Continue PTA BuSpar, Remeron     Hypertension  -Holding home metoprolol and Cozaar due to borderline low blood pressure.     Hyperlipidemia  -Continue PTA statin           Medical Decision Making:   I personally reviewed labs: CBC and BMP  I personally reviewed imaging:  I personally reviewed EKG:   Toxic drug monitoring: Monitor hemoglobin while on Lovenox  Discussed case with: Pharmacy,  and nursing  Code Status: Full code   DVT Prophylaxis: Lovenox    Baseline: Lives in a memory care unit,   Subjective:     Chief Complaint / Reason for Physician Visit  Patient seen and examined this morning, trying to have her breakfast, she is awake alert, but she is confused, does not want to stay in the hospital anymore, explained to her the plan of management so 
      Hospitalist Progress Note    NAME:   Joana Colon   : 1945   MRN: [id number]     Date/Time: 2024 12:36 PM  Patient PCP: Gavi Meredith MD    Estimated discharge date:   Barriers: SNF placement, improvement of urinary retention, urology evaluation, improvement of leukocytosis      Assessment / Plan    Ground-level fall  Ambulatory dysfunction  -CT head shows no acute process.  X-ray of the right shoulder is within normal limits.  -Evaluated by PT OT and recommended SNF placement.  Pending SNF placement  -Vitamin B12 is 351.  TSH is normal.  -Patient will need comprehensive evaluation including vision and also hearing assessment to prevent further falls.  -She does not report any back pain    Urinary retention  -Patient continues to need frequent straight cath.  UA on  was normal.  -Will repeat urine analysis  -Start Flomax.  Consult urology.  She may need Luevano catheter.  -Continue PT OT    Leukocytosis new  -Patient did not have any leukocytosis until today.  WBC is 13.6.  -Most likely could be related to urinary retention  -She is afebrile.  -Check urine analysis.  Check CBC in a.m.  -Check procalcitonin.      Diarrhea  -C. difficile negative.  Diarrhea resolved.  Electrolytes are normal.       Severe dementia with behavioral disturbances  -Continue PTA BuSpar, Remeron     Hypertension  -Holding home metoprolol and Cozaar due to borderline low blood pressure.     Hyperlipidemia  -Continue PTA statin           Medical Decision Making:   I personally reviewed labs: CBC and BMP  I personally reviewed imaging:  I personally reviewed EKG:   Toxic drug monitoring: Monitor hemoglobin while on Lovenox  Discussed case with: Pharmacy,  and nursing  Code Status: Full code   DVT Prophylaxis: Lovenox    Baseline: Lives in a memory care unit,   Subjective:     Chief Complaint / Reason for Physician Visit  She is alert, awake and confused  Has urinary retention requiring frequent straight 
      Hospitalist Progress Note    NAME:   Joana Colon   : 1945   MRN: [id number]     Date/Time: 2/10/2024 12:58 PM  Patient PCP: Gavi Meredith MD    Estimated discharge date:   Barriers: urine culture      Assessment / Plan    Acute UTI  Urinary retention  Constipation.  -continue ceftriaxone 1gm iv daily  -appreciate urology consult  -continue flomax  -PVR Bladder Scans q 6 hrs with intermittent straight catheterization for PVRs >300 ml   -will treat constipation with miralax once daily- will change to BID if doesn't have BM      Ground-level fall  Ambulatory dysfunction  -CT head shows no acute process.  X-ray of the right shoulder is within normal limits.  -Evaluated by PT OT and recommended SNF placement.  Pending SNF placement  -Vitamin B12 is 351.  TSH is normal.  -Patient will need comprehensive evaluation including vision and also hearing assessment to prevent further falls.  -She does not report any back pain        Diarrhea  -C. difficile negative.  Diarrhea resolved.  Electrolytes are normal.       Severe dementia with behavioral disturbances  -Continue PTA BuSpar, Remeron     Hypertension  -Holding home metoprolol and Cozaar due to borderline low blood pressure.     Hyperlipidemia  -Continue PTA statin           Medical Decision Making:   I personally reviewed labs: CBC and BMP  I personally reviewed imaging:  I personally reviewed EKG:   Toxic drug monitoring: Monitor hemoglobin while on Lovenox  Discussed case with: Pharmacy,  and nursing  Code Status: Full code   DVT Prophylaxis: Lovenox    Baseline: Lives in a memory care unit,   Subjective:     Chief Complaint / Reason for Physician Visit  She is alert, awake and confused  Reports she is not feeling well- however, not able to exactly tell what is wrong        Objective:     VITALS:   Last 24hrs VS reviewed since prior progress note. Most recent are:  Patient Vitals for the past 24 hrs:   BP Temp Temp src Pulse Resp 
      Hospitalist Progress Note    NAME:   Joana Colon   : 1945   MRN: [id number]     Date/Time: 2024 11:18 AM  Patient PCP: Gavi Meredith MD    Estimated discharge date:   Barriers: Stable for discharge    Assessment / Plan    Acute UTI  Urinary retention  Constipation.  -continue ceftriaxone 1gm iv daily  -appreciate urology consult  -continue flomax  -PVR Bladder Scans q 6 hrs with intermittent straight catheterization for PVRs >300 ml   -will treat constipation with miralax once daily- will change to BID if doesn't have BM  Urine culture final result:  >2 organisms - likely contaminated specimen.  Will stop IV antibiotics upon discharge        Ground-level fall  Ambulatory dysfunction  -CT head shows no acute process.  X-ray of the right shoulder is within normal limits.  -Evaluated by PT OT and recommended SNF placement.  Pending SNF placement  -Vitamin B12 is 351.  TSH is normal.  -Patient will need comprehensive evaluation including vision and also hearing assessment to prevent further falls.  -She does not report any back pain        Diarrhea  -C. difficile negative.  Diarrhea resolved.  Electrolytes are normal.       Severe dementia with behavioral disturbances  -Continue PTA BuSpar, Remeron     Hypertension  -Holding home metoprolol and Cozaar due to borderline low blood pressure.     Hyperlipidemia  -Continue PTA statin           Medical Decision Making:   I personally reviewed labs: CBC and BMP  I personally reviewed imaging:  I personally reviewed EKG:   Toxic drug monitoring: Monitor hemoglobin while on Lovenox  Discussed case with: Pharmacy,  and nursing  Code Status: Full code   DVT Prophylaxis: Lovenox    Baseline: Lives in a memory care unit,   Subjective:     Chief Complaint / Reason for Physician Visit  Patient seen and examined at the bedside, discussed with her plan of management and the culture results, no new issues        Objective:     VITALS:   Last 
  End of Shift Note     Bedside shift change report given to  RN (oncoming nurse) by Seferino ORTEGA (offgoing nurse).  Report included the following information Nurse Handoff Report and Neuro Assessment        Shift worked:  Night   Shift summary and any significant changes:     Bladder scanned patient Q4.  No acute changes over the night. Turned Q2, heelz up. Straight cath per protocol. Emptied  220mls.  Bladder scan 499mls. Patient irritable did straight cath Assisted by another nurse.      Concerns for physician to address: None   Zone phone for oncoming shift:   [phone]      Patient Information  Joana Colon  78 y.o.  2/5/2024  1:41 PM by Shante Ziegler MD. Joana Colon was admitted from Memory Care Unit at [facility]     Problem List          Patient Active Problem List     Diagnosis Date Noted    Alzheimer's dementia without behavioral disturbance, psychotic disturbance, mood disturbance, or anxiety ([facility]) 02/06/2024    Falls frequently 02/05/2024    Memory changes 01/05/2021    Anxiety and depression 01/05/2021    S/P CABG x 1 02/07/2017    GERD (gastroesophageal reflux disease) 11/07/2011    Hypertension 11/07/2011    Hyperlipidemia 11/07/2011    CAD (coronary artery disease) 11/07/2011      Past Medical History           Past Medical History:   Diagnosis Date    Back pain      CAD (coronary artery disease)       CABG, [place]    Dry eye      Hypercholesterolemia      Hypertension              Core Measures:  CVA: No No  CHF:No No  PNA:NoNo     Activity:  Activity: In bed  Number times ambulated in hallways past shift: 0  Number of times OOB to chair past shift: 1     Cardiac:   Cardiac Monitoring: No         Access:   Current line(s): PIV     Genitourinary:   Urinary status: incontinent        Respiratory:   O2 Device: None (Room air)  Chronic home O2 use?: N/A  Incentive spirometer at bedside: N/A     GI:  Last BM (including prior to admit): 02/06/24  Current diet:  ADULT DIET; Easy to Chew  Passing flatus: 
  End of Shift Note     Bedside shift change report given to SHANNON Guo (oncoming nurse) by Gary Carrion RN (offgoing nurse).  Report included the following information Nurse Handoff Report and Neuro Assessment        Shift worked:  Days   Shift summary and any significant changes:     Bladder scanned patient Q4.  Patient up to bathroom multiple times throughout day. Turned Q2, heelz up. Straight cath per protocol.      Concerns for physician to address: Urinary retention    Zone phone for oncoming shift:   [phone]      Patient Information  Joana Colon  78 y.o.  2/5/2024  1:41 PM by Shante Ziegler MD. Joana Colon was admitted from Memory Care Unit at [facility]     Problem List          Patient Active Problem List     Diagnosis Date Noted    Alzheimer's dementia without behavioral disturbance, psychotic disturbance, mood disturbance, or anxiety ([facility]) 02/06/2024    Falls frequently 02/05/2024    Memory changes 01/05/2021    Anxiety and depression 01/05/2021    S/P CABG x 1 02/07/2017    GERD (gastroesophageal reflux disease) 11/07/2011    Hypertension 11/07/2011    Hyperlipidemia 11/07/2011    CAD (coronary artery disease) 11/07/2011      Past Medical History           Past Medical History:   Diagnosis Date    Back pain      CAD (coronary artery disease)       CABG, [place]    Dry eye      Hypercholesterolemia      Hypertension              Core Measures:  CVA: No No  CHF:No No  PNA:NoNo     Activity:  Activity: In bed  Number times ambulated in hallways past shift: 0  Number of times OOB to chair past shift: 1     Cardiac:   Cardiac Monitoring: No         Access:   Current line(s): PIV     Genitourinary:   Urinary status: incontinent        Respiratory:   O2 Device: None (Room air)  Chronic home O2 use?: N/A  Incentive spirometer at bedside: N/A     GI:  Last BM (including prior to admit): 02/06/24  Current diet:  ADULT DIET; Easy to Chew  Passing flatus: yes  Tolerating current diet: yes     Pain 
  End of Shift Note     Bedside shift change report given to Zeke ORTEGA (oncoming nurse) by Gary Carrion RN (offgoing nurse).  Report included the following information Nurse Handoff Report and Neuro Assessment        Shift worked:  Days   Shift summary and any significant changes:     Bladder scanned patient Q4.  Patient up to chair throughout day. Waffle pad in place. Turned Q2, heelz up. Straight cath per protocol.      Concerns for physician to address: None   Zone phone for oncoming shift:   [phone]      Patient Information  Joana Colon  78 y.o.  2/5/2024  1:41 PM by Shante Ziegler MD. Joana Colon was admitted from Memory Care Unit at [facility]     Problem List          Patient Active Problem List     Diagnosis Date Noted    Alzheimer's dementia without behavioral disturbance, psychotic disturbance, mood disturbance, or anxiety ([facility]) 02/06/2024    Falls frequently 02/05/2024    Memory changes 01/05/2021    Anxiety and depression 01/05/2021    S/P CABG x 1 02/07/2017    GERD (gastroesophageal reflux disease) 11/07/2011    Hypertension 11/07/2011    Hyperlipidemia 11/07/2011    CAD (coronary artery disease) 11/07/2011      Past Medical History           Past Medical History:   Diagnosis Date    Back pain      CAD (coronary artery disease)       CABG, [place]    Dry eye      Hypercholesterolemia      Hypertension              Core Measures:  CVA: No No  CHF:No No  PNA:NoNo     Activity:  Activity: In bed  Number times ambulated in hallways past shift: 0  Number of times OOB to chair past shift: 1     Cardiac:   Cardiac Monitoring: No         Access:   Current line(s): PIV     Genitourinary:   Urinary status: incontinent        Respiratory:   O2 Device: None (Room air)  Chronic home O2 use?: N/A  Incentive spirometer at bedside: N/A     GI:  Last BM (including prior to admit): 02/06/24  Current diet:  ADULT DIET; Easy to Chew  Passing flatus: yes  Tolerating current diet: yes     Pain Management:   Patient 
  Physician Progress Note      PATIENT:               PAULA OJEDA  CSN #:                  [id number]  :                       1945  ADMIT DATE:       2024 1:41 PM  DISCH DATE:  RESPONDING  PROVIDER #:        Tabby Zee MD          QUERY TEXT:    Dr Zee  Pt admitted with ground level fall, ambulatory dysfunction. Pt noted to have   urinary retention, UTI with 2/5 UA negative and 2/9 UA +. If possible, please   document in progress notes and discharge summary the present on admission   status of UTI:    The medical record reflects the following:  Risk Factors: Dementia  Clinical Indicators: WBC 5-5-13, urinary retention-cont to need straight cath;   UA 2/: negative, repeat UA : large blood; Large leukocyte; turbid   appearance, granular case 2-5, moderate epithelial cells; bacteria 4+,   wbc>`100  Treatment: Urology consult, ivf@75cc/hr, Rocephin  Options provided:  -- Yes, UTI was present at the time of the order to admit to the hospital  -- No, UTI was not present on admission and developed during the inpatient   stay  -- Other - I will add my own diagnosis  -- Disagree - Not applicable / Not valid  -- Disagree - Clinically unable to determine / Unknown  -- Refer to Clinical Documentation Reviewer    PROVIDER RESPONSE TEXT:    No, UTI was not present on admission and developed during the inpatient stay.    Query created by: Ruth Bonilla on 2024 11:54 AM      Electronically signed by:  Tabby Zee MD 2/10/2024 8:33 AM          
Bedside shift change report given to SHANNON Solis (oncoming nurse) by SHANNON Chicas (offgoing nurse). Report included the following information Nurse Handoff Report, Index, ED Encounter Summary, ED SBAR, Adult Overview, Intake/Output, MAR, Recent Results, Cardiac Rhythm NSR, Quality Measures, and Neuro Assessment.  Patient arrived to the unit oriented to self and place, was able to identify that she was in a hospital. Calm and cooperative during orientation to her assigned room. Peripheral IV access intact and patent. Patient awake, alert and resting in the room. Bed alarm activated and call bell within the reach of patient. Night shift RN to continue with patient care.  
End of Shift Note     Bedside shift change report given to Gary RN (oncoming nurse) by SHANNON Gentile (offgoing nurse).  Report included the following information Nurse Handoff Report and Neuro Assessment        Shift worked:  Night   Shift summary and any significant changes:     No acute changes over the night. Bladder scan done Q6 over the night.  Patient had anxiety and agitation over the night.  Was able to communicate with louis Bejarano via phone.  Haldor given x 1 over the night.  Vitals remained within normal range.  Q2 turn done. Straight cath done  per protocol. Patient took all her medicine.      Concerns for physician to address: Urinary retention.   Zone phone for oncoming shift:   [phone]      Patient Information  Joana Colon  78 y.o.  2/5/2024  1:41 PM by Shante Ziegler MD. Joana Colon was admitted from Memory Care Unit at [facility]     Problem List          Patient Active Problem List     Diagnosis Date Noted    Alzheimer's dementia without behavioral disturbance, psychotic disturbance, mood disturbance, or anxiety ([facility]) 02/06/2024    Falls frequently 02/05/2024    Memory changes 01/05/2021    Anxiety and depression 01/05/2021    S/P CABG x 1 02/07/2017    GERD (gastroesophageal reflux disease) 11/07/2011    Hypertension 11/07/2011    Hyperlipidemia 11/07/2011    CAD (coronary artery disease) 11/07/2011      Past Medical History           Past Medical History:   Diagnosis Date    Back pain      CAD (coronary artery disease)       CABG, [place]    Dry eye      Hypercholesterolemia      Hypertension              Core Measures:  CVA: No No  CHF:No No  PNA:NoNo     Activity:  Activity: In bed  Number times ambulated in hallways past shift: 0  Number of times OOB to chair past shift: 1     Cardiac:   Cardiac Monitoring: No         Access:   Current line(s): PIV     Genitourinary:   Urinary status: incontinent        Respiratory:   O2 Device: None (Room air)  Chronic home O2 use?: N/A  Incentive 
End of Shift Note     Bedside shift change report given to Gary RN (oncoming nurse) by SHANNON Gentile (offgoing nurse).  Report included the following information Nurse Handoff Report and Neuro Assessment        Shift worked:  Night   Shift summary and any significant changes:     No acute changes over the night. Bladder scan done Q6 over the night.  Patient had anxiety and agitation over the night.  Was able to communicate with louis Bejarano via phone.  Haldor given x 1 over the night.  Vitals remained within normal range.Bladder scan done at 0700; 305  Q2 turn done. Straight cath done  per protocol. Patient took all her medicine.      Concerns for physician to address: Urinary retention.   Zone phone for oncoming shift:   [phone]      Patient Information  Joana Colon  78 y.o.  2/5/2024  1:41 PM by Shante Ziegler MD. 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    GERD (gastroesophageal reflux disease) 11/07/2011    Hypertension 11/07/2011    Hyperlipidemia 11/07/2011    CAD (coronary artery disease) 11/07/2011      Past Medical History           Past Medical History:   Diagnosis Date    Back pain      CAD (coronary artery disease)       CABG, [place]    Dry eye      Hypercholesterolemia      Hypertension              Core Measures:  CVA: No No  CHF:No No  PNA:NoNo     Activity:  Activity: In bed  Number times ambulated in hallways past shift: 0  Number of times OOB to chair past shift: 1     Cardiac:   Cardiac Monitoring: No         Access:   Current line(s): PIV     Genitourinary:   Urinary status: incontinent        Respiratory:   O2 Device: None (Room air)  Chronic 
End of Shift Note     Bedside shift change report given to SHANNON Guo (oncoming nurse) by Gary Carrion RN (offgoing nurse).  Report included the following information Nurse Handoff Report and Neuro Assessment        Shift worked:  Days   Shift summary and any significant changes:     Bladder scanned patient Q4  Patient up to bathroom and chair multiple times throughout day. Family visited, turned Q2, heelz up.     Concerns for physician to address: Urinary retention    Zone phone for oncoming shift:   [phone]      Patient Information  Joana Colon  78 y.o.  2/5/2024  1:41 PM by Shante Ziegler MD. Joana Colon was admitted from Memory Care Unit at [facility]     Problem List       Patient Active Problem List     Diagnosis Date Noted    Alzheimer's dementia without behavioral disturbance, psychotic disturbance, mood disturbance, or anxiety ([facility]) 02/06/2024    Falls frequently 02/05/2024    Memory changes 01/05/2021    Anxiety and depression 01/05/2021    S/P CABG x 1 02/07/2017    GERD (gastroesophageal reflux disease) 11/07/2011    Hypertension 11/07/2011    Hyperlipidemia 11/07/2011    CAD (coronary artery disease) 11/07/2011      Past Medical History        Past Medical History:   Diagnosis Date    Back pain      CAD (coronary artery disease)       CABG, [place]    Dry eye      Hypercholesterolemia      Hypertension              Core Measures:  CVA: No No  CHF:No No  PNA:NoNo     Activity:  Activity: In bed  Number times ambulated in hallways past shift: 0  Number of times OOB to chair past shift: 1     Cardiac:   Cardiac Monitoring: No         Access:   Current line(s): PIV     Genitourinary:   Urinary status: incontinent        Respiratory:   O2 Device: None (Room air)  Chronic home O2 use?: N/A  Incentive spirometer at bedside: N/A     GI:  Last BM (including prior to admit): 02/06/24  Current diet:  ADULT DIET; Easy to Chew  Passing flatus: yes  Tolerating current diet: yes     Pain Management:   Patient 
End of Shift Note     Bedside shift change report given to Zeke ORTEGA (oncoming nurse) by Gary Carrion RN (offgoing nurse).  Report included the following information Nurse Handoff Report and Neuro Assessment        Shift worked:  Days   Shift summary and any significant changes:     Bladder scanned patient Q4.  Patient up to chair throughout day. Waffle pad in place. Turned Q2, heelz up. Straight cath per protocol.      Concerns for physician to address: Urinary retention, Scheduled laxative? Patient has not had BM in a few days   Zone phone for oncoming shift:   [phone]      Patient Information  Joana Colon  78 y.o.  2/5/2024  1:41 PM by Shante Ziegler MD. Joana Colon was admitted from Memory Care Unit at [facility]     Problem List          Patient Active Problem List     Diagnosis Date Noted    Alzheimer's dementia without behavioral disturbance, psychotic disturbance, mood disturbance, or anxiety ([facility]) 02/06/2024    Falls frequently 02/05/2024    Memory changes 01/05/2021    Anxiety and depression 01/05/2021    S/P CABG x 1 02/07/2017    GERD (gastroesophageal reflux disease) 11/07/2011    Hypertension 11/07/2011    Hyperlipidemia 11/07/2011    CAD (coronary artery disease) 11/07/2011      Past Medical History           Past Medical History:   Diagnosis Date    Back pain      CAD (coronary artery disease)       CABG, [place]    Dry eye      Hypercholesterolemia      Hypertension              Core Measures:  CVA: No No  CHF:No No  PNA:NoNo     Activity:  Activity: In bed  Number times ambulated in hallways past shift: 0  Number of times OOB to chair past shift: 1     Cardiac:   Cardiac Monitoring: No         Access:   Current line(s): PIV     Genitourinary:   Urinary status: incontinent        Respiratory:   O2 Device: None (Room air)  Chronic home O2 use?: N/A  Incentive spirometer at bedside: N/A     GI:  Last BM (including prior to admit): 02/06/24  Current diet:  ADULT DIET; Easy to Chew  Passing 
End of Shift Note    Bedside shift change report given to Gary RN  (oncoming nurse) by Doug Gonzalez RN (offgoing nurse).  Report included the following information Nurse Handoff Report and Neuro Assessment      Shift worked:  7p - 7a   Shift summary and any significant changes:    Patient is oriented to self and confused. Rested well between care. Bladder scanned and straight cath as per Urology recs. Patient denies any pain. Rollbelt utilized.     Concerns for physician to address:  Discharge planning   Zone phone for oncoming shift:   [phone]     Patient Information  Joana Colon  78 y.o.  2/5/2024  1:41 PM by Shante Ziegler MD. Joana Colon was admitted from Memory Care Unit at [facility]    Problem List  Patient Active Problem List    Diagnosis Date Noted    Alzheimer's dementia without behavioral disturbance, psychotic disturbance, mood disturbance, or anxiety ([facility]) 02/06/2024    Falls frequently 02/05/2024    Memory changes 01/05/2021    Anxiety and depression 01/05/2021    S/P CABG x 1 02/07/2017    GERD (gastroesophageal reflux disease) 11/07/2011    Hypertension 11/07/2011    Hyperlipidemia 11/07/2011    CAD (coronary artery disease) 11/07/2011     Past Medical History:   Diagnosis Date    Back pain     CAD (coronary artery disease)     CABG, [place]    Dry eye     Hypercholesterolemia     Hypertension      Core Measures:  CVA: No No  CHF:No No  PNA:NoNo    Activity:  Activity: In bed  Number times ambulated in hallways past shift: 0  Number of times OOB to chair past shift: 0    Cardiac:   Cardiac Monitoring: No        Access:   Current line(s): PIV    Genitourinary:   Urinary status: incontinent/Retention    Respiratory:   O2 Device: None (Room air)  Chronic home O2 use?: N/A  Incentive spirometer at bedside: N/A     GI:  Last BM (including prior to admit): 02/06/24  Current diet:  ADULT DIET; Easy to Chew  Passing flatus: yes  Tolerating current diet: yes       Pain Management: 
End of Shift Note    Bedside shift change report given to SHANNON Baker  (oncoming nurse) by Doug Gonzalez RN (offgoing nurse).  Report included the following information Nurse Handoff Report and Neuro Assessment      Shift worked:  7p - 7a   Shift summary and any significant changes:    Patient remains confused and only oriented to self. Rested well between care. Continues retaining, bladder scanned and straight cath as per protocol. Patient denies any pain. Rollbelt utilized.     Concerns for physician to address:  Urinary retention   Zone phone for oncoming shift:   [phone]     Patient Information  Joana Colon  78 y.o.  2/5/2024  1:41 PM by Shante Ziegler MD. Joana Colon was admitted from Memory Care Unit at [facility]    Problem List  Patient Active Problem List    Diagnosis Date Noted    Alzheimer's dementia without behavioral disturbance, psychotic disturbance, mood disturbance, or anxiety ([facility]) 02/06/2024    Falls frequently 02/05/2024    Memory changes 01/05/2021    Anxiety and depression 01/05/2021    S/P CABG x 1 02/07/2017    GERD (gastroesophageal reflux disease) 11/07/2011    Hypertension 11/07/2011    Hyperlipidemia 11/07/2011    CAD (coronary artery disease) 11/07/2011     Past Medical History:   Diagnosis Date    Back pain     CAD (coronary artery disease)     CABG, [place]    Dry eye     Hypercholesterolemia     Hypertension      Core Measures:  CVA: No No  CHF:No No  PNA:NoNo    Activity:  Activity: In bed  Number times ambulated in hallways past shift: 0  Number of times OOB to chair past shift: 0    Cardiac:   Cardiac Monitoring: No          Access:   Current line(s): PIV    Genitourinary:   Urinary status: incontinent      Respiratory:   O2 Device: None (Room air)  Chronic home O2 use?: N/A  Incentive spirometer at bedside: N/A       GI:  Last BM (including prior to admit): 02/06/24 (miralax given)  Current diet:  ADULT DIET; Easy to Chew  Passing flatus: yes  Tolerating current 
End of Shift Note    Bedside shift change report given to SHANNON Duran  (oncoming nurse) by Kathleen Kelly RN (offgoing nurse).  Report included the following information Nurse Handoff Report and Neuro Assessment      Shift worked:  nights   Shift summary and any significant changes:     Remains confused, requires PRN medication, did not sleep at all last night, needs frequent redirection, continues to have urinary retention despite multiple incontinent episodes and straight cath.  Bladder scan yielded approximately 999 ml's of urine retained in bladder.  950mls of urine obtained from straight cath. Utilizing rollbelt, can demonstrate dexterity to release rollbelt. Otherwise, no significant events.     Concerns for physician to address:  Continue straight cath protocol or reinsert maya for chronic urinary retention?  Repeat UA to assess for UTI?   Zone phone for oncoming shift:        Patient Information  Joana Colon  78 y.o.  2/5/2024  1:41 PM by Shante Ziegler MD. Joana Colon was admitted from Memory Care Unit at [facility]    Problem List  Patient Active Problem List    Diagnosis Date Noted    Alzheimer's dementia without behavioral disturbance, psychotic disturbance, mood disturbance, or anxiety ([facility]) 02/06/2024    Falls frequently 02/05/2024    Memory changes 01/05/2021    Anxiety and depression 01/05/2021    S/P CABG x 1 02/07/2017    GERD (gastroesophageal reflux disease) 11/07/2011    Hypertension 11/07/2011    Hyperlipidemia 11/07/2011    CAD (coronary artery disease) 11/07/2011     Past Medical History:   Diagnosis Date    Back pain     CAD (coronary artery disease)     CABG, [place]    Dry eye     Hypercholesterolemia     Hypertension        Core Measures:  CVA: No No  CHF:No No  PNA:NoNo    Activity:  Activity: In bed  Number times ambulated in hallways past shift: 0  Number of times OOB to chair past shift: 0    Cardiac:   Cardiac Monitoring: Yes      Cardiac Rhythm: Sinus rhythm, Sinus 
End of Shift Note    Bedside shift change report given to SHANNON Guo  (oncoming nurse) by Olivia Lindsay RN (offgoing nurse).  Report included the following information Nurse Handoff Report and Neuro Assessment      Shift worked: DAYS   Shift summary and any significant changes:    Patient remains confused and only oriented to self, Patient very combative and uncooperative for the first part of the day.  After about 2pm while sister was at bedside patient became more cooperative and let nurse draw blood work and do a straight cath.  This morning IM haldol was needed X 1. Patient started to get more confused and PO agitation med given X 1.      Concerns for physician to address:  Urinary retention/Discharge plan   Zone phone for oncoming shift:   [phone]     Patient Information  Joana Colon  78 y.o.  2/5/2024  1:41 PM by Shante Ziegler MD. Joana Colon was admitted from Memory Care Unit at [facility]    Problem List  Patient Active Problem List    Diagnosis Date Noted    Alzheimer's dementia without behavioral disturbance, psychotic disturbance, mood disturbance, or anxiety ([facility]) 02/06/2024    Falls frequently 02/05/2024    Memory changes 01/05/2021    Anxiety and depression 01/05/2021    S/P CABG x 1 02/07/2017    GERD (gastroesophageal reflux disease) 11/07/2011    Hypertension 11/07/2011    Hyperlipidemia 11/07/2011    CAD (coronary artery disease) 11/07/2011     Past Medical History:   Diagnosis Date    Back pain     CAD (coronary artery disease)     CABG, [place]    Dry eye     Hypercholesterolemia     Hypertension      Core Measures:  CVA: No No  CHF:No No  PNA:NoNo    Activity:  Activity: In bed  Number times ambulated in hallways past shift: 0  Number of times OOB to chair past shift: 0    Cardiac:   Cardiac Monitoring: No          Access:   Current line(s): PIV    Genitourinary:   Urinary status: incontinent      Respiratory:   O2 Device: None (Room air)  Chronic home O2 use?: N/A  Incentive spirometer at 
End of Shift Note    Bedside shift change report given to SHANNON Hernandes  (oncoming nurse) by Kathleen Kelly RN (offgoing nurse).  Report included the following information Nurse Handoff Report and Neuro Assessment      Shift worked:  nights   Shift summary and any significant changes:     Remains confused, requires PRN medication.  UA sent UTI +. Utilizing rollbelt, can demonstrate dexterity to release rollbelt. Otherwise, no significant events.     Concerns for physician to address:  Needs abx for UTI, urology consult for chronic urinary retention?   Zone phone for oncoming shift:        Patient Information  Joana Colon  78 y.o.  2/5/2024  1:41 PM by Shante Ziegler MD. Joana Colon was admitted from Memory Care Unit at [facility]    Problem List  Patient Active Problem List    Diagnosis Date Noted    Alzheimer's dementia without behavioral disturbance, psychotic disturbance, mood disturbance, or anxiety ([facility]) 02/06/2024    Falls frequently 02/05/2024    Memory changes 01/05/2021    Anxiety and depression 01/05/2021    S/P CABG x 1 02/07/2017    GERD (gastroesophageal reflux disease) 11/07/2011    Hypertension 11/07/2011    Hyperlipidemia 11/07/2011    CAD (coronary artery disease) 11/07/2011     Past Medical History:   Diagnosis Date    Back pain     CAD (coronary artery disease)     CABG, [place]    Dry eye     Hypercholesterolemia     Hypertension        Core Measures:  CVA: No No  CHF:No No  PNA:NoNo    Activity:  Activity: In bed  Number times ambulated in hallways past shift: 0  Number of times OOB to chair past shift: 0    Cardiac:   Cardiac Monitoring: Yes      Cardiac Rhythm: Sinus rhythm    Access:   Current line(s): PIV    Genitourinary:   Urinary status: incontinent      Respiratory:   O2 Device: None (Room air)  Chronic home O2 use?: N/A  Incentive spirometer at bedside: N/A       GI:  Last BM (including prior to admit): 02/06/24  Current diet:  ADULT DIET; Easy to Chew  Passing flatus: 
End of Shift Note    Bedside shift change report given to SHANNON Myles (oncoming nurse) by Renee Flores RN (offgoing nurse).  Report included the following information Nurse Handoff Report and Neuro Assessment      Shift worked:  Days   Shift summary and any significant changes:    Bladder scanned patient Q4  Straight cath for 500mL output x1  PT/OT came to bedside   Patient up to chair throughout shift   Flomax added to daily meds    Concerns for physician to address: Urinary retention    Zone phone for oncoming shift:   [phone]     Patient Information  Joana Colon  78 y.o.  2/5/2024  1:41 PM by Shante Ziegler MD. Joana Colon was admitted from Memory Care Unit at [facility]    Problem List  Patient Active Problem List    Diagnosis Date Noted    Alzheimer's dementia without behavioral disturbance, psychotic disturbance, mood disturbance, or anxiety ([facility]) 02/06/2024    Falls frequently 02/05/2024    Memory changes 01/05/2021    Anxiety and depression 01/05/2021    S/P CABG x 1 02/07/2017    GERD (gastroesophageal reflux disease) 11/07/2011    Hypertension 11/07/2011    Hyperlipidemia 11/07/2011    CAD (coronary artery disease) 11/07/2011     Past Medical History:   Diagnosis Date    Back pain     CAD (coronary artery disease)     CABG, [place]    Dry eye     Hypercholesterolemia     Hypertension        Core Measures:  CVA: No No  CHF:No No  PNA:NoNo    Activity:  Activity: In bed  Number times ambulated in hallways past shift: 0  Number of times OOB to chair past shift: 1    Cardiac:   Cardiac Monitoring: No        Access:   Current line(s): PIV    Genitourinary:   Urinary status: incontinent      Respiratory:   O2 Device: None (Room air)  Chronic home O2 use?: N/A  Incentive spirometer at bedside: N/A       GI:  Last BM (including prior to admit): 02/06/24  Current diet:  ADULT DIET; Easy to Chew  Passing flatus: yes  Tolerating current diet: yes       Pain Management:   Patient states pain is manageable on 
End of Shift Note    Bedside shift change report given to SHANNON Potter  (oncoming nurse) by Kathleen Kelly RN (offgoing nurse).  Report included the following information Nurse Handoff Report and Neuro Assessment      Shift worked:  nights   Shift summary and any significant changes:     Transfer from surgical tele unit for frequent falls. Confused.  Oriented to self only, anxious, agitated, impulsive.  PRN medications do help with behavioral disturbances.  Monitoring equipment at bedside for safety. Luevano cath pulled. Monitoring for retention. No acute events     Concerns for physician to address:  none   Zone phone for oncoming shift:        Patient Information  Joana Colon  78 y.o.  2/5/2024  1:41 PM by Shante Ziegler MD. Joana Colon was admitted from Memory Care Unit at [facility]    Problem List  Patient Active Problem List    Diagnosis Date Noted    Alzheimer's dementia without behavioral disturbance, psychotic disturbance, mood disturbance, or anxiety ([facility]) 02/06/2024    Falls frequently 02/05/2024    Memory changes 01/05/2021    Anxiety and depression 01/05/2021    S/P CABG x 1 02/07/2017    GERD (gastroesophageal reflux disease) 11/07/2011    Hypertension 11/07/2011    Hyperlipidemia 11/07/2011    CAD (coronary artery disease) 11/07/2011     Past Medical History:   Diagnosis Date    Back pain     CAD (coronary artery disease)     CABG, [place]    Dry eye     Hypercholesterolemia     Hypertension        Core Measures:  CVA: No No  CHF:No No  PNA:NoNo    Activity:  Activity: In bed  Number times ambulated in hallways past shift: 0  Number of times OOB to chair past shift: 0    Cardiac:   Cardiac Monitoring: Yes      Cardiac Rhythm: Sinus rhythm    Access:   Current line(s): PIV    Genitourinary:   Urinary status: incontinent      Respiratory:   O2 Device: None (Room air)  Chronic home O2 use?: N/A  Incentive spirometer at bedside: N/A       GI:  Last BM (including prior to admit): 02/06/24  Current diet:  
New to [facility].    Admit GL Fall, \"not herself\" h/o dementia/confusion  Admission UA 2/5 benign, curiously 2/9 UA > 100 WBC - ? Contaminate?  Reference to urinary retention, detail unknown. Already pulling out IV's etc. Palliative care. Creat 0.67, WBC 9.9.    Plan:    ? Retention - Clarify \"retention\", but will try to be conservative and avoid indwelling maya given above. If significant residual I&O cath is better options, and allows PVR determinations over time.  ? UTI - on Rocephin    Will seen in formal consultation, I will check in again Monday, service avai;able over weekend via call.      
Palliative Medicine SW Note    [facility] called assigned nurse, Gary to inquire if family had brought in AMD. She reported that several family members were here today, but have all left and no one turned in any paperwork today. Family said they would return Monday. Requested to be notified when family is here so Palliative team can meet them. We are available by phone or Perfect[employer].    Thank you for including Palliative team in the care of Ms. Colon.    Isa Christensen, [facility]  Palliative Medicine [phone])  
Palliative Medicine SW Note  LCSW called daughter-in-law, Ellen. Left vm offering support, requesting return call and a copy of AMD for chart.    Thank you for including Palliative team in the care of Ms. Joana Colon.    Isa Christensen, [facility]  Palliative Medicine [phone])  
Palliative Medicine VINOD Note  [facility] and she was sitting up in a chair eating chocolate ice cream, appeared comfortable, in no apparent distress. She said she did not like her food, except for the ice cream. This writer placed phone where she could reach it and told her this writer would be calling her family. She did not have anything she wanted me to convey.    Her daughter-in-law is home with the flu and is working with her , patient's son Js, to provide AMD by email to this writer to be placed in the chart. They are considering signing a DDNR, so we will follow up with them on code status discussion as appropriate and can facilitate signing a DDNR via docusign.    Thank you for including Palliative team in the care of Ms. Joana Colon.    Isa Christensen LCSW  Palliative Medicine [phone])  
RN notified Palliative of daughter faxing over AMD. AMD was placed in patient's file and Palliative aware.  
Report called to Brett Davies, Patient's nurse will be Luke Romero LPN.  Report given in SBAR format with opportunity given to ask questions and all questions answered.  Delta to  at 1pm.  
Spiritual Care Partner Volunteer visited patient at [facility] in [facility] 1 NEUROSCIENCE TELEMETRY on 2/13/2024   Documented by:    JACKY Galeas  [facility]   Paging Service [phone])  
Ucx pending - no abx, suspect contaminate over active infection given (-) 2/5 UA  PVR over weekend 200 cs or less.  Will sign off.    
02/06/24  Current diet:  ADULT DIET; Easy to Chew  Passing flatus: yes  Tolerating current diet: yes       Pain Management:   Patient states pain is manageable on current regimen: N/A    Skin:  Jonathon Scale Score: 16  Interventions: float heels, increase time out of bed, and internal/external urinary devices    Patient Safety:  Fall Score: Wild Total Score: 100  Interventions: bed/chair alarm, assistive devices (walker, cane, etc.), gripper socks, pt to call before getting OOB, and stay with me (per policy)     @Rollbelt  @dexterity to release roll belt  Yes/No ( must document dexterity  here by stating Yes or No here, otherwise this is a restraint and must follow restraint documentation policy.)    DVT prophylaxis:  DVT prophylaxis Med- yes  DVT prophylaxis SCD or JACOB- N/A       Wounds: (If Applicable)  Wounds- no  Location nonblanchable erythema to intergluteal fold, boggy heels    Active Consults:  IP CONSULT TO PALLIATIVE CARE    Length of Stay:  Expected LOS: 3  Actual LOS: 2  Discharge Plan: Back to memory care unit      Abbey Hartman RN                              
100 % -- --   02/05/24 1530 118/60 -- -- 72 15 99 % -- --   02/05/24 1508 114/66 -- -- -- -- 99 % -- --   02/05/24 1404 115/64 -- -- 73 16 -- -- --   02/05/24 1356 133/64 98.4 °F (36.9 °C) Oral 80 16 99 % 1.651 m (5' 5\") 63.5 kg (140 lb)         Intake/Output Summary (Last 24 hours) at 2/6/2024 1330  Last data filed at 2/6/2024 1259  Gross per 24 hour   Intake 500 ml   Output 1400 ml   Net -900 ml        I had a face to face encounter and independently examined this patient on 2/6/2024, as outlined below:  PHYSICAL EXAM:  General: Alert, cooperative  EENT:  EOMI. Anicteric sclerae.  Resp:  CTA bilaterally, no wheezing or rales.  No accessory muscle use  CV:  Regular  rhythm,  No edema  GI:  Soft, Non distended, Non tender.  +Bowel sounds  Neurologic:  Alert and oriented X 3, normal speech,   Psych:   Good insight. Not anxious nor agitated  Skin:  No rashes.  No jaundice    Reviewed most current lab test results and cultures  YES  Reviewed most current radiology test results   YES  Review and summation of old records today    NO  Reviewed patient's current orders and MAR    YES  PMH/SH reviewed - no change compared to H&P    Procedures: see electronic medical records for all procedures/Xrays and details which were not copied into this note but were reviewed prior to creation of Plan.      LABS:  I reviewed today's most current labs and imaging studies.  Pertinent labs include:  Recent Labs     02/05/24  1546 02/06/24  0341   WBC 5.5 5.1   HGB 11.9 11.0*   HCT 37.1 34.2*    241     Recent Labs     02/05/24  1546 02/06/24  0341   * 144   K 3.3* 3.4*   * 115*   CO2 30 26   GLUCOSE 131* 86   BUN 24* 15   CREATININE 0.82 0.58   CALCIUM 8.6 8.1*   MG 1.9 1.8   LABALBU 2.8*  --    BILITOT 0.2  --    AST 23  --    ALT 26  --        Signed: Shante Ziegler MD

## 2024-02-14 NOTE — PLAN OF CARE
Problem: Confusion  Goal: Confusion, delirium, dementia, or psychosis is improved or at baseline  Description: INTERVENTIONS:  1. Assess for possible contributors to thought disturbance, including medications, impaired vision or hearing, underlying metabolic abnormalities, dehydration, psychiatric diagnoses, and notify attending LIP  2. [place] high risk fall precautions, as indicated  3. Provide frequent short contacts to provide reality reorientation, refocusing and direction  4. Decrease environmental stimuli, including noise as appropriate  5. Monitor and intervene to maintain adequate nutrition, hydration, elimination, sleep and activity  6. If unable to ensure safety without constant attention obtain sitter and review sitter guidelines with assigned personnel  7. Initiate Psychosocial CNS and Spiritual Care consult, as indicated  2/11/2024 0943 by Olivia Lindsay RN  Outcome: Not Progressing  2/10/2024 2208 by Doug Gonzalez, SHANNON  Flowsheets  Taken 2/10/2024 2208  Effect of thought disturbance (confusion, delirium, dementia, or psychosis) are managed with adequate functional status:   Assess for contributors to thought disturbance, including medications, impaired vision or hearing, underlying metabolic abnormalities, dehydration, psychiatric diagnoses, notify LIP   [place] high risk fall precautions, as indicated   Provide frequent short contacts to provide reality reorientation, refocusing and direction   Decrease environmental stimuli, including noise as appropriate   Monitor and intervene to maintain adequate nutrition, hydration, elimination, sleep and activity  Taken 2/10/2024 1942  Effect of thought disturbance (confusion, delirium, dementia, or psychosis) are managed with adequate functional status:   Assess for contributors to thought disturbance, including medications, impaired vision or hearing, underlying metabolic abnormalities, dehydration, psychiatric diagnoses, notify LIP   Decrease 
  Problem: Confusion  Goal: Confusion, delirium, dementia, or psychosis is improved or at baseline  Description: INTERVENTIONS:  1. Assess for possible contributors to thought disturbance, including medications, impaired vision or hearing, underlying metabolic abnormalities, dehydration, psychiatric diagnoses, and notify attending LIP  2. [place] high risk fall precautions, as indicated  3. Provide frequent short contacts to provide reality reorientation, refocusing and direction  4. Decrease environmental stimuli, including noise as appropriate  5. Monitor and intervene to maintain adequate nutrition, hydration, elimination, sleep and activity  6. If unable to ensure safety without constant attention obtain sitter and review sitter guidelines with assigned personnel  7. Initiate Psychosocial CNS and Spiritual Care consult, as indicated  2/10/2024 2208 by Doug Gonzalez RN  Flowsheets (Taken 2/10/2024 2208)  Effect of thought disturbance (confusion, delirium, dementia, or psychosis) are managed with adequate functional status:   Assess for contributors to thought disturbance, including medications, impaired vision or hearing, underlying metabolic abnormalities, dehydration, psychiatric diagnoses, notify LIP   [place] high risk fall precautions, as indicated   Provide frequent short contacts to provide reality reorientation, refocusing and direction   Decrease environmental stimuli, including noise as appropriate   Monitor and intervene to maintain adequate nutrition, hydration, elimination, sleep and activity  2/10/2024 1131 by Gary Carrion, RN  Outcome: Progressing     Problem: Neurosensory - Adult  Goal: Achieves stable or improved neurological status  2/10/2024 1131 by Gary Carrion, RN  Outcome: Progressing     Problem: Neurosensory - Adult  Goal: Achieves maximal functionality and self care  2/10/2024 1131 by Gary Carrion, RN  Outcome: Progressing     Problem: Skin/Tissue 
  Problem: Discharge Planning  Goal: Discharge to home or other facility with appropriate resources  2/12/2024 2247 by Franky Price RN  Outcome: Progressing  2/12/2024 0914 by Gary Carrion RN  Outcome: Progressing     Problem: Pain  Goal: Verbalizes/displays adequate comfort level or baseline comfort level  2/12/2024 2247 by Franky Price RN  Outcome: Progressing  2/12/2024 0914 by Gary Carrion RN  Outcome: Progressing     Problem: Skin/Tissue Integrity  Goal: Absence of new skin breakdown  Description: 1.  Monitor for areas of redness and/or skin breakdown  2.  Assess vascular access sites hourly  3.  Every 4-6 hours minimum:  Change oxygen saturation probe site  4.  Every 4-6 hours:  If on nasal continuous positive airway pressure, respiratory therapy assess nares and determine need for appliance change or resting period.  2/12/2024 2247 by Franky Price RN  Outcome: Progressing  2/12/2024 0914 by Gary Carrion RN  Outcome: Progressing     Problem: Safety - Adult  Goal: Free from fall injury  2/12/2024 2247 by Franky Price RN  Outcome: Progressing  2/12/2024 0914 by Gary Carrion RN  Outcome: Progressing     Problem: Physical Therapy - Adult  Goal: By Discharge: Performs mobility at highest level of function for planned discharge setting.  See evaluation for individualized goals.  Description: FUNCTIONAL STATUS PRIOR TO ADMISSION: Patient was unable to provide history but chart rview indicates patient recently moed to memory care unit at [facility], was ambulatory initially but then transitioned to w/c.  Family reports history of dementia which has worsened recently.     HOME SUPPORT PRIOR TO ADMISSION: Memory care staff assists with ADLs.    Physical Therapy Goals  Initiated 2/6/2024  1.  Patient will move from supine to sit and sit to supine in bed with supervision/set-up within 7 day(s).    2.  Patient will perform sit to stand with supervision/set-up 
  Problem: Discharge Planning  Goal: Discharge to home or other facility with appropriate resources  2/13/2024 2302 by Franky Price RN  Outcome: Progressing  2/13/2024 0933 by Gary Carrion RN  Outcome: Progressing     Problem: Pain  Goal: Verbalizes/displays adequate comfort level or baseline comfort level  2/13/2024 2302 by Franky Price RN  Outcome: Progressing  2/13/2024 0933 by Gary Carrion RN  Outcome: Progressing     Problem: Skin/Tissue Integrity  Goal: Absence of new skin breakdown  Description: 1.  Monitor for areas of redness and/or skin breakdown  2.  Assess vascular access sites hourly  3.  Every 4-6 hours minimum:  Change oxygen saturation probe site  4.  Every 4-6 hours:  If on nasal continuous positive airway pressure, respiratory therapy assess nares and determine need for appliance change or resting period.  2/13/2024 2302 by Franky Price RN  Outcome: Progressing  2/13/2024 0933 by Gary Carrion RN  Outcome: Progressing     Problem: Safety - Adult  Goal: Free from fall injury  2/13/2024 2302 by Franky Price RN  Outcome: Progressing  2/13/2024 0933 by Gary Carrion RN  Outcome: Progressing     Problem: Occupational Therapy - Adult  Goal: By Discharge: Performs self-care activities at highest level of function for planned discharge setting.  See evaluation for individualized goals.  Description: FUNCTIONAL STATUS PRIOR TO ADMISSION:  Patient is an unreliable historian. Per chart, patient moved into memory care unit about one week ago. Patient diagnosed with dementia in 2022 and doing fairly well until June 2023 when she had a UTI and began to experience a significant decline in functional performance. Chart indicates patient had been walking short distances (limited by LE pain/weakness) but started using wheelchair about one week ago.     Receives Help From: Other (comment) (custodial staff), ADL Assistance: Needs assistance, Ambulation Assistance: 
  Problem: Discharge Planning  Goal: Discharge to home or other facility with appropriate resources  2/14/2024 1231 by Olivia Lindsay RN  Outcome: Completed  2/14/2024 1133 by Olivia Lindsay RN  Outcome: Progressing  2/13/2024 2302 by Franky Price RN  Outcome: Progressing     Problem: Pain  Goal: Verbalizes/displays adequate comfort level or baseline comfort level  2/14/2024 1231 by Olivia Lindsay RN  Outcome: Completed  2/14/2024 1133 by Olivia Lindsay RN  Outcome: Progressing  2/13/2024 2302 by Franky Price RN  Outcome: Progressing     Problem: Skin/Tissue Integrity  Goal: Absence of new skin breakdown  Description: 1.  Monitor for areas of redness and/or skin breakdown  2.  Assess vascular access sites hourly  3.  Every 4-6 hours minimum:  Change oxygen saturation probe site  4.  Every 4-6 hours:  If on nasal continuous positive airway pressure, respiratory therapy assess nares and determine need for appliance change or resting period.  2/14/2024 1231 by Olivia Lindsay RN  Outcome: Completed  2/14/2024 1133 by Olivia Lindsay RN  Outcome: Progressing  2/13/2024 2302 by Franky Price RN  Outcome: Progressing     Problem: Safety - Adult  Goal: Free from fall injury  2/14/2024 1231 by Olivia Lindsay RN  Outcome: Completed  2/14/2024 1133 by Olivia Lindsay RN  Outcome: Progressing  2/13/2024 2302 by Franky Price RN  Outcome: Progressing     Problem: Confusion  Goal: Confusion, delirium, dementia, or psychosis is improved or at baseline  Description: INTERVENTIONS:  1. Assess for possible contributors to thought disturbance, including medications, impaired vision or hearing, underlying metabolic abnormalities, dehydration, psychiatric diagnoses, and notify attending LIP  2. [place] high risk fall precautions, as indicated  3. Provide frequent short contacts to provide reality reorientation, refocusing and direction  4. Decrease environmental stimuli, including noise as 
  Problem: Occupational Therapy - Adult  Goal: By Discharge: Performs self-care activities at highest level of function for planned discharge setting.  See evaluation for individualized goals.  Description: FUNCTIONAL STATUS PRIOR TO ADMISSION:  Patient is an unreliable historian. Per chart, patient moved into memory care unit about one week ago. Patient diagnosed with dementia in 2022 and doing fairly well until June 2023 when she had a UTI and began to experience a significant decline in functional performance. Chart indicates patient had been walking short distances (limited by LE pain/weakness) but started using wheelchair about one week ago.     Receives Help From: Other (comment) ([facility] staff), ADL Assistance: Needs assistance, Ambulation Assistance: Needs assistance, Transfer Assistance: Needs assistance, Active : No     HOME SUPPORT: Patient lived in memory care unit.    Occupational Therapy Goals:  Initiated 2/6/2024  1.  Patient will perform brief standing grooming with Contact Guard Assist and verbal cues within 7 day(s).  2.  Patient will perform upper body dressing and bathing with Supervision and verbal cues within 7 day(s).  3.  Patient will perform lower body dressing and bathing with Minimal Assist and verbal cues within 7 day(s).  4.  Patient will perform toilet transfers with Contact Guard Assist and verbal cues within 7 day(s).  5.  Patient will perform all aspects of toileting with Minimal Assist and verbal cues within 7 day(s).  Outcome: Not Progressing   OCCUPATIONAL THERAPY TREATMENT  Patient: Joana Colon (78 y.o. female)  Date: 2/7/2024  Primary Diagnosis: Falls frequently [R29.6]  Bilateral leg weakness [R29.898]  Closed head injury, initial encounter [S09.90XA]       Precautions:                  Chart, occupational therapy assessment, plan of care, and goals were reviewed.    ASSESSMENT  Patient continues to benefit from skilled OT services and is not progressing towards goals. 
  Problem: Occupational Therapy - Adult  Goal: By Discharge: Performs self-care activities at highest level of function for planned discharge setting.  See evaluation for individualized goals.  Description: FUNCTIONAL STATUS PRIOR TO ADMISSION:  Patient is an unreliable historian. Per chart, patient moved into memory care unit about one week ago. Patient diagnosed with dementia in 2022 and doing fairly well until June 2023 when she had a UTI and began to experience a significant decline in functional performance. Chart indicates patient had been walking short distances (limited by LE pain/weakness) but started using wheelchair about one week ago.     Receives Help From: Other (comment) ([facility] staff), ADL Assistance: Needs assistance, Ambulation Assistance: Needs assistance, Transfer Assistance: Needs assistance, Active : No     HOME SUPPORT: Patient lived in memory care unit.    Occupational Therapy Goals:  Initiated 2/6/2024  1.  Patient will perform brief standing grooming with Contact Guard Assist and verbal cues within 7 day(s).  2.  Patient will perform upper body dressing and bathing with Supervision and verbal cues within 7 day(s).  3.  Patient will perform lower body dressing and bathing with Minimal Assist and verbal cues within 7 day(s).  4.  Patient will perform toilet transfers with Contact Guard Assist and verbal cues within 7 day(s).  5.  Patient will perform all aspects of toileting with Minimal Assist and verbal cues within 7 day(s).  Outcome: Progressing     OCCUPATIONAL THERAPY TREATMENT  Patient: Joana Colon (78 y.o. female)  Date: 2/8/2024  Primary Diagnosis: Falls frequently [R29.6]  Bilateral leg weakness [R29.898]  Closed head injury, initial encounter [S09.90XA]       Precautions:                  Chart, occupational therapy assessment, plan of care, and goals were reviewed.    ASSESSMENT  Patient continues to benefit from skilled OT services and is slowly progressing towards goals. 
  Problem: Occupational Therapy - Adult  Goal: By Discharge: Performs self-care activities at highest level of function for planned discharge setting.  See evaluation for individualized goals.  Description: FUNCTIONAL STATUS PRIOR TO ADMISSION:  Patient is an unreliable historian. Per chart, patient moved into memory care unit about one week ago. Patient diagnosed with dementia in 2022 and doing fairly well until June 2023 when she had a UTI and began to experience a significant decline in functional performance. Chart indicates patient had been walking short distances (limited by LE pain/weakness) but started using wheelchair about one week ago.     Receives Help From: Other (comment) ([facility] staff), ADL Assistance: Needs assistance, Ambulation Assistance: Needs assistance, Transfer Assistance: Needs assistance, Active : No     HOME SUPPORT: Patient lived in memory care unit.    Occupational Therapy Goals:  Weekly Re-assessment 2/13/2024  Continue all goals below    Initiated 2/6/2024  1.  Patient will perform brief standing grooming with Contact Guard Assist and verbal cues within 7 day(s).  2.  Patient will perform upper body dressing and bathing with Supervision and verbal cues within 7 day(s).  3.  Patient will perform lower body dressing and bathing with Minimal Assist and verbal cues within 7 day(s).  4.  Patient will perform toilet transfers with Contact Guard Assist and verbal cues within 7 day(s).  5.  Patient will perform all aspects of toileting with Minimal Assist and verbal cues within 7 day(s).  Outcome: Progressing   OCCUPATIONAL THERAPY TREATMENT: WEEKLY REASSESSMENT    Patient: Joana Colon (78 y.o. female)  Date: 2/13/2024  Primary Diagnosis: Falls frequently [R29.6]  Bilateral leg weakness [R29.898]  Closed head injury, initial encounter [S09.90XA]       Precautions:                  Chart, occupational therapy assessment, plan of care, and goals were reviewed.    ASSESSMENT  Patient 
  Problem: Physical Therapy - Adult  Goal: By Discharge: Performs mobility at highest level of function for planned discharge setting.  See evaluation for individualized goals.  Description: FUNCTIONAL STATUS PRIOR TO ADMISSION: Patient was unable to provide history but chart rview indicates patient recently moed to memory care unit at [facility], was ambulatory initially but then transitioned to w/c.  Family reports history of dementia which has worsened recently.     HOME SUPPORT PRIOR TO ADMISSION: Memory care staff assists with ADLs.    Physical Therapy Goals  Initiated 2/6/2024  1.  Patient will move from supine to sit and sit to supine in bed with supervision/set-up within 7 day(s).    2.  Patient will perform sit to stand with supervision/set-up within 7 day(s).  3.  Patient will transfer from bed to chair and chair to bed with contact guard assist using the least restrictive device within 7 day(s).  4.  Patient will ambulate with contact guard assist for 100 feet with the least restrictive device within 7 day(s).   Outcome: Not Progressing   PHYSICAL THERAPY TREATMENT    Patient: Joana Colon (78 y.o. female)  Date: 2/7/2024  Diagnosis: Falls frequently [R29.6]  Bilateral leg weakness [R29.898]  Closed head injury, initial encounter [S09.90XA] Falls frequently      Precautions:                        ASSESSMENT:  Patient continues to benefit from skilled PT services and is not progressing towards goals. Pt received supine in bed, difficult to arouse. RN present during the entirety of session. Pt required verbal and tactile cueing to arouse, intermittently would respond to stimulus. Required max A to roll to the right to perform nursing needs. Once in R sidelying, encouraged to sit up on the EOB. With max cueing and HOB elevated, trialed sitting however pt returned promptly to sidelying. Pt became increasingly agitated and inappropriate to progress mobility at this time. Pt positioned to comfort semi 
  Problem: Physical Therapy - Adult  Goal: By Discharge: Performs mobility at highest level of function for planned discharge setting.  See evaluation for individualized goals.  Description: FUNCTIONAL STATUS PRIOR TO ADMISSION: Patient was unable to provide history but chart rview indicates patient recently moed to memory care unit at [facility], was ambulatory initially but then transitioned to w/c.  Family reports history of dementia which has worsened recently.     HOME SUPPORT PRIOR TO ADMISSION: Memory care staff assists with ADLs.    Physical Therapy Goals  Initiated 2/6/2024  1.  Patient will move from supine to sit and sit to supine in bed with supervision/set-up within 7 day(s).    2.  Patient will perform sit to stand with supervision/set-up within 7 day(s).  3.  Patient will transfer from bed to chair and chair to bed with contact guard assist using the least restrictive device within 7 day(s).  4.  Patient will ambulate with contact guard assist for 100 feet with the least restrictive device within 7 day(s).   Outcome: Not Progressing   PHYSICAL THERAPY EVALUATION    Patient: Joana Colon (78 y.o. female)  Date: 2/6/2024  Primary Diagnosis: Falls frequently [R29.6]  Bilateral leg weakness [R29.898]  Closed head injury, initial encounter [S09.90XA]       Precautions:                        ASSESSMENT :   DEFICITS/IMPAIRMENTS:   The patient is limited by decreased functional mobility, strength, activity tolerance, command following, attention/concentration, balance.  Patient received up in chair with OT in room, who reports they had just returned from bathroom and patient unable to transition to sit on toilet due to confusion and had episode of incontinence, requested assistance to get patient back to bed safely.  Patient stood with min assist x 2 and was able to take a few slow shuffling steps to bed.  Returned to supine and left with call bell in reach, RN in room performing bladder scan.    Based on 
Continue POC    Problem: Physical Therapy - Adult  Goal: By Discharge: Performs mobility at highest level of function for planned discharge setting.  See evaluation for individualized goals.  Description: FUNCTIONAL STATUS PRIOR TO ADMISSION: Patient was unable to provide history but chart rview indicates patient recently moed to memory care unit at [facility], was ambulatory initially but then transitioned to w/c.  Family reports history of dementia which has worsened recently.     HOME SUPPORT PRIOR TO ADMISSION: Memory care staff assists with ADLs.    Physical Therapy Goals  Initiated 2/6/2024  1.  Patient will move from supine to sit and sit to supine in bed with supervision/set-up within 7 day(s).    2.  Patient will perform sit to stand with supervision/set-up within 7 day(s).  3.  Patient will transfer from bed to chair and chair to bed with contact guard assist using the least restrictive device within 7 day(s).  4.  Patient will ambulate with contact guard assist for 100 feet with the least restrictive device within 7 day(s).   2/6/2024 1530 by Chelsea Cline, PT  Outcome: Not Progressing     Problem: Discharge Planning  Goal: Discharge to home or other facility with appropriate resources  Outcome: Progressing     Problem: Pain  Goal: Verbalizes/displays adequate comfort level or baseline comfort level  Outcome: Progressing     Problem: Skin/Tissue Integrity  Goal: Absence of new skin breakdown  Description: 1.  Monitor for areas of redness and/or skin breakdown  2.  Assess vascular access sites hourly  3.  Every 4-6 hours minimum:  Change oxygen saturation probe site  4.  Every 4-6 hours:  If on nasal continuous positive airway pressure, respiratory therapy assess nares and determine need for appliance change or resting period.  Outcome: Progressing     Problem: Safety - Adult  Goal: Free from fall injury  Outcome: Progressing     Problem: SLP Adult - Impaired Swallowing  Goal: By Discharge: Advance to 
Problem: Confusion  Goal: Confusion, delirium, dementia, or psychosis is improved or at baseline  Description: INTERVENTIONS:  1. Assess for possible contributors to thought disturbance, including medications, impaired vision or hearing, underlying metabolic abnormalities, dehydration, psychiatric diagnoses, and notify attending LIP  2. [place] high risk fall precautions, as indicated  3. Provide frequent short contacts to provide reality reorientation, refocusing and direction  4. Decrease environmental stimuli, including noise as appropriate  5. Monitor and intervene to maintain adequate nutrition, hydration, elimination, sleep and activity  6. If unable to ensure safety without constant attention obtain sitter and review sitter guidelines with assigned personnel  7. Initiate Psychosocial CNS and Spiritual Care consult, as indicated  2/12/2024 0301 by Doug Gonzalez RN  Outcome: Progressing  Flowsheets (Taken 2/10/2024 2208)  Effect of thought disturbance (confusion, delirium, dementia, or psychosis) are managed with adequate functional status:   Assess for contributors to thought disturbance, including medications, impaired vision or hearing, underlying metabolic abnormalities, dehydration, psychiatric diagnoses, notify LIP   [place] high risk fall precautions, as indicated   Provide frequent short contacts to provide reality reorientation, refocusing and direction   Decrease environmental stimuli, including noise as appropriate   Monitor and intervene to maintain adequate nutrition, hydration, elimination, sleep and activity  2/12/2024 0301 by Doug Gonzalez RN  Outcome: Progressing     Problem: Neurosensory - Adult  Goal: Achieves stable or improved neurological status  2/12/2024 0301 by Doug Gonzalez RN  Outcome: Progressing  Flowsheets (Taken 2/12/2024 0301)  Achieves stable or improved neurological status:   Assess for and report changes in neurological status   Initiate 
Speech LAnguage Pathology TREATMENT/DISCHARGE    Patient: Joana Colon (78 y.o. female)  Date: 2/7/2024  Primary Diagnosis: Falls frequently [R29.6]  Bilateral leg weakness [R29.898]  Closed head injury, initial encounter [S09.90XA]       Precautions:                     ASSESSMENT :  Therapy session targeted oropharyngeal dysphagia on this date. Patient received alert, upright in bed with patient's grandson at bedside. Patient's grandson reports patient does well with easy to chew type consistency along with finger foods. He reports she occasionally benefits from assistance with feeding since moving into Memory Care Unit at [facility]. Prior to that, he states she was independent at meals. Patient set-up with lunch tray on this date. Patient self-fed easy to chew tray and sips of thin liquid via straw. No overt difficulty or overt signs of aspiration observed. RN present for medication administration, which patient tolerated single pills, 1 at a time, with sips of thin liquid. Significantly delayed cough observed after PO intake, suspect unrelated to swallow function. Patient felt safe to continue diet out as outlined below at this time.    Patient will be discharged from skilled speech-language pathology services at this time.     PLAN :  Recommendations and Planned Interventions:  Diet: Easy to chew and thin liquids  -- Okay for family to bring outside finger foods as long as family member/RN present for supervision  -- Medication as tolerated, tolerates pills whole (1 at a time)  -- 1:1 assistance with meal set-up and supervision with PO intake  -- Routine oral care, as able  -- Offer one item at a time and ensure bite has been swallowed prior to providing another  -- Decrease distractions (e.g. turn off the TV, provide only the utensil needed the food item)  -- Aspiration precautions: upright for all PO, small bites/sips, slow rate of intake, single sips (1 at a time), alternate liquids and solids as needed, 
Initiate Psychosocial CNS and Spiritual Care consult, as indicated  2/14/2024 1133 by Olivia Lindsay RN  Outcome: Progressing  2/13/2024 2302 by Franky Price RN  Outcome: Progressing     Problem: Neurosensory - Adult  Goal: Achieves stable or improved neurological status  2/14/2024 1133 by Olivia Lindsay RN  Outcome: Progressing  2/13/2024 2302 by Franky Price RN  Outcome: Progressing  Goal: Achieves maximal functionality and self care  2/14/2024 1133 by Olivia Lindsay RN  Outcome: Progressing  2/13/2024 2302 by Franky Price RN  Outcome: Progressing     Problem: Respiratory - Adult  Goal: Achieves optimal ventilation and oxygenation  2/14/2024 1133 by Olivia Lindsay RN  Outcome: Progressing  2/13/2024 2302 by Franky Price RN  Outcome: Progressing     Problem: Cardiovascular - Adult  Goal: Maintains optimal cardiac output and hemodynamic stability  2/14/2024 1133 by Olivia Lindsay RN  Outcome: Progressing  2/13/2024 2302 by Franky Price RN  Outcome: Progressing  Goal: Absence of cardiac dysrhythmias or at baseline  2/14/2024 1133 by Olivia Lindsay RN  Outcome: Progressing  2/13/2024 2302 by Franky Price RN  Outcome: Progressing     Problem: Skin/Tissue Integrity - Adult  Goal: Skin integrity remains intact  2/14/2024 1133 by Olivia Lindsay RN  Outcome: Progressing  2/13/2024 2302 by Franky Price RN  Outcome: Progressing  Goal: Oral mucous membranes remain intact  2/14/2024 1133 by Olivia Lindsay RN  Outcome: Progressing  2/13/2024 2302 by Franky Price RN  Outcome: Progressing     Problem: Musculoskeletal - Adult  Goal: Return mobility to safest level of function  2/14/2024 1133 by Olivia Lindsay RN  Outcome: Progressing  2/13/2024 2302 by Franky Price RN  Outcome: Progressing  Goal: Maintain proper alignment of affected body part  2/14/2024 1133 by Olivia Lindsay RN  Outcome: Progressing  2/13/2024 2302 by Franky Price, 
Patient received semisupine in bed and cleared for therapy by nursing. She completed supine > sit with max assist and scooted towards EOB with mod assist. Patient remains oriented to self only and required redirection to stay on task. Patient completed sit > stand and transferred to recliner chair using rolling walker with min assist and cues for sequencing task. Once seated, patient completed grooming tasks with set-up to min assist for sequencing tasks. Patient was left sitting in recliner chair with all needs met, chair alarmed. Recommend patient discharge to SNF rehab with transition to LTC memory care unit.              PLAN :  Patient continues to benefit from skilled intervention to address the above impairments.  Continue treatment per established plan of care to address goals.    Recommendation for discharge: (in order for the patient to meet his/her long term goals): Therapy up to 5 days/week in Skilled nursing facility with transition to LTC memory care    Other factors to consider for discharge: poor safety awareness, impaired cognition, high risk for falls, not safe to be alone, and concern for safely navigating or managing the home environment    IF patient discharges home will need the following DME: continuing to assess with progress       SUBJECTIVE:   Patient stated “Walker, he's a mean vinita.”    OBJECTIVE DATA SUMMARY:   Cognitive/Behavioral Status:  Orientation  Overall Orientation Status: Impaired  Orientation Level: Oriented to person;Disoriented to time;Disoriented to situation;Disoriented to place  Cognition  Overall Cognitive Status: Exceptions  Arousal/Alertness: Delayed responses to stimuli  Following Commands: Follows one step commands with repetition;Follows one step commands with increased time  Attention Span: Difficulty attending to directions  Memory: Decreased short term memory;Decreased recall of biographical Information;Decreased recall of recent events  Safety Judgement: Decreased 
benefit from skilled intervention to address the above impairments.  Continue treatment per established plan of care.    Recommendation for discharge: (in order for the patient to meet his/her long term goals): SNF v memory care/ LTC    Other factors to consider for discharge: poor safety awareness, impaired cognition, high risk for falls, not safe to be alone, and concern for safely navigating or managing the home environment    IF patient discharges home will need the following DME: continuing to assess with progress       SUBJECTIVE:   Patient confused    OBJECTIVE DATA SUMMARY:       Functional Mobility Training:  Bed Mobility:  Bed Mobility Training  Bed Mobility Training: Yes  Supine to Sit: Maximum assistance;Additional time (repeated multimodal cues)  Scooting: Moderate assistance;Additional time (scoot to EOB)  Transfers:  Transfer Training  Transfer Training: Yes  Sit to Stand: Moderate assistance;Assist X2  Stand to Sit: Minimum assistance;Moderate assistance;Assist X2  Bed to Chair: Minimum assistance;Moderate assistance;Assist X2;Additional time (use of RW, cues for sequence, assist for balance/ walker mgmt)  Balance:  Balance  Sitting: Impaired  Sitting - Static: Fair (occasional)  Sitting - Dynamic: Poor (constant support)  Standing: Impaired  Standing - Static: Constant support;Fair  Standing - Dynamic: Constant support;Fair;Poor   Ambulation/Gait Training:     Gait  Overall Level of Assistance: Minimum assistance;Moderate assistance;Assist X2  Distance (ft): 4 Feet  Assistive Device: Gait belt;Walker, rolling  Interventions: Verbal cues;Tactile cues;Safety awareness training;Manual cues  Speed/Bhakti: Slow;Shuffled  Step Length: Left shortened;Right shortened  Gait Abnormalities: Decreased step clearance;Shuffling gait      Pain Rating:  No c/o pain    Activity Tolerance:   Fair     After treatment:   Patient left in no apparent distress sitting up in chair, Call bell within reach, Bed/ chair alarm 
goals. Patient's rehabilitation potential is considered to be Good.    Discharge Recommendations: Continue to assess pending progress     SUBJECTIVE:   Patient stated, “that's good.”    OBJECTIVE:     Past Medical History:   Diagnosis Date    Back pain     CAD (coronary artery disease)     CABG, [place]    Dry eye     Hypercholesterolemia     Hypertension      Past Surgical History:   Procedure Laterality Date    CORONARY ARTERY BYPASS GRAFT  2002    GYN      HYSTERECTOMY, VAGINAL  2014     Prior Level of Function/Home Situation:        Baseline Assessment:  Current Diet : Pureed  Current Liquid Diet : Thin          Cognitive and Communication Status:  Neurologic State: Alert  Orientation Level: Oriented to person, Oriented to place (oriented to \"[facility]\", but stated she's in [place]), Disoriented to situation, and Disoriented to time  Cognition: Follows commands (simple, oral motor commands)    Dysphagia:  Oral Assessment:  Oral Motor   Labial: No impairment  Dentition: Natural  Oral Hygiene: Moist  Oral Hygiene Comments:  (Torus palatinus observed)  Lingual: No impairment  Velum: Unable to visualize  Mandible: No impairment  P.O. Trials:  PO Trials  Assessment Method(s): Observation  Patient Position: Upright in bed  Vocal Quality: No Impairment  Consistency Presented: Thin;Pureed;Regular  How Presented: Self-fed/presented;Straw;Successive Swallows;Spoon  Bolus Acceptance: No impairment  Bolus Formation/Control: Impaired  Type of Impairment: Delayed (Mildly delayed mastication of solids)  Propulsion: Delayed (# of seconds)  Oral Residue: None  Initiation of Swallow:  (Present)  Aspiration Signs/Symptoms: None  Pharyngeal Phase Characteristics: No impairment, issues, or problems            Respiratory Status/Airway:  Room air                           Functional Oral Intake Scale (FOIS): 6--Total oral diet with multiple consistencies without special preparation, but with specific food 
time to complete task due to cognition impairments and inability to attend to task for extended times. During gait to chair, pt requires max verbal and manual cues for sequencing as well as increased time. Overall pt with significant impairments and mobility and endurance. Pt would continue to benefit from skilled PT services. Pt positioned to comfort in bedside chair with all needs within reach and OT in room to continue session.          PLAN:  Patient continues to benefit from skilled intervention to address the above impairments.  Continue treatment per established plan of care.    Recommendation for discharge: (in order for the patient to meet his/her long term goals): Therapy up to 5 days/week in Skilled nursing facility    Other factors to consider for discharge: poor safety awareness, impaired cognition, high risk for falls, not safe to be alone, and concern for safely navigating or managing the home environment    IF patient discharges home will need the following DME: continuing to assess with progress       SUBJECTIVE:   Patient stated, \"I cannot do that.\"    OBJECTIVE DATA SUMMARY:   Critical Behavior:  Orientation  Overall Orientation Status: Impaired  Orientation Level: Oriented to person;Disoriented to time;Disoriented to situation;Disoriented to place  Cognition  Overall Cognitive Status: Exceptions  Arousal/Alertness: Delayed responses to stimuli;Inconsistent responses to stimuli  Following Commands: Follows one step commands with increased time;Follows multistep commands with repitition    Functional Mobility Training:  Bed Mobility:  Bed Mobility Training  Bed Mobility Training: Yes  Rolling: Maximum assistance  Supine to Sit: Maximum assistance;Total assistance  Scooting: Total assistance  Transfers:  Transfer Training  Transfer Training: Yes  Sit to Stand: Moderate assistance;Maximum assistance;Assist X2;Additional time  Stand to Sit: Minimum assistance;Moderate assistance;Assist X2  Bed to 
day(s).    2.  Patient will perform sit to stand with supervision/set-up within 7 day(s).  3.  Patient will transfer from bed to chair and chair to bed with contact guard assist using the least restrictive device within 7 day(s).  4.  Patient will ambulate with contact guard assist for 100 feet with the least restrictive device within 7 day(s).   2/7/2024 1245 by Glory Elias PTA  Outcome: Not Progressing     Problem: Occupational Therapy - Adult  Goal: By Discharge: Performs self-care activities at highest level of function for planned discharge setting.  See evaluation for individualized goals.  Description: FUNCTIONAL STATUS PRIOR TO ADMISSION:  Patient is an unreliable historian. Per chart, patient moved into memory care unit about one week ago. Patient diagnosed with dementia in 2022 and doing fairly well until June 2023 when she had a UTI and began to experience a significant decline in functional performance. Chart indicates patient had been walking short distances (limited by LE pain/weakness) but started using wheelchair about one week ago.     Receives Help From: Other (comment) ([facility] staff), ADL Assistance: Needs assistance, Ambulation Assistance: Needs assistance, Transfer Assistance: Needs assistance, Active : No     HOME SUPPORT: Patient lived in memory care unit.    Occupational Therapy Goals:  Initiated 2/6/2024  1.  Patient will perform brief standing grooming with Contact Guard Assist and verbal cues within 7 day(s).  2.  Patient will perform upper body dressing and bathing with Supervision and verbal cues within 7 day(s).  3.  Patient will perform lower body dressing and bathing with Minimal Assist and verbal cues within 7 day(s).  4.  Patient will perform toilet transfers with Contact Guard Assist and verbal cues within 7 day(s).  5.  Patient will perform all aspects of toileting with Minimal Assist and verbal cues within 7 day(s).  2/7/2024 1036 by Lisa Callahan OT  Outcome: Not 
ambulates to bathroom with RW and overall min assist, requiring additional cues for sequencing/progression and intermittent assist for RW management. Once in bathroom, patient unable to follow commands to sit on toilet despite max multimodal cues and several different approaches. Patient performs 75% stand>sit before returning to standing, performing this motion at least 20x. Patient ultimately incontinent of urine in standing and exhibiting even greater confusion and distractibility following.     Lower-Body Bathing: Patient requires max assist to clean lower-body following bladder incontinence. Patient requires activity be performed in seated due to impaired balance and sequencing.     Lower-Body Dressing: Patient requires total assist to doff soiled brief and don clean brief following incontinence episode.     Self-Feeding: Patient self-feeds with material set-up, effectively utilizing utensils available.  Patient sits up in chair ~10 minutes before returning to bed with assist.     [facility] AM-PACTM \"6 Clicks\"                                                       Daily Activity Inpatient Short Form  How much help from another person does the patient currently need... Total; A Lot A Little None   1.  Putting on and taking off regular lower body clothing? [x]  1 []  2 []  3 []  4   2.  Bathing (including washing, rinsing, drying)? []  1 [x]  2 []  3 []  4   3.  Toileting, which includes using toilet, bedpan or urinal? [] 1 [x]  2 []  3 []  4   4.  Putting on and taking off regular upper body clothing? []  1 []  2 [x]  3 []  4   5.  Taking care of personal grooming such as brushing teeth? []  1 []  2 [x]  3 []  4   6.  Eating meals? []  1 []  2 [x]  3 []  4   © 2007, Trustees of [facility], under license to [employer]. All rights reserved     Score: 14/24     Interpretation of Tool:  Represents clinically-significant functional categories (i.e. Activities of daily living).    Cutoff score 39.4

## 2024-02-14 NOTE — CARE COORDINATION
S[facility] - [facility]. Bed # M11B, call report [phone]. [facility] arranged for 1300. Clear from CM.     Transition of Care Plan:     RUR: 13% (low RUR)  Prior Level of Functioning: Needing some assistance         Disposition: SNF   If SNF or IPR: Date FOC offered: 02/07/24  Date FOC received: 02/07/24  Accepting facility: [facility]  Date authorization started with reference number:   Date authorization received and expires:   Follow up appointments: defer to SNF  DME needed: defer to SNF   Transportation at discharge: [facility] @ 1300  IM/IMM Medicare/ letter given: 2nd given  Is patient a  and connected with [facility]? N/a              If yes, was [place] transfer form completed and [facility] notified? N/a  Caregiver Contact: Ellen Wahl; child; [phone]   Discharge Caregiver contacted prior to discharge? Contacted  Care Conference needed? Not at this time  Barriers to discharge: None    0810 - Pt clear for d/c from medical standpoint. CM inquiring if [facility] has a bed available today and what time they can accept.     0943 - Bed is available, pending info. [facility] working to set up transport for 1300.     1041 - Transport confirmed for 1300 with [facility]. Going to room M11B [phone]. Family updated. Clear from CM.     1409 - POST D/C NOTE: Received call from facility requesting DC summary before 3pm. CM messaged provider to request. CM will upload when available.    1607 - D/C summary received by facility. CM closing chart.     Transition of Care Plan to SNF/Rehab    Communication to Patient/Family:  Met with patient and family and they are agreeable to the transition plan. The Plan for Transition of Care is related to the following treatment goals: SNF     The Patient and/or patient representative was provided with a choice of provider and agrees  with the discharge plan.      Yes [x] No []    A Freedom of choice list was provided with basic dialogue that supports the patient's individualized plan of

## 2024-02-15 VITALS
DIASTOLIC BLOOD PRESSURE: 75 MMHG | BODY MASS INDEX: 23.32 KG/M2 | WEIGHT: 140 LBS | SYSTOLIC BLOOD PRESSURE: 117 MMHG | TEMPERATURE: 98.6 F | HEIGHT: 65 IN | HEART RATE: 80 BPM | OXYGEN SATURATION: 91 % | RESPIRATION RATE: 16 BRPM